# Patient Record
Sex: MALE | Race: WHITE | NOT HISPANIC OR LATINO | Employment: FULL TIME | URBAN - METROPOLITAN AREA
[De-identification: names, ages, dates, MRNs, and addresses within clinical notes are randomized per-mention and may not be internally consistent; named-entity substitution may affect disease eponyms.]

---

## 2017-03-11 ENCOUNTER — LAB VISIT (OUTPATIENT)
Dept: LAB | Facility: HOSPITAL | Age: 62
End: 2017-03-11
Attending: FAMILY MEDICINE
Payer: COMMERCIAL

## 2017-03-11 DIAGNOSIS — E66.9 OBESITY (BMI 30-39.9): ICD-10-CM

## 2017-03-11 DIAGNOSIS — E11.8 TYPE 2 DIABETES MELLITUS WITH COMPLICATION, WITHOUT LONG-TERM CURRENT USE OF INSULIN: ICD-10-CM

## 2017-03-11 LAB — 25(OH)D3+25(OH)D2 SERPL-MCNC: 29 NG/ML

## 2017-03-11 PROCEDURE — 83036 HEMOGLOBIN GLYCOSYLATED A1C: CPT

## 2017-03-11 PROCEDURE — 82306 VITAMIN D 25 HYDROXY: CPT

## 2017-03-11 PROCEDURE — 36415 COLL VENOUS BLD VENIPUNCTURE: CPT | Mod: PO

## 2017-03-13 LAB
ESTIMATED AVG GLUCOSE: 126 MG/DL
HBA1C MFR BLD HPLC: 6 %

## 2017-03-15 ENCOUNTER — DOCUMENTATION ONLY (OUTPATIENT)
Dept: FAMILY MEDICINE | Facility: CLINIC | Age: 62
End: 2017-03-15

## 2017-03-15 NOTE — PROGRESS NOTES
Pre-Visit Chart Review  For Appointment Scheduled on 3/15/17    Health Maintenance Due   Topic Date Due    Urine Microalbumin  06/06/1965    TETANUS VACCINE  06/06/1973    Pneumococcal PPSV23 (Medium Risk) (1) 06/06/1973    Zoster Vaccine  06/06/2015    Eye Exam  06/18/2016    Influenza Vaccine  08/01/2016

## 2017-03-16 ENCOUNTER — TELEPHONE (OUTPATIENT)
Dept: FAMILY MEDICINE | Facility: CLINIC | Age: 62
End: 2017-03-16

## 2017-03-16 NOTE — TELEPHONE ENCOUNTER
----- Message from Lenny Fletcher MD sent at 3/14/2017  9:24 AM CDT -----  Please call informed patient that recent lab work shows a decrease in patient's hemoglobin A1c from 6.6-6.0.  Patient also has vitamin D deficiency.  Please advise patient to take 1000 - 2000 units of vitamin D daily

## 2017-04-25 ENCOUNTER — DOCUMENTATION ONLY (OUTPATIENT)
Dept: FAMILY MEDICINE | Facility: CLINIC | Age: 62
End: 2017-04-25

## 2017-04-25 NOTE — PROGRESS NOTES
Pre-Visit Chart Review  For Appointment Scheduled on 4/25/17.    Health Maintenance Due   Topic Date Due    Urine Microalbumin  06/06/1965    TETANUS VACCINE  06/06/1973    Pneumococcal PPSV23 (Medium Risk) (1) 06/06/1973    Zoster Vaccine  06/06/2015    Eye Exam  06/18/2016    Influenza Vaccine  08/01/2016

## 2017-04-26 ENCOUNTER — TELEPHONE (OUTPATIENT)
Dept: DERMATOLOGY | Facility: CLINIC | Age: 62
End: 2017-04-26

## 2017-04-26 ENCOUNTER — OFFICE VISIT (OUTPATIENT)
Dept: FAMILY MEDICINE | Facility: CLINIC | Age: 62
End: 2017-04-26
Payer: COMMERCIAL

## 2017-04-26 VITALS
DIASTOLIC BLOOD PRESSURE: 66 MMHG | WEIGHT: 242.5 LBS | RESPIRATION RATE: 18 BRPM | HEART RATE: 61 BPM | BODY MASS INDEX: 36.75 KG/M2 | TEMPERATURE: 98 F | SYSTOLIC BLOOD PRESSURE: 108 MMHG | HEIGHT: 68 IN

## 2017-04-26 DIAGNOSIS — R21 RASH: Primary | ICD-10-CM

## 2017-04-26 DIAGNOSIS — E11.8 TYPE 2 DIABETES MELLITUS WITH COMPLICATION, WITHOUT LONG-TERM CURRENT USE OF INSULIN: ICD-10-CM

## 2017-04-26 DIAGNOSIS — E66.9 OBESITY (BMI 30-39.9): ICD-10-CM

## 2017-04-26 PROCEDURE — 99214 OFFICE O/P EST MOD 30 MIN: CPT | Mod: S$GLB,,, | Performed by: FAMILY MEDICINE

## 2017-04-26 PROCEDURE — 99999 PR PBB SHADOW E&M-EST. PATIENT-LVL IV: CPT | Mod: PBBFAC,,, | Performed by: FAMILY MEDICINE

## 2017-04-26 PROCEDURE — 3060F POS MICROALBUMINURIA REV: CPT | Mod: 8P,S$GLB,, | Performed by: FAMILY MEDICINE

## 2017-04-26 PROCEDURE — 1160F RVW MEDS BY RX/DR IN RCRD: CPT | Mod: S$GLB,,, | Performed by: FAMILY MEDICINE

## 2017-04-26 PROCEDURE — 3044F HG A1C LEVEL LT 7.0%: CPT | Mod: S$GLB,,, | Performed by: FAMILY MEDICINE

## 2017-04-26 RX ORDER — HYDROCORTISONE 25 MG/G
CREAM TOPICAL 2 TIMES DAILY
Qty: 20 G | Refills: 2 | Status: SHIPPED | OUTPATIENT
Start: 2017-04-26 | End: 2019-02-26

## 2017-04-26 RX ORDER — ZOLMITRIPTAN 5 MG/1
SPRAY, METERED NASAL
Refills: 2 | COMMUNITY
Start: 2017-02-20 | End: 2017-05-09 | Stop reason: SDUPTHER

## 2017-04-26 NOTE — TELEPHONE ENCOUNTER
----- Message from Estella Schroeder sent at 4/26/2017  2:27 PM CDT -----  Pt was seen today and has an appt for 6/15 but has a rash and would like to be seen sooner if possible  Please call him @ [phone]  Thanks !

## 2017-04-26 NOTE — MR AVS SNAPSHOT
[facility]  [address]  [place] [address]  Phone: [phone]  Fax: [phone]                  Clive Guzman   2017 2:20 PM   Office Visit    Description:  Male : 1955   Provider:  Lenny Fletcher MD   Department:  [facility]           Reason for Visit     Itching           Diagnoses this Visit        Comments    Rash    -  Primary     Type 2 diabetes mellitus with complication, without long-term current use of insulin                To Do List           Future Appointments        Provider Department Dept Phone    6/15/2017 2:15 PM Brittani Ruiz MD [place] [facility] [phone]    2017 11:20 AM Lenny Fletcher MD [facility] [phone]      Goals (5 Years of Data)     None      Follow-Up and Disposition     Return in about 3 months (around 2017).       These Medications        Disp Refills Start End    hydrocortisone 2.5 % cream 20 g 2 2017    Apply topically 2 (two) times daily. - Topical (Top)    Pharmacy: [id number]  [place] - [address] [facility] [address] [facility] Ph #: [phone]       blood sugar diagnostic Strp 200 strip 11 2017     1 strip by Misc.(Non-Drug; Combo Route) route once daily. - Misc.(Non-Drug; Combo Route)    Pharmacy: [id number]  [place] - [address] [facility] [address] [facility] Ph #: [phone]         [facility] On [facility]     [facility] On Call Nurse Care Line -  Assistance  Unless otherwise directed by your provider, please contact [facility], our nurse care line that is available for  assistance.     Registered nurses in the [facility] provide: appointment scheduling, clinical advisement, health education, and other advisory services.  Call: [phone] (toll free)               Medications           Message regarding Medications     Verify the changes and/or additions  to your medication regime listed below are the same as discussed with your clinician today.  If any of these changes or additions are incorrect, please notify your healthcare provider.        START taking these NEW medications        Refills    hydrocortisone 2.5 % cream 2    Sig: Apply topically 2 (two) times daily.    Class: Normal    Route: Topical (Top)    blood sugar diagnostic Strp 11    Si strip by Misc.(Non-Drug; Combo Route) route once daily.    Class: Normal    Route: Misc.(Non-Drug; Combo Route)           Verify that the below list of medications is an accurate representation of the medications you are currently taking.  If none reported, the list may be blank. If incorrect, please contact your healthcare provider. Carry this list with you in case of emergency.           Current Medications     aspirin (ASPIR-81) 81 MG EC tablet Take 1 tablet by mouth Daily. Every day    cholecalciferol, vitamin D3, 1,000 unit capsule Take 1 capsule by mouth Daily. Every day    DYMISTA 137-50 mcg/spray Spry     fenofibrate 160 MG Tab Take 1 tablet (160 mg total) by mouth once daily.    lancets 33 gauge Misc 1 lancet by Misc.(Non-Drug; Combo Route) route once daily.    magnesium oxide-Mg AA chelate 133 mg Tab Take 1 tablet by mouth.    metformin (GLUCOPHAGE) 500 MG tablet Take 1 tablet (500 mg total) by mouth 2 (two) times daily with meals.    nadolol (CORGARD) 20 MG tablet Take 1 tablet (20 mg total) by mouth once daily.    omeprazole (PRILOSEC) 40 MG capsule Take 1 capsule (40 mg total) by mouth once daily.    zolmitriptan (ZOMIG) 5 MG tablet Take 1 tablet (5 mg total) by mouth 2 (two) times daily as needed for Migraine.    ZOMIG 5 mg Spry USE 1 SPRAY IN NOS ONCE PRN    blood sugar diagnostic Strp One touch test strips    DAILY testing    blood sugar diagnostic Strp 1 strip by Misc.(Non-Drug; Combo Route) route once daily.    hydrocortisone 2.5 % cream Apply topically 2 (two) times daily.           Clinical Reference  "Information           Your Vitals Were     BP Pulse Temp Resp Height Weight    108/66 (BP Location: Right arm, Patient Position: Sitting, BP Method: Automatic) 61 98.3 °F (36.8 °C) (Oral) 18 5' 8" (1.727 m) 110 kg (242 lb 8.1 oz)    BMI                36.87 kg/m2          Blood Pressure          Most Recent Value    BP  108/66      Allergies as of 4/26/2017     Lipitor  [Atorvastatin]      Immunizations Administered on Date of Encounter - 4/26/2017     None      Orders Placed During Today's Visit      Normal Orders This Visit    Ambulatory referral to Dermatology     Ambulatory referral to Ophthalmology       Language Assistance Services     ATTENTION: Language assistance services are available, free of charge. Please call [phone].      ATENCIÓN: Si boonela nilda, tiene a bautista disposición servicios gratuitos de asistencia lingüística. Llame al [phone].     CHÚ Ý: N?u b?n nói Ti?ng Vi?t, có các d?ch v? h? tr? ngôn ng? mi?n phí dành cho b?n. G?i s? [phone].         [facility] complies with applicable Federal civil rights laws and does not discriminate on the basis of race, color, national origin, age, disability, or sex.        "

## 2017-04-27 NOTE — PROGRESS NOTES
"[facility]  Progress Note    Subjective:       Patient ID: Clive Guzman is a 61 y.o. male.    Chief Complaint: Itching (feet and arms)    HPI61 y.o.male is here today complaining of rash.  Patient has been getting a red itchy rash periodically throughout his body.  Patient has been taking Benadryl which has been moderately effective in controlling his symptoms.  Patient does not have a history of recurring rashes in the past.  Patient has not been evaluated by dermatology.  Patient denies any associated fever, chills, or new irritants.  No further complaints at today's visit.  Review of Systems   Constitutional: Negative for chills, fatigue and fever.   HENT: Negative for congestion, ear pain, postnasal drip, sinus pressure, sneezing and sore throat.    Eyes: Negative for pain.   Respiratory: Negative for cough, shortness of breath and wheezing.    Cardiovascular: Negative for chest pain, palpitations and leg swelling.   Gastrointestinal: Negative for abdominal distention, abdominal pain, constipation, diarrhea, nausea and vomiting.   Genitourinary: Negative for difficulty urinating.   Musculoskeletal: Negative for back pain and myalgias.   Skin: Positive for rash.   Neurological: Negative for dizziness, seizures, syncope, weakness and numbness.   Psychiatric/Behavioral: Negative for sleep disturbance. The patient is not nervous/anxious.        Objective:      Vitals:    04/26/17 1409   BP: 108/66   BP Location: Right arm   Patient Position: Sitting   BP Method: Automatic   Pulse: 61   Resp: 18   Temp: 98.3 °F (36.8 °C)   TempSrc: Oral   Weight: 110 kg (242 lb 8.1 oz)   Height: 5' 8" (1.727 m)     Body mass index is 36.87 kg/(m^2).  Physical Exam   Constitutional: He is oriented to person, place, and time. He appears well-developed and well-nourished.   HENT:   Head: Normocephalic and atraumatic.   Eyes: Conjunctivae and EOM are normal. Pupils are equal, round, and reactive to light.   Neck: Normal " range of motion. Neck supple. No JVD present.   Cardiovascular: Normal rate, regular rhythm, normal heart sounds and intact distal pulses.  Exam reveals no gallop and no friction rub.    No murmur heard.  Pulmonary/Chest: Effort normal and breath sounds normal. No respiratory distress. He has no wheezes.   Abdominal: Soft. Bowel sounds are normal. There is no tenderness.   Musculoskeletal: Normal range of motion.   Neurological: He is alert and oriented to person, place, and time. No cranial nerve deficit.   Skin: Skin is warm and dry.   Circular areas of erythema with center on left arm   Psychiatric: He has a normal mood and affect. His behavior is normal. Judgment and thought content normal.   Nursing note and vitals reviewed.      Assessment:       1. Rash    2. Type 2 diabetes mellitus with complication, without long-term current use of insulin    3. Obesity (BMI 30-39.9)        Plan:       Rash  -     Ambulatory referral to Dermatology  -     hydrocortisone 2.5 % cream; Apply topically 2 (two) times daily.  Dispense: 20 g; Refill: 2    Type 2 diabetes mellitus with complication, without long-term current use of insulin  -     blood sugar diagnostic Strp; 1 strip by Misc.(Non-Drug; Combo Route) route once daily.  Dispense: 200 strip; Refill: 11  -     Ambulatory referral to Ophthalmology    Obesity (BMI 30-39.9)        - Patient educated on diet and exercise     Patient readiness: acceptance and barriers:none    During the course of the visit the patient was educated and counseled about the following:     Diabetes:  Discussed general issues about diabetes pathophysiology and management.  Suggested low cholesterol diet.  Encouraged aerobic exercise.  Obesity:   General weight loss/lifestyle modification strategies discussed (elicit support from others; identify saboteurs; non-food rewards, etc).    Goals: Diabetes: Maintain Hemoglobin A1C below 7 and Obesity: Reduce calorie intake and BMI    Did patient meet  goals/outcomes: Yes    The following self management tools provided: blood glucose log  excercise log    Patient Instructions (the written plan) was given to the patient/family.     Time spent with patient: 45 minutes        Return in about 3 months (around 7/26/2017).  Lenny Fletcher MD  [facility]  4/27/2017 7:47 AM

## 2017-05-09 ENCOUNTER — INITIAL CONSULT (OUTPATIENT)
Dept: DERMATOLOGY | Facility: CLINIC | Age: 62
End: 2017-05-09
Payer: COMMERCIAL

## 2017-05-09 VITALS — WEIGHT: 242 LBS | BODY MASS INDEX: 36.68 KG/M2 | HEIGHT: 68 IN

## 2017-05-09 DIAGNOSIS — L98.9 DISEASE OF SKIN AND SUBCUTANEOUS TISSUE: Primary | ICD-10-CM

## 2017-05-09 PROCEDURE — 87220 TISSUE EXAM FOR FUNGI: CPT | Mod: S$GLB,,, | Performed by: DERMATOLOGY

## 2017-05-09 PROCEDURE — 1160F RVW MEDS BY RX/DR IN RCRD: CPT | Mod: S$GLB,,, | Performed by: DERMATOLOGY

## 2017-05-09 PROCEDURE — 99999 PR PBB SHADOW E&M-EST. PATIENT-LVL II: CPT | Mod: PBBFAC,,, | Performed by: DERMATOLOGY

## 2017-05-09 PROCEDURE — 99202 OFFICE O/P NEW SF 15 MIN: CPT | Mod: S$GLB,,, | Performed by: DERMATOLOGY

## 2017-05-09 RX ORDER — FLUOCINONIDE 0.5 MG/G
OINTMENT TOPICAL
Qty: 60 G | Refills: 1 | Status: SHIPPED | OUTPATIENT
Start: 2017-05-09 | End: 2019-01-25

## 2017-05-09 NOTE — PATIENT INSTRUCTIONS
XEROSIS (DRY SKIN)    Xerosis is the term for dry skin.  We all have a natural oil coating over our skin produced by the skin oil glands.  If this oil is removed, the skin becomes dry which can lead to cracking, which can lead to inflammation. Xerosis is usually a long-term problem that recurs often, especially in the winter.    1. Cause     Long hot baths or showers can remove our natural oil and lead to xerosis.  One should never take more than one bath or shower a day and for no longer than ten minutes.   Use of harsh soaps such as Zest, Dial, and Ivory can worsen and cause xerosis.   Cold winter weather worsens xerosis because the amount of moisture contained in cold air is much less than the amount of moisture in warm air.    2. Treatment     Treatment is intended to restore the natural oil to your skin.  Keep the skin lubricated.     Do not take more than one bath or shower a day.  Use lukewarm water, not hot.  Hot water dries out the skin.     Use a gentle moisturizing soap such as Cetaphil soap, cerave gentle cleanser, Oil of Olay, Dove sensitive bar, Basis, Ivory moisture care, Restoraderm cleanser.     When toweling dry, dont rub.  Blot the skin so there is still some water left on the skin.  Immediately after toweling, you should apply a moisturizing cream from neck to toes such as Cerave cream, Cetaphil cream, Restoraderm or Eucerin Original Formula cream.  Alpha hydroxyacid lotions, i.e., AmLactin or Cerave SA, also work very well for preventing dry skin, but may burn when used on inflamed or reddened skin.     If you like to swim during the winter months, you should not use soap when getting out of the pool.  When you have finished swimming, rinse off the chlorine with cool to warm water.  If this will be the only shower of the day, then you may use Cetaphil or another mild soap to cleanse your skin.  After the shower, apply a moisturizing cream to all of the skin as above.    3. Additional  recommendation:      Use unscented hypoallergenic detergent for your clothes, avoid softener or dryer sheets unless they are unscented and hypoallergenic (all free and clear; downy free and gentle).    [facility]; [address] [place] [address] Tel: [phone] Fax: [phone]

## 2017-05-09 NOTE — LETTER
May 9, 2017      Lenny Fletcher MD  [address]  [place] [address]           [place] [facility]  [address]  [place] [address]  Phone: [phone]          Patient: Clive Guzman   MR Number: [id number]   YOB: 1955   Date of Visit: 5/9/2017       Dear Dr. Lenny Fletcher:    Thank you for referring Clive Guzman to me for evaluation. Attached you will find relevant portions of my assessment and plan of care.    If you have questions, please do not hesitate to call me. I look forward to following Clive Guzman along with you.    Sincerely,    Brittani Ruiz MD    Enclosure  CC:  No Recipients    If you would like to receive this communication electronically, please contact [email or web address] or [phone] to request more information on [facility] [employer] access.    For providers and/or their staff who would like to refer a patient to [facility], please contact us through our one-stop-shop provider referral line, [facility], at [phone].    If you feel you have received this communication in error or would no longer like to receive these types of communications, please e-mail [email or web address]

## 2017-05-09 NOTE — PROGRESS NOTES
"  Subjective:       Patient ID:  Clive Guzman is a 61 y.o. male who presents for   Chief Complaint   Patient presents with    Rash     hands, feet, lower legs, lower arms     HPI Comments: Initial visit  Reports pimple like lesions,evolve into swollen itchy nodules, entire area becomes itchy, discharges clear fluid  New for past 3 months  No similar issue in the past  Come in crops of 2-3 , mostly hands and feet  No other family member with similar issue  Works in [employer], denies contact with chemicals  However at the time of onset "talstart" insecticide was applied to lawn          Current Outpatient Prescriptions:     aspirin (ASPIR-81) 81 MG EC tablet, Take 1 tablet by mouth Daily. Every day, Disp: , Rfl:     blood sugar diagnostic Strp, One touch test strips    DAILY testing, Disp: 30 strip, Rfl: 11    blood sugar diagnostic Strp, 1 strip by Misc.(Non-Drug; Combo Route) route once daily., Disp: 200 strip, Rfl: 11    cholecalciferol, vitamin D3, 1,000 unit capsule, Take 1 capsule by mouth Daily. Every day, Disp: , Rfl:     DYMISTA 137-50 mcg/spray Spry, , Disp: , Rfl: 5    fenofibrate 160 MG Tab, Take 1 tablet (160 mg total) by mouth once daily., Disp: 90 tablet, Rfl: 3    lancets 33 gauge Misc, 1 lancet by Misc.(Non-Drug; Combo Route) route once daily., Disp: 100 each, Rfl: 4    magnesium oxide-Mg AA chelate 133 mg Tab, Take 1 tablet by mouth., Disp: , Rfl:     metformin (GLUCOPHAGE) 500 MG tablet, Take 1 tablet (500 mg total) by mouth 2 (two) times daily with meals., Disp: 180 tablet, Rfl: 3    nadolol (CORGARD) 20 MG tablet, Take 1 tablet (20 mg total) by mouth once daily., Disp: 60 tablet, Rfl: 5    omeprazole (PRILOSEC) 40 MG capsule, Take 1 capsule (40 mg total) by mouth once daily., Disp: 90 capsule, Rfl: 3    zolmitriptan (ZOMIG) 5 MG tablet, Take 1 tablet (5 mg total) by mouth 2 (two) times daily as needed for Migraine., Disp: 9 tablet, Rfl: 5    " hydrocortisone 2.5 % cream, Apply topically 2 (two) times daily., Disp: 20 g, Rfl: 2      Rash  - Initial  Affected locations: left lower leg, right lower leg, right arm, left arm, left hand and right hand  Duration: 3 months  Signs / symptoms: itching (raised, come and goes, somw have fluid like discomfort )  Severity: mild  Timing: constant  Aggravated by: scratching  Treatments tried: cortisone cream OTC.  Improvement on treatment: no relief        Review of Systems   Constitutional: Negative for fever, chills, weight loss, weight gain, fatigue, night sweats and malaise.   Skin: Positive for itching, rash and activity-related sunscreen use.   Hematologic/Lymphatic: Bruises/bleeds easily.        Objective:    Physical Exam   Skin:   Areas Examined (abnormalities noted in diagram):   RUE Inspected  LUE Inspection Performed  RLE Inspected  LLE Inspection Performed  Nails and Digits Inspection Performed              Diagram Legend     Erythematous scaling macule/papule c/w actinic keratosis       Vascular papule c/w angioma      Pigmented verrucoid papule/plaque c/w seborrheic keratosis      Yellow umbilicated papule c/w sebaceous hyperplasia      Irregularly shaped tan macule c/w lentigo     1-2 mm smooth white papules consistent with Milia      Movable subcutaneous cyst with punctum c/w epidermal inclusion cyst      Subcutaneous movable cyst c/w pilar cyst      Firm pink to brown papule c/w dermatofibroma      Pedunculated fleshy papule(s) c/w skin tag(s)      Evenly pigmented macule c/w junctional nevus     Mildly variegated pigmented, slightly irregular-bordered macule c/w mildly atypical nevus      Flesh colored to evenly pigmented papule c/w intradermal nevus       Pink pearly papule/plaque c/w basal cell carcinoma      Erythematous hyperkeratotic cursted plaque c/w SCC      Surgical scar with no sign of skin cancer recurrence      Open and closed comedones      Inflammatory papules and pustules      Verrucoid  papule consistent consistent with wart     Erythematous eczematous patches and plaques     Dystrophic onycholytic nail with subungual debris c/w onychomycosis     Umbilicated papule    Erythematous-base heme-crusted tan verrucoid plaque consistent with inflamed seborrheic keratosis     Erythematous Silvery Scaling Plaque c/w Psoriasis     See annotation      Assessment / Plan:        Disease of skin and subcutaneous tissue  ABR vs other  Oil prep negative for scabies  Mostly secondary lesions today, excoriated, biopsy would unlikely be helpful  Noticed new crops after yard work  Stop yardwork  Return to clinic if new lesions appear, will biopsy primary lesion  -     fluocinonide (LIDEX) 0.05 % ointment; Spot treat itchy bumps ankles and hands BID PRN lfare  Dispense: 60 g; Refill: 1           Return if symptoms worsen or fail to improve.

## 2017-05-18 ENCOUNTER — DOCUMENTATION ONLY (OUTPATIENT)
Dept: FAMILY MEDICINE | Facility: CLINIC | Age: 62
End: 2017-05-18

## 2017-05-18 ENCOUNTER — OFFICE VISIT (OUTPATIENT)
Dept: FAMILY MEDICINE | Facility: CLINIC | Age: 62
End: 2017-05-18
Payer: COMMERCIAL

## 2017-05-18 VITALS
HEIGHT: 68 IN | TEMPERATURE: 99 F | HEART RATE: 59 BPM | WEIGHT: 244.25 LBS | SYSTOLIC BLOOD PRESSURE: 104 MMHG | DIASTOLIC BLOOD PRESSURE: 61 MMHG | BODY MASS INDEX: 37.02 KG/M2

## 2017-05-18 DIAGNOSIS — H66.002 ACUTE SUPPURATIVE OTITIS MEDIA OF LEFT EAR WITHOUT SPONTANEOUS RUPTURE OF TYMPANIC MEMBRANE, RECURRENCE NOT SPECIFIED: Primary | ICD-10-CM

## 2017-05-18 PROCEDURE — 1160F RVW MEDS BY RX/DR IN RCRD: CPT | Mod: S$GLB,,, | Performed by: FAMILY MEDICINE

## 2017-05-18 PROCEDURE — 99213 OFFICE O/P EST LOW 20 MIN: CPT | Mod: S$GLB,,, | Performed by: FAMILY MEDICINE

## 2017-05-18 PROCEDURE — 99999 PR PBB SHADOW E&M-EST. PATIENT-LVL III: CPT | Mod: PBBFAC,,, | Performed by: FAMILY MEDICINE

## 2017-05-18 RX ORDER — CIPROFLOXACIN AND DEXAMETHASONE 3; 1 MG/ML; MG/ML
4 SUSPENSION/ DROPS AURICULAR (OTIC) 2 TIMES DAILY
Qty: 7.5 ML | Refills: 0 | Status: SHIPPED | OUTPATIENT
Start: 2017-05-18 | End: 2017-10-16 | Stop reason: ALTCHOICE

## 2017-05-18 RX ORDER — AMOXICILLIN AND CLAVULANATE POTASSIUM 875; 125 MG/1; MG/1
1 TABLET, FILM COATED ORAL 2 TIMES DAILY
Qty: 20 TABLET | Refills: 0 | Status: SHIPPED | OUTPATIENT
Start: 2017-05-18 | End: 2017-05-28

## 2017-05-18 NOTE — PROGRESS NOTES
Pre-Visit Chart Review  For Appointment Scheduled on 05/18/2017    Health Maintenance Due   Topic Date Due    Urine Microalbumin  06/06/1965    TETANUS VACCINE  06/06/1973    Pneumococcal PPSV23 (Medium Risk) (1) 06/06/1973    Zoster Vaccine  06/06/2015    Eye Exam  06/18/2016

## 2017-05-18 NOTE — PATIENT INSTRUCTIONS
Diabetes (General Information)  Diabetes is a long-term health problem. It means your body does not make enough insulin. Or it may mean that your body cannot use the insulin it makes. Insulin is a hormone in your body. It lets blood sugar (glucose) reach the cells in your body. All of your cells need glucose for fuel.  When you have diabetes, the glucose in your blood builds up because it cannot get into the cells. This buildup is called high blood sugar (hyperglycemia).  Your blood sugar level depends on several things. It depends on what kind of food you eat and how much of it you eat. It also depends on how much exercise you get, and how much insulin you have in your body. Eating too much of the wrong kinds of food or not taking diabetes medicine on time can cause high blood sugar. Infections can cause high blood sugar even if you are taking medicines correctly.  These things can also cause low blood sugar:  · Missing meals  · Not eating enough food  · Taking too much diabetes medicine  Diabetes can cause serious problems over time if you do not get treated. These problems include heart disease, stroke, kidney failure, and blindness. They also include nerve pain or loss of feeling in your legs and feet, and gangrene of the feet. By keeping your blood sugar under control you can prevent or delay these problems.  Normal blood sugar levels are 80 to 100 before a meal and less than 180 in the 1 to 2 hours after a meal.  Home care  Follow these guidelines when caring for yourself at home:  · Follow the diet your healthcare provider gives you. Take insulin or other diabetes medicine exactly as told to.  · Watch your blood sugar as you are told to. Keep a log of your results. This will help your provider change your medicines to keep your blood sugar under control.  · Try to reach your ideal weight. You may be able to cut back on or not have to take diabetes medicine if you eat the right foods and get exercise.  · Do  not smoke. Smoking worsens the effects of diabetes on your circulation. You are much more likely to have a heart attack if you have diabetes and you smoke.  · Take good care of your feet. If you have lost feeling in your feet, you may not see an injury or infection. Check your feet and between your toes at least once a week.  · Wear a medical alert bracelet or necklace, or carry a card in your wallet that says you have diabetes. This will help healthcare providers give you the right care if you get very ill and cannot tell them that you have diabetes.  Sick day plan  If you get a cold, the flu, or a bacterial or viral infection, take these steps:  · Look at your diabetes sick plan and call your healthcare provider as you were told to. You may need to call your provider right away if:  ¨ Your blood sugar is above 240 while taking your diabetes medicine  ¨ Your urine ketone levels are above normal or high  ¨ You have been vomiting more than 6 hours  ¨ You have trouble breathing or your breath ha s a fruity smell  ¨ You have a high fever  ¨ You have a fever for several days and you are not getting better  ¨ You get light-headed and are sleepier than usual  · Keep taking your diabetes pills (oral medicine) even if you have been vomiting and are feeling sick. Call your provider right away because you may need insulin to lower your blood sugar until you recover from your illness.  · Keep taking your insulin even if you have been vomiting and are feeling sick. Call your provider right away to ask if you need to change your insulin dose. This will depend on your blood sugar results.  · Check your blood sugar every 2 to 4 hours, or at least 4 times a day.  · Check your ketones often. If you are vomiting and having diarrhea, watch them more often.  · Do not skip meals. Try to eat small meals on a regular schedule. Do this even if you do not feel like eating.  · Drink water or other liquids that do not have caffeine or  calories. This will keep you from getting dehydrated. If you are nauseated or vomiting, takes small sips every 5 minutes. To prevent dehydration try to drink a cup (8 ounces) of fluids every hour while you are awake.  General care  Always bring a source of fast-acting sugar with you in case you have symptoms of low blood sugar (below 70). At the first sign of low blood sugar, eat or drink 15 to 20 grams of fast-acting sugar to raise your blood sugar. Examples are:  · 3 to 4 glucose tablets. You can buy these at most [employer].  · 4 ounces (1/2 cup) of regular (not diet) soft drinks  · 4 ounces (1/2 cup) of any fruit juice  · 8 ounces (1 cup) of milk  · 5 to 6 pieces of hard candy  · 1 tablespoon of honey  Check your blood sugar 15 minutes after treating yourself. If it is still below 70, take 15 to 20 more grams of fast-acting sugar. Test again in 15 minutes. If it returns to normal (70 or above), eat a snack or meal to keep your blood sugar in a safe range. If it stays low, call your doctor or go to an emergency room.  Follow-up care  Follow-up with your healthcare provider, or as advised. For more information about diabetes, visit the American Diabetes Association website at www.diabetes.org or call 580-733-0712.  When to seek medical advice  Call your healthcare provider right away if you have any of these symptoms of high blood sugar:  · Frequent urination  · Dizziness  · Drowsiness  · Thirst  · Headache  · Nausea or vomiting  · Abdominal pain  · Eyesight changes  · Fast breathing  · Confusion or loss of consciousness  Also call your provider right away if you have any of these signs of low blood sugar:  · Fatigue  · Headache  · Shakes  · Excess sweating  · Hunger  · Feeling anxious or restless  · Eyesight changes  · Drowsiness  · Weakness  · Confusion or loss of consciousness  Call 911  Call for emergency help right away if any of these occur:  · Chest pain or shortness of breath  · Dizziness or  fainting  · Weakness of an arm or leg or one side of the face  · Trouble speaking or seeing   Date Last Reviewed: 6/1/2016 © 2000-2016 [employer]. [address] [place] [address], [place] [address]. All rights reserved. This information is not intended as a substitute for professional medical care. Always follow your healthcare professional's instructions.        Weight Management: Getting Started  Healthy bodies come in all shapes and sizes. Not all bodies are made to be thin. For some people, a healthy weight is higher than the average weight listed on weight charts. Your healthcare provider can help you decide on a healthy weight for you.    Reasons to lose weight  Losing weight can help with some health problems, such as high blood pressure, heart disease, diabetes, sleep apnea, and arthritis. You may also feel more energy.  Set your long-term goal  Your goal doesn't even have to be a specific weight. You may decide on a fitness goal (such as being able to walk 10 miles a week), or a health goal (such as lowering your blood pressure). Choose a goal that is measurable and reasonable, so you know when you've reached it. A goal of reaching a BMI of less than 25 is not always reasonable (or possible).   Make an action plan  Habits dont change overnight. Setting your goals too high can leave you feeling discouraged if you cant reach them. Be realistic. Choose one or two small changes you can make now. Set an action plan for how you are going to make these changes. When you can stick to this plan, keep making a few more small changes. Taking small steps will help you stay on the path to success.  Track your progress  Write down your goals. Then, keep a daily record of your progress. Write down what you eat and how active you are. This record lets you look back on how much youve done. It may also help when youre feeling frustrated. Reward yourself for success. Even if you dont reach every goal, give  yourself credit for what you do get done.  Get support  Encouragement from others can help make losing weight easier. Ask your family members and friends for support. They may even want to join you. Also look to your healthcare provider, registered dietitian, and  for help. Your local hospital can give you more information about nutrition, exercise, and weight loss.  Date Last Reviewed: 1/31/2016  © 4694-5449 [employer]. [address] [place] [address], [place] [address]. All rights reserved. This information is not intended as a substitute for professional medical care. Always follow your healthcare professional's instructions.        Walking for Fitness  Fitness walking has something for everyone, even people who are already fit. Walking is one of the safest ways to condition your body aerobically. It can boost energy, help you lose weight, and reduce stress.    Physical benefits  · Walking strengthens your heart and lungs, and tones your muscles.  · When walking, your feet land with less impact than in other sports. This reduces chances of muscle, bone, and joint injury.  · Regular walking improves your cholesterol levels and lowers your risk of heart disease. And it helps you control your blood sugar if you have diabetes.  · Walking is a weight-bearing activity, which helps maintain bone density. This can help prevent osteoporosis.  Personal rewards  · Taking walks can help you relax and manage stress. And fitness walking may make you feel better about yourself.  · Walking can help you sleep better at night and make you less likely to be depressed.  · Regular walking may help maintain your memory as you get older.  · Walking is a great way to spend extra time with friends and family members. Be sure to invite your dog along!  Q&A about fitness walking  Q: Will walking keep me fit?  A: Yes. Regular walking at the right pace gives you all the benefits of other aerobic activities, such as  jogging and swimming.  Q: Will walking help me lose weight and keep it off?  A: Yes. Per mile, walking can burn as many calories as jogging. Your health care provider can help work walking into your weight-loss plan.  Q: Is walking safe for my health?  A: Yes. Walking is safe if you have high blood pressure, diabetes, heart disease, or other conditions. Talk to your health care provider before you start.  Date Last Reviewed: 5/9/2015 © 2000-2016 [employer]. [address] [place] [address], [place] [address]. All rights reserved. This information is not intended as a substitute for professional medical care. Always follow your healthcare professional's instructions.

## 2017-05-18 NOTE — PROGRESS NOTES
"[facility]  Progress Note    Subjective:       Patient ID: Clive Guzman is a 61 y.o. male.    Chief Complaint: Otalgia (left x 4 days)    HPI61 y.o.male is here today complaining of left ear pain.  Patient has had ear pain for the past 1 week.  Patient has a history of recurrent ear infections as a child.  Patient has had tubes placed both ears.  Patient has been taken over-the-counter medications which have not been effective.  Patient denies fever, chills, or sick contacts.  Review of Systems   Constitutional: Negative for chills, fatigue and fever.   HENT: Positive for ear discharge and ear pain. Negative for congestion, postnasal drip, sinus pressure, sneezing and sore throat.    Eyes: Negative for pain.   Respiratory: Negative for cough, shortness of breath and wheezing.    Cardiovascular: Negative for chest pain, palpitations and leg swelling.   Gastrointestinal: Negative for abdominal distention, abdominal pain, constipation, diarrhea, nausea and vomiting.   Genitourinary: Negative for difficulty urinating.   Musculoskeletal: Negative for back pain and myalgias.   Skin: Negative for rash.   Neurological: Negative for dizziness, seizures, syncope, weakness and numbness.   Psychiatric/Behavioral: Negative for sleep disturbance. The patient is not nervous/anxious.        Objective:      Vitals:    05/18/17 0738   BP: 104/61   BP Location: Right arm   Patient Position: Sitting   BP Method: Automatic   Pulse: (!) 59   Temp: 98.5 °F (36.9 °C)   TempSrc: Oral   Weight: 110.8 kg (244 lb 4.3 oz)   Height: 5' 8" (1.727 m)     Body mass index is 37.14 kg/(m^2).  Physical Exam   Constitutional: He is oriented to person, place, and time. He appears well-developed and well-nourished. No distress.   HENT:   Head: Normocephalic and atraumatic.   Left Ear: There is drainage and tenderness. Tympanic membrane is scarred and erythematous. Tympanic membrane mobility is abnormal. A middle ear effusion is present. "   Cardiovascular: Normal rate, regular rhythm, normal heart sounds and intact distal pulses.  Exam reveals no gallop and no friction rub.    No murmur heard.  Pulmonary/Chest: Effort normal and breath sounds normal. No respiratory distress. He has no rales.   Abdominal: Soft. He exhibits no distension and no mass. There is no rebound and no guarding. No hernia.   Musculoskeletal: Normal range of motion.   Neurological: He is alert and oriented to person, place, and time.   Skin: Skin is warm.   Psychiatric: He has a normal mood and affect. His behavior is normal. Judgment and thought content normal.   Vitals reviewed.      Assessment:       1. Acute suppurative otitis media of left ear without spontaneous rupture of tympanic membrane, recurrence not specified        Plan:       Acute suppurative otitis media of left ear without spontaneous rupture of tympanic membrane, recurrence not specified  -     amoxicillin-clavulanate 875-125mg (AUGMENTIN) 875-125 mg per tablet; Take 1 tablet by mouth 2 (two) times daily.  Dispense: 20 tablet; Refill: 0  -     ciprofloxacin-dexamethasone 0.3-0.1% (CIPRODEX) 0.3-0.1 % DrpS; Place 4 drops into the left ear 2 (two) times daily.  Dispense: 7.5 mL; Refill: 0      Return if symptoms worsen or fail to improve.  Lenny Fletcher MD  [facility]  5/18/2017 7:58 AM

## 2017-05-18 NOTE — MR AVS SNAPSHOT
[facility]  [address]  [place] [address]  Phone: [phone]  Fax: [phone]                  Clive Guzman   2017 7:20 AM   Office Visit    Description:  Male : 1955   Provider:  Lenny Fletcher MD   Department:  [facility]           Reason for Visit     Otalgia           Diagnoses this Visit        Comments    Acute suppurative otitis media of left ear without spontaneous rupture of tympanic membrane, recurrence not specified    -  Primary            To Do List           Future Appointments        Provider Department Dept Phone    2017 11:20 AM Lenny Fletcher MD [facility] [phone]      Goals (5 Years of Data)     None       These Medications        Disp Refills Start End    amoxicillin-clavulanate 875-125mg (AUGMENTIN) 875-125 mg per tablet 20 tablet 0 2017    Take 1 tablet by mouth 2 (two) times daily. - Oral    Pharmacy: [facility] [id number]  [place]  [address] [facility] Ph #: [phone]       ciprofloxacin-dexamethasone 0.3-0.1% (CIPRODEX) 0.3-0.1 % DrpS 7.5 mL 0 2017     Place 4 drops into the left ear 2 (two) times daily. - Left Ear    Pharmacy: [facility] [id number] - [place] - [address] AT [facility] Ph #: [phone]         [facility]     [facility] -  Assistance  Unless otherwise directed by your provider, please contact [facility], our nurse care line that is available for  assistance.     Registered nurses in the [facility] provide: appointment scheduling, clinical advisement, health education, and other advisory services.  Call: [phone] (toll free)               Medications           Message regarding Medications     Verify the changes and/or additions to your medication regime listed below are the same as discussed with your clinician  today.  If any of these changes or additions are incorrect, please notify your healthcare provider.        START taking these NEW medications        Refills    amoxicillin-clavulanate 875-125mg (AUGMENTIN) 875-125 mg per tablet 0    Sig: Take 1 tablet by mouth 2 (two) times daily.    Class: Normal    Route: Oral    ciprofloxacin-dexamethasone 0.3-0.1% (CIPRODEX) 0.3-0.1 % DrpS 0    Sig: Place 4 drops into the left ear 2 (two) times daily.    Class: Print    Route: Left Ear           Verify that the below list of medications is an accurate representation of the medications you are currently taking.  If none reported, the list may be blank. If incorrect, please contact your healthcare provider. Carry this list with you in case of emergency.           Current Medications     aspirin (ASPIR-81) 81 MG EC tablet Take 1 tablet by mouth Daily. Every day    blood sugar diagnostic Strp One touch test strips    DAILY testing    blood sugar diagnostic Strp 1 strip by Misc.(Non-Drug; Combo Route) route once daily.    cholecalciferol, vitamin D3, 1,000 unit capsule Take 1 capsule by mouth Daily. Every day    DYMISTA 137-50 mcg/spray Spry     fenofibrate 160 MG Tab Take 1 tablet (160 mg total) by mouth once daily.    fluocinonide (LIDEX) 0.05 % ointment Spot treat itchy bumps ankles and hands BID PRN lfare    lancets 33 gauge Misc 1 lancet by Misc.(Non-Drug; Combo Route) route once daily.    magnesium oxide-Mg AA chelate 133 mg Tab Take 1 tablet by mouth.    metformin (GLUCOPHAGE) 500 MG tablet Take 1 tablet (500 mg total) by mouth 2 (two) times daily with meals.    nadolol (CORGARD) 20 MG tablet Take 1 tablet (20 mg total) by mouth once daily.    omeprazole (PRILOSEC) 40 MG capsule Take 1 capsule (40 mg total) by mouth once daily.    zolmitriptan (ZOMIG) 5 MG tablet Take 1 tablet (5 mg total) by mouth 2 (two) times daily as needed for Migraine.    amoxicillin-clavulanate 875-125mg (AUGMENTIN) 875-125 mg per tablet Take 1 tablet  "by mouth 2 (two) times daily.    ciprofloxacin-dexamethasone 0.3-0.1% (CIPRODEX) 0.3-0.1 % DrpS Place 4 drops into the left ear 2 (two) times daily.    hydrocortisone 2.5 % cream Apply topically 2 (two) times daily.           Clinical Reference Information           Your Vitals Were     BP Pulse Temp Height Weight BMI    104/61 (BP Location: Right arm, Patient Position: Sitting, BP Method: Automatic) 59 98.5 °F (36.9 °C) (Oral) 5' 8" (1.727 m) 110.8 kg (244 lb 4.3 oz) 37.14 kg/m2      Blood Pressure          Most Recent Value    BP  104/61      Allergies as of 5/18/2017     Lipitor  [Atorvastatin]      Immunizations Administered on Date of Encounter - 5/18/2017     None      Instructions      Diabetes (General Information)  Diabetes is a long-term health problem. It means your body does not make enough insulin. Or it may mean that your body cannot use the insulin it makes. Insulin is a hormone in your body. It lets blood sugar (glucose) reach the cells in your body. All of your cells need glucose for fuel.  When you have diabetes, the glucose in your blood builds up because it cannot get into the cells. This buildup is called high blood sugar (hyperglycemia).  Your blood sugar level depends on several things. It depends on what kind of food you eat and how much of it you eat. It also depends on how much exercise you get, and how much insulin you have in your body. Eating too much of the wrong kinds of food or not taking diabetes medicine on time can cause high blood sugar. Infections can cause high blood sugar even if you are taking medicines correctly.  These things can also cause low blood sugar:  · Missing meals  · Not eating enough food  · Taking too much diabetes medicine  Diabetes can cause serious problems over time if you do not get treated. These problems include heart disease, stroke, kidney failure, and blindness. They also include nerve pain or loss of feeling in your legs and feet, and gangrene of the " feet. By keeping your blood sugar under control you can prevent or delay these problems.  Normal blood sugar levels are 80 to 100 before a meal and less than 180 in the 1 to 2 hours after a meal.  Home care  Follow these guidelines when caring for yourself at home:  · Follow the diet your healthcare provider gives you. Take insulin or other diabetes medicine exactly as told to.  · Watch your blood sugar as you are told to. Keep a log of your results. This will help your provider change your medicines to keep your blood sugar under control.  · Try to reach your ideal weight. You may be able to cut back on or not have to take diabetes medicine if you eat the right foods and get exercise.  · Do not smoke. Smoking worsens the effects of diabetes on your circulation. You are much more likely to have a heart attack if you have diabetes and you smoke.  · Take good care of your feet. If you have lost feeling in your feet, you may not see an injury or infection. Check your feet and between your toes at least once a week.  · Wear a medical alert bracelet or necklace, or carry a card in your wallet that says you have diabetes. This will help healthcare providers give you the right care if you get very ill and cannot tell them that you have diabetes.  Sick day plan  If you get a cold, the flu, or a bacterial or viral infection, take these steps:  · Look at your diabetes sick plan and call your healthcare provider as you were told to. You may need to call your provider right away if:  ¨ Your blood sugar is above 240 while taking your diabetes medicine  ¨ Your urine ketone levels are above normal or high  ¨ You have been vomiting more than 6 hours  ¨ You have trouble breathing or your breath ha s a fruity smell  ¨ You have a high fever  ¨ You have a fever for several days and you are not getting better  ¨ You get light-headed and are sleepier than usual  · Keep taking your diabetes pills (oral medicine) even if you have been  vomiting and are feeling sick. Call your provider right away because you may need insulin to lower your blood sugar until you recover from your illness.  · Keep taking your insulin even if you have been vomiting and are feeling sick. Call your provider right away to ask if you need to change your insulin dose. This will depend on your blood sugar results.  · Check your blood sugar every 2 to 4 hours, or at least 4 times a day.  · Check your ketones often. If you are vomiting and having diarrhea, watch them more often.  · Do not skip meals. Try to eat small meals on a regular schedule. Do this even if you do not feel like eating.  · Drink water or other liquids that do not have caffeine or calories. This will keep you from getting dehydrated. If you are nauseated or vomiting, takes small sips every 5 minutes. To prevent dehydration try to drink a cup (8 ounces) of fluids every hour while you are awake.  General care  Always bring a source of fast-acting sugar with you in case you have symptoms of low blood sugar (below 70). At the first sign of low blood sugar, eat or drink 15 to 20 grams of fast-acting sugar to raise your blood sugar. Examples are:  · 3 to 4 glucose tablets. You can buy these at most drugstores.  · 4 ounces (1/2 cup) of regular (not diet) soft drinks  · 4 ounces (1/2 cup) of any fruit juice  · 8 ounces (1 cup) of milk  · 5 to 6 pieces of hard candy  · 1 tablespoon of honey  Check your blood sugar 15 minutes after treating yourself. If it is still below 70, take 15 to 20 more grams of fast-acting sugar. Test again in 15 minutes. If it returns to normal (70 or above), eat a snack or meal to keep your blood sugar in a safe range. If it stays low, call your doctor or go to an emergency room.  Follow-up care  Follow-up with your healthcare provider, or as advised. For more information about diabetes, visit the American Diabetes Association website at www.diabetes.org or call 256-323-8400.  When to seek  medical advice  Call your healthcare provider right away if you have any of these symptoms of high blood sugar:  · Frequent urination  · Dizziness  · Drowsiness  · Thirst  · Headache  · Nausea or vomiting  · Abdominal pain  · Eyesight changes  · Fast breathing  · Confusion or loss of consciousness  Also call your provider right away if you have any of these signs of low blood sugar:  · Fatigue  · Headache  · Shakes  · Excess sweating  · Hunger  · Feeling anxious or restless  · Eyesight changes  · Drowsiness  · Weakness  · Confusion or loss of consciousness  Call 911  Call for emergency help right away if any of these occur:  · Chest pain or shortness of breath  · Dizziness or fainting  · Weakness of an arm or leg or one side of the face  · Trouble speaking or seeing   Date Last Reviewed: 6/1/2016 © 2000-2016 [employer]. [address] [place] [address], [place] [address]. All rights reserved. This information is not intended as a substitute for professional medical care. Always follow your healthcare professional's instructions.        Weight Management: Getting Started  Healthy bodies come in all shapes and sizes. Not all bodies are made to be thin. For some people, a healthy weight is higher than the average weight listed on weight charts. Your healthcare provider can help you decide on a healthy weight for you.    Reasons to lose weight  Losing weight can help with some health problems, such as high blood pressure, heart disease, diabetes, sleep apnea, and arthritis. You may also feel more energy.  Set your long-term goal  Your goal doesn't even have to be a specific weight. You may decide on a fitness goal (such as being able to walk 10 miles a week), or a health goal (such as lowering your blood pressure). Choose a goal that is measurable and reasonable, so you know when you've reached it. A goal of reaching a BMI of less than 25 is not always reasonable (or possible).   Make an action plan  Habits dont  change overnight. Setting your goals too high can leave you feeling discouraged if you cant reach them. Be realistic. Choose one or two small changes you can make now. Set an action plan for how you are going to make these changes. When you can stick to this plan, keep making a few more small changes. Taking small steps will help you stay on the path to success.  Track your progress  Write down your goals. Then, keep a daily record of your progress. Write down what you eat and how active you are. This record lets you look back on how much youve done. It may also help when youre feeling frustrated. Reward yourself for success. Even if you dont reach every goal, give yourself credit for what you do get done.  Get support  Encouragement from others can help make losing weight easier. Ask your family members and friends for support. They may even want to join you. Also look to your healthcare provider, registered dietitian, and  for help. Your local hospital can give you more information about nutrition, exercise, and weight loss.  Date Last Reviewed: 1/31/2016  © 0064-4400 [employer]. [address] [place] [address]. All rights reserved. This information is not intended as a substitute for professional medical care. Always follow your healthcare professional's instructions.        Walking for Fitness  Fitness walking has something for everyone, even people who are already fit. Walking is one of the safest ways to condition your body aerobically. It can boost energy, help you lose weight, and reduce stress.    Physical benefits  · Walking strengthens your heart and lungs, and tones your muscles.  · When walking, your feet land with less impact than in other sports. This reduces chances of muscle, bone, and joint injury.  · Regular walking improves your cholesterol levels and lowers your risk of heart disease. And it helps you control your blood sugar if you have  diabetes.  · Walking is a weight-bearing activity, which helps maintain bone density. This can help prevent osteoporosis.  Personal rewards  · Taking walks can help you relax and manage stress. And fitness walking may make you feel better about yourself.  · Walking can help you sleep better at night and make you less likely to be depressed.  · Regular walking may help maintain your memory as you get older.  · Walking is a great way to spend extra time with friends and family members. Be sure to invite your dog along!  Q&A about fitness walking  Q: Will walking keep me fit?  A: Yes. Regular walking at the right pace gives you all the benefits of other aerobic activities, such as jogging and swimming.  Q: Will walking help me lose weight and keep it off?  A: Yes. Per mile, walking can burn as many calories as jogging. Your health care provider can help work walking into your weight-loss plan.  Q: Is walking safe for my health?  A: Yes. Walking is safe if you have high blood pressure, diabetes, heart disease, or other conditions. Talk to your health care provider before you start.  Date Last Reviewed: 5/9/2015  © 4032-6299 [employer]. [address] [place] [address]. All rights reserved. This information is not intended as a substitute for professional medical care. Always follow your healthcare professional's instructions.             Language Assistance Services     ATTENTION: Language assistance services are available, free of charge. Please call [phone].      ATENCIÓN: Si habla español, tiene a bautista disposición servicios gratuitos de asistencia lingüística. Llame al [phone].     [facility] Ý: N?u b?n nói Ti?ng Vi?t, có các d?ch v? h? tr? ngôn ng? mi?n phí dành cho b?n. G?i s? [phone].         [facility] complies with applicable Federal civil rights laws and does not discriminate on the basis of race, color, national origin, age, disability, or sex.

## 2017-05-24 DIAGNOSIS — H71.92 CHOLESTEATOMA OF LEFT EAR: Primary | ICD-10-CM

## 2017-06-05 ENCOUNTER — HOSPITAL ENCOUNTER (OUTPATIENT)
Dept: RADIOLOGY | Facility: HOSPITAL | Age: 62
Discharge: HOME | End: 2017-06-05
Attending: OTOLARYNGOLOGY
Payer: COMMERCIAL

## 2017-06-05 DIAGNOSIS — H71.92 CHOLESTEATOMA OF LEFT EAR: ICD-10-CM

## 2017-06-05 PROCEDURE — 70480 CT ORBIT/EAR/FOSSA W/O DYE: CPT | Mod: TC

## 2017-06-05 PROCEDURE — 70480 CT ORBIT/EAR/FOSSA W/O DYE: CPT | Mod: 26,,, | Performed by: RADIOLOGY

## 2017-07-13 ENCOUNTER — DOCUMENTATION ONLY (OUTPATIENT)
Dept: FAMILY MEDICINE | Facility: CLINIC | Age: 62
End: 2017-07-13

## 2017-07-13 NOTE — PROGRESS NOTES
Pre-Visit Chart Review  For Appointment Scheduled on 7/18/17.    Health Maintenance Due   Topic Date Due    Urine Microalbumin  06/06/1965    TETANUS VACCINE  06/06/1973    Pneumococcal PPSV23 (Medium Risk) (1) 06/06/1973    Zoster Vaccine  06/06/2015    Eye Exam  06/18/2016

## 2017-08-06 DIAGNOSIS — G43.909 MIGRAINE WITHOUT STATUS MIGRAINOSUS, NOT INTRACTABLE, UNSPECIFIED MIGRAINE TYPE: ICD-10-CM

## 2017-08-07 RX ORDER — ZOLMITRIPTAN 5 MG/1
SPRAY, METERED NASAL
Qty: 30 EACH | Refills: 0 | Status: SHIPPED | OUTPATIENT
Start: 2017-08-07 | End: 2017-09-06

## 2017-10-05 DIAGNOSIS — E11.9 TYPE 2 DIABETES MELLITUS WITHOUT COMPLICATION: ICD-10-CM

## 2017-10-13 ENCOUNTER — DOCUMENTATION ONLY (OUTPATIENT)
Dept: FAMILY MEDICINE | Facility: CLINIC | Age: 62
End: 2017-10-13

## 2017-10-13 NOTE — PROGRESS NOTES
Pre-Visit Chart Review  For Appointment Scheduled on 10/16/2017    Health Maintenance Due   Topic Date Due    Urine Microalbumin  06/06/1965    TETANUS VACCINE  06/06/1973    Pneumococcal PPSV23 (Medium Risk) (1) 06/06/1973    Zoster Vaccine  06/06/2015    Eye Exam  06/18/2016    Influenza Vaccine  08/01/2017    Lipid Panel  09/10/2017    Hemoglobin A1c  09/11/2017    Foot Exam  12/01/2017

## 2017-10-16 ENCOUNTER — OFFICE VISIT (OUTPATIENT)
Dept: FAMILY MEDICINE | Facility: CLINIC | Age: 62
End: 2017-10-16
Payer: COMMERCIAL

## 2017-10-16 VITALS
TEMPERATURE: 98 F | WEIGHT: 244.25 LBS | SYSTOLIC BLOOD PRESSURE: 102 MMHG | BODY MASS INDEX: 37.02 KG/M2 | HEART RATE: 57 BPM | HEIGHT: 68 IN | DIASTOLIC BLOOD PRESSURE: 62 MMHG

## 2017-10-16 DIAGNOSIS — E11.8 TYPE 2 DIABETES MELLITUS WITH COMPLICATION, WITHOUT LONG-TERM CURRENT USE OF INSULIN: ICD-10-CM

## 2017-10-16 DIAGNOSIS — G47.33 OBSTRUCTIVE SLEEP APNEA ON CPAP: ICD-10-CM

## 2017-10-16 DIAGNOSIS — Z23 NEED FOR VACCINATION FOR PNEUMOCOCCUS: ICD-10-CM

## 2017-10-16 DIAGNOSIS — G47.33 OSA (OBSTRUCTIVE SLEEP APNEA): Primary | ICD-10-CM

## 2017-10-16 DIAGNOSIS — K76.0 FATTY INFILTRATION OF LIVER: ICD-10-CM

## 2017-10-16 DIAGNOSIS — R10.13 EPIGASTRIC PAIN: ICD-10-CM

## 2017-10-16 PROCEDURE — 90471 IMMUNIZATION ADMIN: CPT | Mod: S$GLB,,, | Performed by: NURSE PRACTITIONER

## 2017-10-16 PROCEDURE — 99214 OFFICE O/P EST MOD 30 MIN: CPT | Mod: 25,S$GLB,, | Performed by: NURSE PRACTITIONER

## 2017-10-16 PROCEDURE — 99999 PR PBB SHADOW E&M-EST. PATIENT-LVL V: CPT | Mod: PBBFAC,,, | Performed by: NURSE PRACTITIONER

## 2017-10-16 PROCEDURE — 90732 PPSV23 VACC 2 YRS+ SUBQ/IM: CPT | Mod: S$GLB,,, | Performed by: NURSE PRACTITIONER

## 2017-10-16 RX ORDER — ROSUVASTATIN CALCIUM 10 MG/1
TABLET, COATED ORAL
COMMUNITY
Start: 2017-10-13 | End: 2019-01-25

## 2017-10-16 NOTE — PATIENT INSTRUCTIONS
Medicines for Acid Reflux  Your healthcare provider has told you that you have acid reflux. This condition causes stomach acid to wash up into your throat. For most people, acid reflux is troubling but not dangerous. But left untreated, acid reflux sometimes damages the esophagus. Medicines can help control acid reflux and limit your risk of future problems.  Medicines for acid reflux  Your healthcare provider may prescribe medicine to help treat your acid reflux. Medicine will be based on your symptoms and any test results. Your provider will explain how to take your medicine. You will also be told about possible side effects.  Reducing stomach acid  Your provider may suggest antacids that you can buy over the counter. Antacids can give fast relief. Or you may be told to take a type of medicine called H2 blockers. These are available over the counter and by prescription (for higher doses).  Blocking stomach acid  In more severe cases, your healthcare provider may suggest stronger medicines such as proton pump inhibitors (PPIs). These keep the stomach from making acid. They are often prescribed for long-term use.  Other medicines  In some cases medicines to reduce or block stomach acid may not work. Then you may be switched to another type of medicine that helps your stomach empty better.     Date Last Reviewed: 10/1/2016  © 7105-8217 [employer]. [address] [place] [address], [place] [address]. All rights reserved. This information is not intended as a substitute for professional medical care. Always follow your healthcare professional's instructions.

## 2017-10-16 NOTE — PROGRESS NOTES
"Subjective:       Patient ID: Clive Guzman is a 62 y.o. male.    Chief Complaint: sounds are distorted in left ear; Gastroesophageal Reflux; and Sleep Apnea    Mr. Guzman presents to the clinic today for several problems.  He states he recently had an ear infection and has had hearing loss since then.  He saw Dr. Abreu who told him there was nothing wrong with his ears but his could be a neurological problem.  He does have a neurologist is [place] and will schedule an appointment.  He also complains of fatigue and thinks he needs another sleep study.  He wears a CPAP and has not had sleep study for several years.  He has had CPAP for 20 years.  He also complains of epigastric pain only when he eats "too fast".  He has not noticed this worsening when he eats certain foods.  He takes Prilosec and this helps mildly.  He denies nausea, vomiting, diarrhea, or constipation.  No early satiety.        Review of Systems   Constitutional: Positive for fatigue. Negative for chills and fever.   HENT: Positive for hearing loss. Negative for congestion, ear pain, sinus pressure and tinnitus.    Respiratory: Negative for cough, shortness of breath and wheezing.    Cardiovascular: Negative for chest pain, palpitations and leg swelling.   Gastrointestinal: Positive for abdominal pain. Negative for abdominal distention, blood in stool, constipation, diarrhea, nausea and vomiting.   Neurological: Negative for dizziness and headaches.       Objective:      Physical Exam   Constitutional: He is oriented to person, place, and time. He appears well-developed and well-nourished. No distress.   HENT:   Head: Normocephalic and atraumatic.   Right Ear: External ear normal.   Left Ear: External ear normal.   Mouth/Throat: Oropharynx is clear and moist. No oropharyngeal exudate.   Eyes: Pupils are equal, round, and reactive to light. Right eye exhibits no discharge. Left eye exhibits no discharge.   Neck: Neck supple. No thyromegaly " present.   Cardiovascular: Normal rate and regular rhythm.  Exam reveals no gallop and no friction rub.    No murmur heard.  Pulses:       Dorsalis pedis pulses are 2+ on the right side, and 2+ on the left side.        Posterior tibial pulses are 2+ on the right side, and 2+ on the left side.   Pulmonary/Chest: Effort normal and breath sounds normal. No respiratory distress. He has no wheezes. He has no rales.   Abdominal: Soft. Bowel sounds are normal. He exhibits no distension. There is tenderness (mild generalized). A hernia (umbilical) is present.   Diastasis recti   Musculoskeletal:        Right foot: There is normal range of motion and no deformity.        Left foot: There is normal range of motion and no deformity.   Feet:   Right Foot:   Skin Integrity: Negative for ulcer, blister, skin breakdown, erythema, warmth, callus or dry skin.   Left Foot:   Skin Integrity: Negative for ulcer, blister, skin breakdown, erythema, warmth, callus or dry skin.   Lymphadenopathy:     He has no cervical adenopathy.   Neurological: He is alert and oriented to person, place, and time. Coordination normal.   Skin: Skin is warm and dry.   Psychiatric: He has a normal mood and affect. His behavior is normal. Thought content normal.   Vitals reviewed.          Current Outpatient Prescriptions:     aspirin (ASPIR-81) 81 MG EC tablet, Take 1 tablet by mouth Daily. Every day, Disp: , Rfl:     blood sugar diagnostic Strp, One touch test strips    DAILY testing, Disp: 30 strip, Rfl: 11    blood sugar diagnostic Strp, 1 strip by Misc.(Non-Drug; Combo Route) route once daily., Disp: 200 strip, Rfl: 11    cholecalciferol, vitamin D3, 1,000 unit capsule, Take 1 capsule by mouth Daily. Every day, Disp: , Rfl:     DYMISTA 137-50 mcg/spray Spry, , Disp: , Rfl: 5    fenofibrate 160 MG Tab, Take 1 tablet (160 mg total) by mouth once daily., Disp: 90 tablet, Rfl: 3    fluocinonide (LIDEX) 0.05 % ointment, Spot treat itchy bumps ankles  and hands BID PRN lfare, Disp: 60 g, Rfl: 1    hydrocortisone 2.5 % cream, Apply topically 2 (two) times daily., Disp: 20 g, Rfl: 2    lancets 33 gauge Misc, 1 lancet by Misc.(Non-Drug; Combo Route) route once daily., Disp: 100 each, Rfl: 4    magnesium oxide-Mg AA chelate 133 mg Tab, Take 1 tablet by mouth., Disp: , Rfl:     metformin (GLUCOPHAGE) 500 MG tablet, Take 1 tablet (500 mg total) by mouth 2 (two) times daily with meals., Disp: 180 tablet, Rfl: 3    nadolol (CORGARD) 20 MG tablet, Take 1 tablet (20 mg total) by mouth once daily., Disp: 60 tablet, Rfl: 5    omeprazole (PRILOSEC) 40 MG capsule, Take 1 capsule (40 mg total) by mouth once daily., Disp: 90 capsule, Rfl: 3    ranitidine (ZANTAC) 150 MG tablet, Take 1 tablet (150 mg total) by mouth 2 (two) times daily., Disp: 60 tablet, Rfl: 11    rosuvastatin (CRESTOR) 10 MG tablet, , Disp: , Rfl:     zolmitriptan (ZOMIG) 5 MG tablet, Take 1 tablet (5 mg total) by mouth 2 (two) times daily as needed for Migraine., Disp: 9 tablet, Rfl: 5  Assessment:       1. FRANSISCO (obstructive sleep apnea)    2. Epigastric pain    3. Need for vaccination for pneumococcus    4. Type 2 diabetes mellitus with complication, without long-term current use of insulin    5. Obstructive sleep apnea on CPAP    6. Fatty infiltration of liver        Plan:       FRANSISCO (obstructive sleep apnea)  -     Ambulatory consult to Sleep Disorders    Epigastric pain  Consider referral to GI if no improvement.  Eat slowly to prevent overeating.  -     US Abdomen Complete; Future; Expected date: 10/16/2017  -     ranitidine (ZANTAC) 150 MG tablet; Take 1 tablet (150 mg total) by mouth 2 (two) times daily.  Dispense: 60 tablet; Refill: 11    Need for vaccination for pneumococcus  -     (In Office Administered) Pneumococcal Polysaccharide Vaccine (23 Valent) (SQ/IM)    Type 2 diabetes mellitus with complication, without long-term current use of insulin  Due for routine follow up.    Patient will  schedule with PCP team.    Obstructive sleep apnea on CPAP    Fatty infiltration of liver  -     Ambulatory Referral to Hepatology    Patient readiness: acceptance and barriers:none    During the course of the visit the patient was educated and counseled about the following:     Diabetes:  Discussed general issues about diabetes pathophysiology and management.    Goals: Diabetes: Maintain Hemoglobin A1C below 7    Did patient meet goals/outcomes: No    The following self management tools provided: declined    Patient Instructions (the written plan) was given to the patient/family.     Time spent with patient: 30 minutes

## 2017-10-19 ENCOUNTER — HOSPITAL ENCOUNTER (OUTPATIENT)
Dept: RADIOLOGY | Facility: CLINIC | Age: 62
Discharge: HOME | End: 2017-10-19
Attending: NURSE PRACTITIONER
Payer: COMMERCIAL

## 2017-10-19 DIAGNOSIS — R10.13 EPIGASTRIC PAIN: ICD-10-CM

## 2017-10-19 PROCEDURE — 76700 US EXAM ABDOM COMPLETE: CPT | Mod: 26,,, | Performed by: RADIOLOGY

## 2017-10-19 PROCEDURE — 76700 US EXAM ABDOM COMPLETE: CPT | Mod: TC,PO

## 2017-10-22 ENCOUNTER — DOCUMENTATION ONLY (OUTPATIENT)
Dept: TRANSPLANT | Facility: CLINIC | Age: 62
End: 2017-10-22

## 2017-10-22 NOTE — LETTER
October 22, 2017    Clive Guzman  [address] [place] [id number]      Dear Clive Guzman:    Your doctor has referred you to the [facility]. You will be contacted by our office and an initial appointment will then be scheduled for you.    We look forward to seeing you soon. If you have any further questions, please contact us at [phone].       Sincerely,        [facility]   [address] [place] [phone]

## 2017-10-23 NOTE — NURSING
Pt records reviewed.   Pt will be referred to Hepatology.    Initial referral received  from the [facility]que.   Referring Provider/diagnosis  DAVON FRANCO Provider:   Diagnosis: Fatty infiltration of liver     Referral letter sent to provider and patient.

## 2017-10-25 ENCOUNTER — TELEPHONE (OUTPATIENT)
Dept: FAMILY MEDICINE | Facility: CLINIC | Age: 62
End: 2017-10-25

## 2017-10-25 NOTE — TELEPHONE ENCOUNTER
----- Message from Rhonda Flynn sent at 10/20/2017  3:43 PM CDT -----  Contact: self  Patient called regarding missed call. Please contact [phone]

## 2017-10-26 ENCOUNTER — TELEPHONE (OUTPATIENT)
Dept: FAMILY MEDICINE | Facility: CLINIC | Age: 62
End: 2017-10-26

## 2017-10-26 NOTE — TELEPHONE ENCOUNTER
External lab received; scheduled with patient 6 month followup PCP 11/30/17. Lab in upcoming appointment folder

## 2017-10-27 ENCOUNTER — TELEPHONE (OUTPATIENT)
Dept: HEPATOLOGY | Facility: CLINIC | Age: 62
End: 2017-10-27

## 2017-10-27 NOTE — TELEPHONE ENCOUNTER
Ma attempted to call patient back, he is unable to reached left him VM to please give us a callback. ROX

## 2017-10-27 NOTE — TELEPHONE ENCOUNTER
----- Message from Tamiko Centeno sent at 10/27/2017  8:15 AM CDT -----  Contact: patient   Patient would like a call to move his appt up if anyone cancels that morning.         Please call [phone]       Thanks!

## 2017-11-01 ENCOUNTER — LAB VISIT (OUTPATIENT)
Dept: LAB | Facility: HOSPITAL | Age: 62
End: 2017-11-01
Payer: COMMERCIAL

## 2017-11-01 ENCOUNTER — PROCEDURE VISIT (OUTPATIENT)
Dept: HEPATOLOGY | Facility: CLINIC | Age: 62
End: 2017-11-01
Attending: NURSE PRACTITIONER
Payer: COMMERCIAL

## 2017-11-01 ENCOUNTER — OFFICE VISIT (OUTPATIENT)
Dept: HEPATOLOGY | Facility: CLINIC | Age: 62
End: 2017-11-01
Payer: COMMERCIAL

## 2017-11-01 ENCOUNTER — PATIENT MESSAGE (OUTPATIENT)
Dept: HEPATOLOGY | Facility: CLINIC | Age: 62
End: 2017-11-01

## 2017-11-01 VITALS
OXYGEN SATURATION: 98 % | HEART RATE: 51 BPM | DIASTOLIC BLOOD PRESSURE: 63 MMHG | HEIGHT: 68 IN | BODY MASS INDEX: 35.52 KG/M2 | TEMPERATURE: 98 F | WEIGHT: 234.38 LBS | RESPIRATION RATE: 18 BRPM | SYSTOLIC BLOOD PRESSURE: 133 MMHG

## 2017-11-01 DIAGNOSIS — E11.8 TYPE 2 DIABETES MELLITUS WITH COMPLICATION, WITHOUT LONG-TERM CURRENT USE OF INSULIN: ICD-10-CM

## 2017-11-01 DIAGNOSIS — E66.9 OBESITY (BMI 30-39.9): ICD-10-CM

## 2017-11-01 DIAGNOSIS — E78.5 HYPERLIPIDEMIA, UNSPECIFIED HYPERLIPIDEMIA TYPE: ICD-10-CM

## 2017-11-01 DIAGNOSIS — K76.0 NAFLD (NONALCOHOLIC FATTY LIVER DISEASE): ICD-10-CM

## 2017-11-01 DIAGNOSIS — K76.0 FATTY LIVER: ICD-10-CM

## 2017-11-01 DIAGNOSIS — K76.0 FATTY LIVER: Primary | ICD-10-CM

## 2017-11-01 LAB
ALBUMIN SERPL BCP-MCNC: 3.6 G/DL
ALP SERPL-CCNC: 33 U/L
ALT SERPL W/O P-5'-P-CCNC: 23 U/L
ANION GAP SERPL CALC-SCNC: 7 MMOL/L
AST SERPL-CCNC: 19 U/L
BASOPHILS # BLD AUTO: 0.07 K/UL
BASOPHILS NFR BLD: 1.4 %
BILIRUB SERPL-MCNC: 0.6 MG/DL
BUN SERPL-MCNC: 18 MG/DL
CALCIUM SERPL-MCNC: 9.5 MG/DL
CHLORIDE SERPL-SCNC: 104 MMOL/L
CO2 SERPL-SCNC: 29 MMOL/L
CREAT SERPL-MCNC: 0.9 MG/DL
DIFFERENTIAL METHOD: ABNORMAL
EOSINOPHIL # BLD AUTO: 0.1 K/UL
EOSINOPHIL NFR BLD: 1.2 %
ERYTHROCYTE [DISTWIDTH] IN BLOOD BY AUTOMATED COUNT: 12.9 %
EST. GFR  (AFRICAN AMERICAN): >60 ML/MIN/1.73 M^2
EST. GFR  (NON AFRICAN AMERICAN): >60 ML/MIN/1.73 M^2
GLUCOSE SERPL-MCNC: 105 MG/DL
HBV SURFACE AG SERPL QL IA: NEGATIVE
HCT VFR BLD AUTO: 40.6 %
HGB BLD-MCNC: 13.4 G/DL
IMM GRANULOCYTES # BLD AUTO: 0.01 K/UL
IMM GRANULOCYTES NFR BLD AUTO: 0.2 %
INR PPP: 1.1
LYMPHOCYTES # BLD AUTO: 1.8 K/UL
LYMPHOCYTES NFR BLD: 35.9 %
MCH RBC QN AUTO: 29.6 PG
MCHC RBC AUTO-ENTMCNC: 33 G/DL
MCV RBC AUTO: 90 FL
MONOCYTES # BLD AUTO: 0.5 K/UL
MONOCYTES NFR BLD: 9.6 %
NEUTROPHILS # BLD AUTO: 2.6 K/UL
NEUTROPHILS NFR BLD: 51.7 %
NRBC BLD-RTO: 0 /100 WBC
PLATELET # BLD AUTO: 251 K/UL
PMV BLD AUTO: 10.7 FL
POTASSIUM SERPL-SCNC: 4.8 MMOL/L
PROT SERPL-MCNC: 7.4 G/DL
PROTHROMBIN TIME: 11.2 SEC
RBC # BLD AUTO: 4.52 M/UL
SODIUM SERPL-SCNC: 140 MMOL/L
WBC # BLD AUTO: 5.02 K/UL

## 2017-11-01 PROCEDURE — 99999 PR PBB SHADOW E&M-EST. PATIENT-LVL V: CPT | Mod: PBBFAC,,, | Performed by: NURSE PRACTITIONER

## 2017-11-01 PROCEDURE — 91200 LIVER ELASTOGRAPHY: CPT | Mod: S$GLB,,, | Performed by: NURSE PRACTITIONER

## 2017-11-01 PROCEDURE — 85610 PROTHROMBIN TIME: CPT

## 2017-11-01 PROCEDURE — 36415 COLL VENOUS BLD VENIPUNCTURE: CPT

## 2017-11-01 PROCEDURE — 99203 OFFICE O/P NEW LOW 30 MIN: CPT | Mod: S$GLB,,, | Performed by: NURSE PRACTITIONER

## 2017-11-01 PROCEDURE — 87340 HEPATITIS B SURFACE AG IA: CPT

## 2017-11-01 PROCEDURE — 85025 COMPLETE CBC W/AUTO DIFF WBC: CPT

## 2017-11-01 PROCEDURE — 80053 COMPREHEN METABOLIC PANEL: CPT

## 2017-11-01 NOTE — PROGRESS NOTES
I have personally performed a face to face diagnostic evaluation on this patient. I have reviewed and agree with today's findings and the care plan outlined by Donya Burns NP with following comments:    Mr. Guzman was referred to Hepatology for NAFLD. His risk factors are obesity, dyslipidemia and diabetes. I agree with Donya's recommendation of obtaining VCTE(fibroscan) to assess steatosis and estimated fibrosis. We have counseled the patient regarding the management of NAFLD - tight control of diabetes and dyslipidemia and life-style modifications: weight loss, exercise. He should continue statin drug.     The patient will return to Donya Burns NP  for follow-up.     Jaiden Oquendo MD   Hepatology  [facility]

## 2017-11-01 NOTE — PATIENT INSTRUCTIONS
1. Labs today  2. Fibroscan to assess fibrosis  3. Weight loss goal of 15-20 lbs  4. Low fat, low cholesterol, low carb, high fiber diet  5. Exercise, 5 days a week, 30 min a day  6. Recommend good control of cholesterol, blood pressure, blood sugar levels  7. Follow up pending results      Nonalcoholic Fatty Liver Disease (NAFLD)  Nonalcoholic fatty liver disease (NAFLD) is a common disease of the liver. It occurs when you have too much fat in the liver. If NAFLD is severe, it can cause liver damage that seems like the damage caused by drinking too much alcohol. But NAFLD is not caused by drinking alcohol. This sheet tells you more about NAFLD and how it can be managed.    How the liver works   The liver is an organ in the upper right side of the belly (abdomen). It has many important jobs. These include:  · Breaking down (metabolizing) proteins, carbohydrates, and fats  · Making a substance called bile that helps break down fats  · Storing and releasing sugar (glucose) into the blood to give the body energy  · Removing toxins from the blood  · Helping with blood clotting  Understanding NAFLD  A healthy liver may contain some fat. But if too much fat builds up in the liver, this causes NAFLD. NAFLD can be mild, causing fatty liver. Or it can be more severe and show inflammation, as well as the fat. This can cause non-alcoholic steatohepatitis (HARRELL).  · Fatty liver. With fatty liver, the liver simply has more fat than normal. This extra fat usually does not harm the liver.  · HARRELL. With HARRELL, the fatty liver becomes inflamed over time. HARRELL is serious because it can lead to scarring of the liver (fibrosis). Over time, the scarring may lead to cirrhosis of the liver. This can eventually cause liver failure or liver cancer.  Causes and risk factors of NAFLD  Doctors don't know what causes NAFLD. But certain things make the problem more likely to happen. These include:  · Obesity  · Prediabetes or diabetes  · High  levels of fat found in the blood (cholesterol and triglycerides)  · Being exposed to certain medicines   Symptoms of NAFLD  Most people with NAFLD have no symptoms. If symptoms do occur, they can include:  · Tiredness  · Weakness  · Weight loss  · Loss of appetite  · Nausea and vomiting  · Belly pain and cramping  · Yellowing of the skin and eyes (jaundice), as well as dark urine, or light-colored stools  · Swelling in the belly or legs  Diagnosing NAFLD  Your healthcare provider may think you have NAFLD if routine blood tests show high levels of liver enzymes. This may mean you have a liver problem. You may need one or more imaging tests, such as an ultrasound, CT, or MRI. You may need more blood tests to look for other causes of liver disease. You may also need a liver biopsy. During this test, a hollow needle is used to remove a tiny tissue sample from your liver. This tissue is then checked in a lab. This test can find signs of damage to liver tissue. It can also help figure out the cause of the damage and tell the difference between fatty liver and HARRELL.  Treating NAFLD  Treatment for NAFLD varies for each person. The best early treatment is to treat any underlying conditions causing metabolic syndrome. This is the name for a group of conditions that includes:  · High blood pressure  · High levels of cholesterol and triglycerides  · Being overweight or obese  · Diabetes  Your healthcare provider will monitor your health and treat any symptoms or underlying health problems you have. Your provider will also work with you to control your risk factors. This will make liver damage less likely. In fact, treating those underlying conditions can often improve liver disease. You may need to take certain medicines, but no medicine will cure NAFLD. This is why treating the underlying conditions is most important. Your plan may include:  · Losing extra weight  · Getting regular exercise  · Controlling diabetes and high  cholesterol or triglyceride levels  · Taking medicines and vitamins as prescribed by your provider  · Quitting smoking  · Not drinking alcohol  · Eating a healthy and balanced diet  Living with NAFLD  If NAFLD is caught early, it can be managed with treatment. Your healthcare provider will discuss further treatment choices with you as needed.  Be sure to ask your provider about recommended vaccines. These include vaccines for viruses that can cause liver disease.  Date Last Reviewed: 12/1/2016  © 6280-9811 [employer] [address] [place] [address], [place] [address]. All rights reserved. This information is not intended as a substitute for professional medical care. Always follow your healthcare professional's instructions.

## 2017-11-01 NOTE — PROGRESS NOTES
[facility] VISIT NEW PT NOTE    REFERRING PROVIDER: Lyn Sanchez NP    CHIEF COMPLAINT: fatty liver    HPI: This is a 62 y.o. White male with PMH of DM, HLD, migraines, FRANSISCO referred for evaluation of fatty liver. He has had fatty liver noted on imaging since 2016. Liver mildly enlarged at 17.5 cm with steatosis. Spleen nl. He has normal liver enzymes and liver functioning on labs. He does not drink alcohol. No family h/o liver disease. His DM and cholesterol are well controlled. He does not currently exercise. Follows diabetic diet. He reports weight got down to 200 lbs few yrs ago when he was laid off for 6 months and was able to exercise more. He works at [employer] as an . He reports he feels well, denies any symptoms of advanced chronic liver disease including jaundice, dark urine, abdominal distention, hematemesis, melena, slowed mentation.         Review of patient's allergies indicates:   Allergen Reactions    Lipitor  [atorvastatin]      Other reaction(s): achiness       Current Outpatient Prescriptions on File Prior to Visit   Medication Sig Dispense Refill    aspirin (ASPIR-81) 81 MG EC tablet Take 1 tablet by mouth Daily. Every day      blood sugar diagnostic Strp One touch test strips    DAILY testing 30 strip 11    blood sugar diagnostic Strp 1 strip by Misc.(Non-Drug; Combo Route) route once daily. 200 strip 11    cholecalciferol, vitamin D3, 1,000 unit capsule Take 1 capsule by mouth Daily. Every day      DYMISTA 137-50 mcg/spray Spry   5    fenofibrate 160 MG Tab Take 1 tablet (160 mg total) by mouth once daily. 90 tablet 3    fluocinonide (LIDEX) 0.05 % ointment Spot treat itchy bumps ankles and hands BID PRN lfare 60 g 1    lancets 33 gauge Misc 1 lancet by Misc.(Non-Drug; Combo Route) route once daily. 100 each 4    magnesium oxide-Mg AA chelate 133 mg Tab Take 1 tablet by mouth.      metformin (GLUCOPHAGE) 500 MG tablet Take 1 tablet (500 mg total) by mouth 2 (two)  times daily with meals. 180 tablet 3    nadolol (CORGARD) 20 MG tablet Take 1 tablet (20 mg total) by mouth once daily. 60 tablet 5    omeprazole (PRILOSEC) 40 MG capsule Take 1 capsule (40 mg total) by mouth once daily. 90 capsule 3    ranitidine (ZANTAC) 150 MG tablet Take 1 tablet (150 mg total) by mouth 2 (two) times daily. 60 tablet 11    rosuvastatin (CRESTOR) 10 MG tablet       zolmitriptan (ZOMIG) 5 MG tablet Take 1 tablet (5 mg total) by mouth 2 (two) times daily as needed for Migraine. 9 tablet 5    hydrocortisone 2.5 % cream Apply topically 2 (two) times daily. 20 g 2     No current facility-administered medications on file prior to visit.        PMHX:  has a past medical history of Diabetes mellitus, type 2; GERD (gastroesophageal reflux disease); Hyperlipidemia; Migraines; Obstructive sleep apnea on CPAP; and Rash and other nonspecific skin eruption (8/20/2012).    PSHX:  has a past surgical history that includes Hernia repair and Colonoscopy.    FAMILY HISTORY: Negative for liver disease, reviewed in [facility]    SOCIAL HISTORY:   History   Smoking Status    Never Smoker   Smokeless Tobacco    Never Used       History   Alcohol Use No       History   Drug Use No       ROS:   GENERAL: Denies fever, chills, weight loss/gain, fatigue  HEENT: Denies headaches, dizziness, vision/hearing changes  CARDIOVASCULAR: Denies chest pain, palpitations, or edema  RESPIRATORY: Denies dyspnea, cough  GI: Denies abdominal pain, rectal bleeding, nausea, vomiting. No change in bowel pattern or color  : Denies dysuria, hematuria   SKIN: Denies rash, itching   NEURO: Denies confusion, memory loss, or mood changes  PSYCH: Denies depression or anxiety  HEME/LYMPH: Denies easy bruising or bleeding        PHYSICAL EXAM:   Friendly White male, in no acute distress; alert and oriented to person, place and time  VITALS: /63 (BP Location: Left arm, Patient Position: Sitting)   Pulse (!) 51   Temp 97.5 °F (36.4 °C)  "(Oral)   Resp 18   Ht 5' 8" (1.727 m)   Wt 106.3 kg (234 lb 5.6 oz)   SpO2 98%   BMI 35.63 kg/m²   HENT: Normocephalic, without obvious abnormality. Oral mucosa pink and moist. Dentition good.  EYES: Sclerae anicteric.   NECK: Supple.  CARDIOVASCULAR: Regular rate and rhythm. No murmurs.  RESPIRATORY: Normal respiratory effort. BBS CTA. No wheezes or crackles.  GI: Soft, non-tender, non-distended. No hepatosplenomegaly. No masses palpable. No ascites.  EXTREMITIES:  No clubbing, cyanosis or edema.  SKIN: Warm and dry. No jaundice. No rashes noted to exposed skin. No telangectasias noted. No palmar erythema.  NEURO:  Normal gate. No asterixis.  PSYCH:  Memory intact. Thought and speech pattern appropriate. Behavior normal. No depression or anxiety noted.      RECENT LABS:    Labs:  Lab Results   Component Value Date    WBC 6.00 08/22/2016    HGB 13.8 (L) 08/22/2016    HCT 41.4 08/22/2016     08/22/2016    CHOL 184 09/10/2016    TRIG 117 09/10/2016    HDL 45 09/10/2016     09/10/2016    K 4.2 09/10/2016    CREATININE 1.0 09/10/2016    ALT 21 09/10/2016    AST 18 09/10/2016    ALKPHOS 40 (L) 09/10/2016    BILITOT 0.8 09/10/2016    ALBUMIN 3.4 (L) 09/10/2016       DIAGNOSTIC STUDIES:  ABD. U/S- 10/19/17  Findings:    The pancreas is obscured by bowel gas.  The abdominal aorta is non-aneurysmal.  The IVC is patent where visualized.    The liver measures 17.5 cm in sagittal dimension and shows diffuse increased attenuation of the ultrasound beam compatible with diffuse fatty infiltration of the liver.  No hepatic mass.  The portal vein shows normal directional flow.  No intra-or extrahepatic biliary dilatation, and the common bile duct measures 3 mm.  The gallbladder shows no evidence of shadowing stones, distention, or sonographic Woodward's sign.  The kidneys and spleen are unremarkable.   Impression      Hepatomegaly and diffuse fatty infiltration of the liver.       ASSESSMENT:  62 y.o. White male " with:  1.  NAFLD  -- transaminases normal  -- US shows hepatomegaly and fatty liver  -- synthetic liver function nl  -- risk factors for fatty liver include obesity, HLD, DM      EDUCATION:   We discussed the manifestations of NAFLD. At this time there is no FDA approved therapy for NAFLD.   The patient and I discussed the importance of diet, exercise, and weight loss for management of NAFLD. We discussed a low fat, low carb/low sugar, high fiber diet, and a goal of 30 minutes of exercise 5 times per week.   Pt is aware that fatty liver can progress to steatohepatitis and possibly to cirrhosis, putting one at increased risk for liver cancer, liver failure, and death.        PLAN:  1. Labs today  2. Fibroscan to assess fibrosis  3. Weight loss goal of 15-20 lbs  4. Low fat, low cholesterol, low carb, high fiber diet  5. Exercise, 5 days a week, 30 min a day  6. Recommend good control of cholesterol, blood pressure, blood sugar levels  7. Follow up pending results       Thank you for allowing me to participate in the care of RENETTA Mora    Addendum: Fibroscan = F0-F1, no fibrosis. Pt notified via [facility]. Does not need further f/u with us at this time since no fibrosis noted.

## 2017-11-01 NOTE — LETTER
November 1, 2017      Lyn Sanchez, FNP-C  [address]  [facility] [address]           [facility] - Hepatology  [address]  [place] [address]  Phone: [phone]  Fax: [phone]          Patient: Clive Guzman   MR Number: [id number]   YOB: 1955   Date of Visit: 11/1/2017       Dear Lyn Sanchez:    Thank you for referring Clive Guzman to me for evaluation. Attached you will find relevant portions of my assessment and plan of care.    If you have questions, please do not hesitate to call me. I look forward to following Clive Guzman along with you.    Sincerely,    Donya Burns, NP    Enclosure  CC:  No Recipients    If you would like to receive this communication electronically, please contact [email or web address] or [phone] to request more information on [employer] access.    For providers and/or their staff who would like to refer a patient to [facility], please contact us through our one-stop-shop provider referral line, [facility] [place], at [phone].    If you feel you have received this communication in error or would no longer like to receive these types of communications, please e-mail [email or web address]

## 2017-11-20 DIAGNOSIS — K21.9 GASTROESOPHAGEAL REFLUX DISEASE, ESOPHAGITIS PRESENCE NOT SPECIFIED: ICD-10-CM

## 2017-11-20 DIAGNOSIS — G43.909 MIGRAINE WITHOUT STATUS MIGRAINOSUS, NOT INTRACTABLE, UNSPECIFIED MIGRAINE TYPE: ICD-10-CM

## 2017-11-20 RX ORDER — NADOLOL 20 MG/1
TABLET ORAL
Qty: 60 TABLET | Refills: 3 | Status: SHIPPED | OUTPATIENT
Start: 2017-11-20 | End: 2018-02-26 | Stop reason: SDUPTHER

## 2017-11-21 RX ORDER — OMEPRAZOLE 40 MG/1
CAPSULE, DELAYED RELEASE ORAL
Qty: 90 CAPSULE | Refills: 1 | Status: SHIPPED | OUTPATIENT
Start: 2017-11-21 | End: 2018-02-26 | Stop reason: SDUPTHER

## 2017-11-22 DIAGNOSIS — Z13.5 DIABETIC RETINOPATHY SCREENING: ICD-10-CM

## 2017-11-23 DIAGNOSIS — E11.8 TYPE 2 DIABETES MELLITUS WITH COMPLICATION, WITHOUT LONG-TERM CURRENT USE OF INSULIN: ICD-10-CM

## 2017-11-23 RX ORDER — METFORMIN HYDROCHLORIDE 500 MG/1
TABLET ORAL
Qty: 180 TABLET | Refills: 0 | Status: SHIPPED | OUTPATIENT
Start: 2017-11-23 | End: 2018-02-26 | Stop reason: SDUPTHER

## 2017-11-29 ENCOUNTER — DOCUMENTATION ONLY (OUTPATIENT)
Dept: FAMILY MEDICINE | Facility: CLINIC | Age: 62
End: 2017-11-29

## 2017-11-29 NOTE — PROGRESS NOTES
Pre-Visit Chart Review  For Appointment Scheduled on 11/30/17.    Health Maintenance Due   Topic Date Due    Urine Microalbumin  06/06/1965    TETANUS VACCINE  06/06/1973    Zoster Vaccine  06/06/2015    Eye Exam  06/18/2016    Influenza Vaccine  08/01/2017    Lipid Panel  09/10/2017    Hemoglobin A1c  09/11/2017

## 2017-12-01 DIAGNOSIS — G43.909 MIGRAINE WITHOUT STATUS MIGRAINOSUS, NOT INTRACTABLE, UNSPECIFIED MIGRAINE TYPE: ICD-10-CM

## 2017-12-01 RX ORDER — ZOLMITRIPTAN 5 MG/1
TABLET, FILM COATED ORAL
Qty: 9 TABLET | Refills: 0 | Status: SHIPPED | OUTPATIENT
Start: 2017-12-01 | End: 2018-02-26 | Stop reason: SDUPTHER

## 2017-12-18 DIAGNOSIS — E78.5 HYPERLIPIDEMIA, UNSPECIFIED HYPERLIPIDEMIA TYPE: ICD-10-CM

## 2017-12-18 RX ORDER — FENOFIBRATE 160 MG/1
TABLET ORAL
Qty: 30 TABLET | Refills: 0 | Status: SHIPPED | OUTPATIENT
Start: 2017-12-18 | End: 2018-01-15 | Stop reason: SDUPTHER

## 2018-01-15 DIAGNOSIS — E78.5 HYPERLIPIDEMIA, UNSPECIFIED HYPERLIPIDEMIA TYPE: ICD-10-CM

## 2018-01-16 RX ORDER — FENOFIBRATE 160 MG/1
TABLET ORAL
Qty: 30 TABLET | Refills: 0 | Status: SHIPPED | OUTPATIENT
Start: 2018-01-16 | End: 2018-02-26 | Stop reason: SDUPTHER

## 2018-01-16 NOTE — TELEPHONE ENCOUNTER
Patient is scheduled for a labs on this Saturday. Informed patient an office visit is highly recommended with PCP/team. Understanding verbalized.

## 2018-01-16 NOTE — TELEPHONE ENCOUNTER
Received refill request for fenofibrate  He has not been seen or had lab for lipid and glucose in quite a while  Scheduled follow up with me or zeynep in the coming month

## 2018-01-27 ENCOUNTER — LAB VISIT (OUTPATIENT)
Dept: LAB | Facility: HOSPITAL | Age: 63
End: 2018-01-27
Attending: FAMILY MEDICINE
Payer: COMMERCIAL

## 2018-01-27 DIAGNOSIS — E11.9 TYPE 2 DIABETES MELLITUS WITHOUT COMPLICATION: ICD-10-CM

## 2018-01-27 LAB
CREAT UR-MCNC: 113 MG/DL
MICROALBUMIN UR DL<=1MG/L-MCNC: 4 UG/ML
MICROALBUMIN/CREATININE RATIO: 3.5 UG/MG

## 2018-01-27 PROCEDURE — 82043 UR ALBUMIN QUANTITATIVE: CPT

## 2018-02-19 ENCOUNTER — DOCUMENTATION ONLY (OUTPATIENT)
Dept: FAMILY MEDICINE | Facility: CLINIC | Age: 63
End: 2018-02-19

## 2018-02-19 NOTE — PROGRESS NOTES
Pre-Visit Chart Review  For Appointment Scheduled on 02/20/2018    Health Maintenance Due   Topic Date Due    TETANUS VACCINE  06/06/1973    Zoster Vaccine  06/06/2015    Eye Exam  06/18/2016    Influenza Vaccine  08/01/2017

## 2018-02-23 ENCOUNTER — DOCUMENTATION ONLY (OUTPATIENT)
Dept: FAMILY MEDICINE | Facility: CLINIC | Age: 63
End: 2018-02-23

## 2018-02-23 NOTE — PROGRESS NOTES
Pre-Visit Chart Review  For Appointment Scheduled on 02/26/2018    Health Maintenance Due   Topic Date Due    TETANUS VACCINE  06/06/1973    Zoster Vaccine  06/06/2015    Eye Exam  06/18/2016    Influenza Vaccine  08/01/2017

## 2018-02-26 ENCOUNTER — OFFICE VISIT (OUTPATIENT)
Dept: FAMILY MEDICINE | Facility: CLINIC | Age: 63
End: 2018-02-26
Payer: COMMERCIAL

## 2018-02-26 VITALS
TEMPERATURE: 98 F | HEART RATE: 56 BPM | BODY MASS INDEX: 36.5 KG/M2 | SYSTOLIC BLOOD PRESSURE: 112 MMHG | WEIGHT: 240.06 LBS | DIASTOLIC BLOOD PRESSURE: 60 MMHG

## 2018-02-26 DIAGNOSIS — G43.909 MIGRAINE WITHOUT STATUS MIGRAINOSUS, NOT INTRACTABLE, UNSPECIFIED MIGRAINE TYPE: ICD-10-CM

## 2018-02-26 DIAGNOSIS — K76.0 FATTY INFILTRATION OF LIVER: ICD-10-CM

## 2018-02-26 DIAGNOSIS — K21.9 GASTROESOPHAGEAL REFLUX DISEASE, ESOPHAGITIS PRESENCE NOT SPECIFIED: ICD-10-CM

## 2018-02-26 DIAGNOSIS — E78.5 HYPERLIPIDEMIA, UNSPECIFIED HYPERLIPIDEMIA TYPE: ICD-10-CM

## 2018-02-26 DIAGNOSIS — E11.8 TYPE 2 DIABETES MELLITUS WITH COMPLICATION, WITHOUT LONG-TERM CURRENT USE OF INSULIN: ICD-10-CM

## 2018-02-26 DIAGNOSIS — R10.13 EPIGASTRIC PAIN: Primary | ICD-10-CM

## 2018-02-26 PROCEDURE — 99999 PR PBB SHADOW E&M-EST. PATIENT-LVL IV: CPT | Mod: PBBFAC,,, | Performed by: PHYSICIAN ASSISTANT

## 2018-02-26 PROCEDURE — 3008F BODY MASS INDEX DOCD: CPT | Mod: S$GLB,,, | Performed by: PHYSICIAN ASSISTANT

## 2018-02-26 PROCEDURE — 99213 OFFICE O/P EST LOW 20 MIN: CPT | Mod: S$GLB,,, | Performed by: PHYSICIAN ASSISTANT

## 2018-02-26 RX ORDER — NADOLOL 20 MG/1
TABLET ORAL
Qty: 90 TABLET | Refills: 3 | Status: SHIPPED | OUTPATIENT
Start: 2018-02-26 | End: 2019-03-25 | Stop reason: SDUPTHER

## 2018-02-26 RX ORDER — ZOLMITRIPTAN 5 MG/1
1 SPRAY NASAL ONCE AS NEEDED
Qty: 6 EACH | Refills: 3 | Status: SHIPPED | OUTPATIENT
Start: 2018-02-26 | End: 2018-02-26

## 2018-02-26 RX ORDER — ZOLMITRIPTAN 5 MG/1
TABLET, FILM COATED ORAL
Qty: 9 TABLET | Refills: 3 | Status: SHIPPED | OUTPATIENT
Start: 2018-02-26 | End: 2019-07-28 | Stop reason: SDUPTHER

## 2018-02-26 RX ORDER — METFORMIN HYDROCHLORIDE 500 MG/1
500 TABLET ORAL 2 TIMES DAILY WITH MEALS
Qty: 180 TABLET | Refills: 3 | Status: SHIPPED | OUTPATIENT
Start: 2018-02-26 | End: 2019-03-25 | Stop reason: SDUPTHER

## 2018-02-26 RX ORDER — OMEPRAZOLE 40 MG/1
CAPSULE, DELAYED RELEASE ORAL
Qty: 90 CAPSULE | Refills: 3 | Status: SHIPPED | OUTPATIENT
Start: 2018-02-26 | End: 2019-03-25 | Stop reason: SDUPTHER

## 2018-02-26 RX ORDER — FENOFIBRATE 160 MG/1
TABLET ORAL
Qty: 90 TABLET | Refills: 3 | Status: SHIPPED | OUTPATIENT
Start: 2018-02-26 | End: 2019-01-30 | Stop reason: SDUPTHER

## 2018-02-26 NOTE — PROGRESS NOTES
Subjective:       Patient ID: Cliev Guzman is a 62 y.o. male.    Chief Complaint: Annual Exam    Patient with diabetes mellitus, hyperlipidemia, GERD,and migraines headaches presents for routine exam.  He is complaining of epigastric burning sensation.  The burning occurs if he eats too quickly.  He takes PPI for GERD.  He denies any melena.  He has occasional BRBPR when wiping after BM.  He denies rectal pain.  He denies nausea, vomiting, hematemesis, and coffee ground emesis.  He did not bring a glucose log today.  A1c is at goal.  Regarding his migraines he states the zomig works well.  He finds that the zomig spray works quickest but if he is having any nasal congestion he rather use the tablets.  He is requesting rx for both.        Review of Systems   Constitutional: Negative for activity change, appetite change, fatigue and unexpected weight change.   Respiratory: Negative for cough, chest tightness and shortness of breath.    Cardiovascular: Negative for chest pain, palpitations and leg swelling.   Gastrointestinal: Positive for abdominal pain (epigastric burning). Negative for abdominal distention, anal bleeding, blood in stool, constipation, diarrhea and nausea.   Endocrine: Negative for polydipsia and polyuria.   Genitourinary: Negative for difficulty urinating, frequency, hematuria and urgency.   Musculoskeletal: Negative for arthralgias.   Skin: Negative for rash.   Neurological: Negative for dizziness and headaches.   Psychiatric/Behavioral: Negative for dysphoric mood. The patient is not nervous/anxious.        Objective:      Physical Exam   Constitutional: He appears well-developed and well-nourished. He is cooperative. No distress.   Eyes: Conjunctivae and EOM are normal. No scleral icterus.   Neck: Carotid bruit is not present.   Cardiovascular: Normal rate, regular rhythm and normal heart sounds.    Pulmonary/Chest: Effort normal and breath sounds normal.   Abdominal: Soft. Bowel sounds are  normal. There is no tenderness.   Diastasis recti    Musculoskeletal:        Right lower leg: He exhibits no edema.        Left lower leg: He exhibits no edema.   Neurological: He is alert.   Vitals reviewed.      Assessment:       1. Epigastric pain    2. Fatty infiltration of liver    3. Hyperlipidemia, unspecified hyperlipidemia type    4. Type 2 diabetes mellitus with complication, without long-term current use of insulin    5. Gastroesophageal reflux disease, esophagitis presence not specified    6. Migraine without status migrainosus, not intractable, unspecified migraine type        Plan:   Clive was seen today for annual exam.    Diagnoses and all orders for this visit:    Epigastric pain  -     Ambulatory referral to Gastroenterology    Fatty infiltration of liver    Hyperlipidemia, unspecified hyperlipidemia type  -     fenofibrate 160 MG Tab; TAKE 1 TABLET(160 MG) BY MOUTH EVERY DAY    Type 2 diabetes mellitus with complication, without long-term current use of insulin  -     metFORMIN (GLUCOPHAGE) 500 MG tablet; Take 1 tablet (500 mg total) by mouth 2 (two) times daily with meals.  Did not order diabetic eye exam as patient states last exam done less than one year ago in [place]   Gastroesophageal reflux disease, esophagitis presence not specified  -     omeprazole (PRILOSEC) 40 MG capsule; TAKE 1 CAPSULE(40 MG) BY MOUTH EVERY DAY    Migraine without status migrainosus, not intractable, unspecified migraine type  -     ZOLMitriptan (ZOMIG) 5 MG tablet; TAKE 1 TABLET(5 MG) BY MOUTH TWICE DAILY AS NEEDED FOR MIGRAINE  -     nadolol (CORGARD) 20 MG tablet; TAKE 1 TABLET(20 MG) BY MOUTH EVERY DAY    Other orders  -     ZOLMitriptan (ZOMIG) 5 mg Spry; 1 spray by Nasal route once as needed.

## 2018-04-03 ENCOUNTER — INITIAL CONSULT (OUTPATIENT)
Dept: GASTROENTEROLOGY | Facility: CLINIC | Age: 63
End: 2018-04-03
Payer: COMMERCIAL

## 2018-04-03 VITALS
WEIGHT: 241.88 LBS | HEIGHT: 68 IN | DIASTOLIC BLOOD PRESSURE: 63 MMHG | HEART RATE: 58 BPM | BODY MASS INDEX: 36.66 KG/M2 | SYSTOLIC BLOOD PRESSURE: 111 MMHG

## 2018-04-03 DIAGNOSIS — Z12.12 SCREENING FOR COLORECTAL CANCER: ICD-10-CM

## 2018-04-03 DIAGNOSIS — K21.9 GASTROESOPHAGEAL REFLUX DISEASE, ESOPHAGITIS PRESENCE NOT SPECIFIED: ICD-10-CM

## 2018-04-03 DIAGNOSIS — E11.8 TYPE 2 DIABETES MELLITUS WITH COMPLICATION, WITHOUT LONG-TERM CURRENT USE OF INSULIN: Primary | ICD-10-CM

## 2018-04-03 DIAGNOSIS — R10.13 EPIGASTRIC PAIN: ICD-10-CM

## 2018-04-03 DIAGNOSIS — Z12.11 SCREENING FOR COLORECTAL CANCER: ICD-10-CM

## 2018-04-03 DIAGNOSIS — G47.33 OBSTRUCTIVE SLEEP APNEA ON CPAP: ICD-10-CM

## 2018-04-03 DIAGNOSIS — E66.9 OBESITY (BMI 30-39.9): ICD-10-CM

## 2018-04-03 PROCEDURE — 99204 OFFICE O/P NEW MOD 45 MIN: CPT | Mod: S$GLB,,, | Performed by: INTERNAL MEDICINE

## 2018-04-03 PROCEDURE — 99999 PR PBB SHADOW E&M-EST. PATIENT-LVL III: CPT | Mod: PBBFAC,,, | Performed by: INTERNAL MEDICINE

## 2018-04-03 PROCEDURE — 3044F HG A1C LEVEL LT 7.0%: CPT | Mod: CPTII,S$GLB,, | Performed by: INTERNAL MEDICINE

## 2018-04-03 NOTE — PROGRESS NOTES
Subjective:       Patient ID: Clive Guzman is a 62 y.o. male.    This is an established patient.        Chief Complaint: Abdominal Pain (epigastric burning)    Abdominal Pain   This is a new problem. The current episode started more than 1 year ago (several years ago). The onset quality is undetermined. The problem occurs intermittently. Duration: few minutes. The problem has been waxing and waning. The pain is located in the epigastric region. The pain is at a severity of 6/10. The pain is moderate. The quality of the pain is burning. The abdominal pain does not radiate. Pertinent negatives include no arthralgias, constipation, diarrhea, fever, myalgias, nausea or vomiting. Exacerbated by: eating too fast. The pain is relieved by belching. He has tried proton pump inhibitors for the symptoms. The treatment provided moderate (has had some improvement but still with some intermittent breakthrough) relief. Prior diagnostic workup includes lower endoscopy (Last colonoscopy with Dr. Nunn in 2008 and independently reviewed showing diverticulosis). His past medical history is significant for GERD. There is no history of abdominal surgery.   His last colonoscopy was 10 years ago.    Review of Systems   Constitutional: Negative for chills, fatigue and fever.   HENT: Negative for trouble swallowing.    Respiratory: Negative for cough, shortness of breath and wheezing.    Cardiovascular: Negative for chest pain and palpitations.   Gastrointestinal: Positive for abdominal pain. Negative for constipation, diarrhea, nausea and vomiting.   Musculoskeletal: Negative for arthralgias and myalgias.   Skin: Negative for color change and rash.   Neurological: Negative for dizziness, weakness and numbness.   Psychiatric/Behavioral: Negative for confusion. The patient is not nervous/anxious.    All other systems reviewed and are negative.      Objective:         Vitals:    04/03/18 1540   BP: 111/63   Pulse: (!) 58   Weight:  "109.7 kg (241 lb 13.5 oz)   Height: 5' 8" (1.727 m)       Physical Exam   Constitutional: He is oriented to person, place, and time. He appears well-developed and well-nourished.   HENT:   Head: Normocephalic and atraumatic.   Mouth/Throat: Oropharynx is clear and moist.   Eyes: Conjunctivae are normal. Pupils are equal, round, and reactive to light. No scleral icterus.   Neck: Normal range of motion. Neck supple. No thyromegaly present.   Cardiovascular: Normal rate and regular rhythm.    No murmur heard.  Pulmonary/Chest: Effort normal and breath sounds normal. He has no wheezes.   Abdominal: Soft. Bowel sounds are normal. He exhibits no distension. There is no tenderness.   Obese   Musculoskeletal: He exhibits no edema.   Lymphadenopathy:     He has no cervical adenopathy.   Neurological: He is alert and oriented to person, place, and time.   Skin: Skin is warm and dry. No rash noted. No erythema.   Psychiatric: He has a normal mood and affect. His behavior is normal.   Vitals reviewed.        Lab Results   Component Value Date    WBC 5.02 11/01/2017    HGB 13.4 (L) 11/01/2017    HCT 40.6 11/01/2017    MCV 90 11/01/2017     11/01/2017       CMP  Sodium   Date Value Ref Range Status   11/01/2017 140 136 - 145 mmol/L Final     Potassium   Date Value Ref Range Status   11/01/2017 4.8 3.5 - 5.1 mmol/L Final     Chloride   Date Value Ref Range Status   11/01/2017 104 95 - 110 mmol/L Final     CO2   Date Value Ref Range Status   11/01/2017 29 23 - 29 mmol/L Final     Glucose   Date Value Ref Range Status   11/01/2017 105 70 - 110 mg/dL Final     BUN, Bld   Date Value Ref Range Status   11/01/2017 18 8 - 23 mg/dL Final     Creatinine   Date Value Ref Range Status   11/01/2017 0.9 0.5 - 1.4 mg/dL Final   04/27/2012 0.8 0.2 - 1.4 mg/dl Final     Calcium   Date Value Ref Range Status   11/01/2017 9.5 8.7 - 10.5 mg/dL Final   04/27/2012 9.4 8.6 - 10.2 mg/dl Final     Total Protein   Date Value Ref Range Status "   11/01/2017 7.4 6.0 - 8.4 g/dL Final     Albumin   Date Value Ref Range Status   11/01/2017 3.6 3.5 - 5.2 g/dL Final     Total Bilirubin   Date Value Ref Range Status   11/01/2017 0.6 0.1 - 1.0 mg/dL Final     Comment:     For infants and newborns, interpretation of results should be based  on gestational age, weight and in agreement with clinical  observations.  Premature Infant recommended reference ranges:  Up to 24 hours.............<8.0 mg/dL  Up to 48 hours............<12.0 mg/dL  3-5 days..................<15.0 mg/dL  6-29 days.................<15.0 mg/dL       Alkaline Phosphatase   Date Value Ref Range Status   11/01/2017 33 (L) 55 - 135 U/L Final   04/27/2012 74 23 - 119 UNIT/L Final     AST   Date Value Ref Range Status   11/01/2017 19 10 - 40 U/L Final   04/27/2012 19 10 - 34 UNIT/L Final     ALT   Date Value Ref Range Status   11/01/2017 23 10 - 44 U/L Final     Anion Gap   Date Value Ref Range Status   11/01/2017 7 (L) 8 - 16 mmol/L Final   04/27/2012 9 5 - 15 meq/L Final     eGFR if    Date Value Ref Range Status   11/01/2017 >60.0 >60 mL/min/1.73 m^2 Final     eGFR if non    Date Value Ref Range Status   11/01/2017 >60.0 >60 mL/min/1.73 m^2 Final     Comment:     Calculation used to obtain the estimated glomerular filtration  rate (eGFR) is the CKD-EPI equation.        Ultrasound was independently visualized and reviewed by me and showed fatty liver.          Assessment:       1. Type 2 diabetes mellitus with complication, without long-term current use of insulin    2. Obesity (BMI 30-39.9)    3. Gastroesophageal reflux disease, esophagitis presence not specified    4. Obstructive sleep apnea on CPAP    5. Screening for colorectal cancer    6. Epigastric pain        Plan:       1.  Schedule EGD to evaluate above  2.  Antireflux precautions including avoidance of late night eating and lying down soon after eating.     3.  Avoid NSAIDs  4.  Colonoscopy for screening  5.   Further recommendations to follow after above.

## 2018-04-03 NOTE — LETTER
April 3, 2018      [facility]on  [address]  [place] [address]           [facility]  [address], [address]  [facility] [address]  Phone: [phone]          Patient: Clive Guzman   MR Number: [id number]   YOB: 1955   Date of Visit: 4/3/2018       Dear Josephine Henriquez:    Thank you for referring Clive Guzman to me for evaluation. Attached you will find relevant portions of my assessment and plan of care.    If you have questions, please do not hesitate to call me. I look forward to following Clive Guzman along with you.    Sincerely,    Mark Wynne MD    Enclosure  CC:  No Recipients    If you would like to receive this communication electronically, please contact [email or web address] or [phone] to request more information on [email or web address] Link access.    For providers and/or their staff who would like to refer a patient to [facility], please contact us through our one-stop-shop provider referral line, [facility], at [phone].    If you feel you have received this communication in error or would no longer like to receive these types of communications, please e-mail [email or web address]

## 2018-04-12 ENCOUNTER — SURGERY (OUTPATIENT)
Age: 63
End: 2018-04-12

## 2018-04-12 ENCOUNTER — HOSPITAL ENCOUNTER (OUTPATIENT)
Facility: HOSPITAL | Age: 63
Discharge: HOME | End: 2018-04-12
Attending: INTERNAL MEDICINE | Admitting: INTERNAL MEDICINE
Payer: COMMERCIAL

## 2018-04-12 ENCOUNTER — ANESTHESIA (OUTPATIENT)
Dept: ENDOSCOPY | Facility: HOSPITAL | Age: 63
End: 2018-04-12
Payer: COMMERCIAL

## 2018-04-12 ENCOUNTER — ANESTHESIA EVENT (OUTPATIENT)
Dept: ENDOSCOPY | Facility: HOSPITAL | Age: 63
End: 2018-04-12
Payer: COMMERCIAL

## 2018-04-12 VITALS
DIASTOLIC BLOOD PRESSURE: 61 MMHG | RESPIRATION RATE: 15 BRPM | TEMPERATURE: 98 F | HEIGHT: 68 IN | SYSTOLIC BLOOD PRESSURE: 129 MMHG | HEART RATE: 57 BPM | BODY MASS INDEX: 36.37 KG/M2 | OXYGEN SATURATION: 96 % | WEIGHT: 240 LBS

## 2018-04-12 DIAGNOSIS — R10.9 ABDOMINAL PAIN: ICD-10-CM

## 2018-04-12 DIAGNOSIS — K31.7 GASTRIC POLYPS: Primary | ICD-10-CM

## 2018-04-12 DIAGNOSIS — K44.9 HIATAL HERNIA: ICD-10-CM

## 2018-04-12 DIAGNOSIS — K29.70 GASTRITIS, PRESENCE OF BLEEDING UNSPECIFIED, UNSPECIFIED CHRONICITY, UNSPECIFIED GASTRITIS TYPE: ICD-10-CM

## 2018-04-12 PROCEDURE — 25000003 PHARM REV CODE 250: Performed by: INTERNAL MEDICINE

## 2018-04-12 PROCEDURE — 43239 EGD BIOPSY SINGLE/MULTIPLE: CPT | Performed by: INTERNAL MEDICINE

## 2018-04-12 PROCEDURE — 43251 EGD REMOVE LESION SNARE: CPT | Mod: ,,, | Performed by: INTERNAL MEDICINE

## 2018-04-12 PROCEDURE — 37000008 HC ANESTHESIA 1ST 15 MINUTES: Performed by: INTERNAL MEDICINE

## 2018-04-12 PROCEDURE — 63600175 PHARM REV CODE 636 W HCPCS: Performed by: NURSE ANESTHETIST, CERTIFIED REGISTERED

## 2018-04-12 PROCEDURE — D9220A PRA ANESTHESIA: Mod: ANES,,, | Performed by: ANESTHESIOLOGY

## 2018-04-12 PROCEDURE — 43239 EGD BIOPSY SINGLE/MULTIPLE: CPT | Mod: 59,,, | Performed by: INTERNAL MEDICINE

## 2018-04-12 PROCEDURE — 88305 TISSUE EXAM BY PATHOLOGIST: CPT | Mod: 26,,,

## 2018-04-12 PROCEDURE — 27201089 HC SNARE, DISP (ANY): Performed by: INTERNAL MEDICINE

## 2018-04-12 PROCEDURE — 27201012 HC FORCEPS, HOT/COLD, DISP: Performed by: INTERNAL MEDICINE

## 2018-04-12 PROCEDURE — 88305 TISSUE EXAM BY PATHOLOGIST: CPT

## 2018-04-12 PROCEDURE — D9220A PRA ANESTHESIA: Mod: CRNA,,, | Performed by: NURSE ANESTHETIST, CERTIFIED REGISTERED

## 2018-04-12 PROCEDURE — 43251 EGD REMOVE LESION SNARE: CPT | Performed by: INTERNAL MEDICINE

## 2018-04-12 RX ORDER — SODIUM CHLORIDE 9 MG/ML
INJECTION, SOLUTION INTRAVENOUS CONTINUOUS
Status: DISCONTINUED | OUTPATIENT
Start: 2018-04-12 | End: 2018-04-12 | Stop reason: HOSPADM

## 2018-04-12 RX ORDER — LIDOCAINE HCL/PF 100 MG/5ML
SYRINGE (ML) INTRAVENOUS
Status: DISCONTINUED | OUTPATIENT
Start: 2018-04-12 | End: 2018-04-12

## 2018-04-12 RX ORDER — PROPOFOL 10 MG/ML
VIAL (ML) INTRAVENOUS
Status: DISCONTINUED | OUTPATIENT
Start: 2018-04-12 | End: 2018-04-12

## 2018-04-12 RX ADMIN — PROPOFOL 50 MG: 10 INJECTION, EMULSION INTRAVENOUS at 08:04

## 2018-04-12 RX ADMIN — PROPOFOL 100 MG: 10 INJECTION, EMULSION INTRAVENOUS at 08:04

## 2018-04-12 RX ADMIN — SODIUM CHLORIDE 1000 ML: 0.9 INJECTION, SOLUTION INTRAVENOUS at 08:04

## 2018-04-12 RX ADMIN — LIDOCAINE HYDROCHLORIDE 100 MG: 20 INJECTION, SOLUTION INTRAVENOUS at 08:04

## 2018-04-12 NOTE — DISCHARGE INSTRUCTIONS
Gastritis (Adult)    Gastritis is inflammation and irritation of the stomach lining. It can be present for a short time (acute) or be long lasting (chronic). Gastritis is often caused by infection with bacteria called H pylori. More than a third of people in the [place] have this bacteria in their bodies. In many cases, H pylori causes no problems or symptoms. In some people, though, the infection irritates the stomach lining and causes gastritis. Other causes of stomach irritation include drinking alcohol or taking pain-relieving medicines called NSAIDs (such as aspirin or ibuprofen).   Symptoms of gastritis can include:  · Abdominal pain or bloating  · Loss of appetite  · Nausea or vomiting  · Vomiting blood or having black stools  · Feeling more tired than usual  An inflamed and irritated stomach lining is more likely to develop a sore called an ulcer. To help prevent this, gastritis should be treated.  Home care  If needed, medicines may be prescribed. If you have H pylori infection, treating it will likely relieve your symptoms. Other changes can help reduce stomach irritation and help it heal.  · If you have been prescribed medicines for H pylori infection, take them as directed. Take all of the medicine until it is finished or your healthcare provider tells you to stop, even if you feel better.  · Your healthcare provider may recommend avoiding NSAIDs. If you take daily aspirin for your heart or other medical reasons, do not stop without talking to your healthcare provider first.  · Avoid drinking alcohol.  · Stop smoking. Smoking can irritate the stomach and delay healing. As much as possible, stay away from second hand smoke.  Follow-up care  Follow up with your healthcare provider, or as advised by our staff. Testing may be needed to check for inflammation or an ulcer.  When to seek medical advice  Call your healthcare provider for any of the following:  · Stomach pain that gets worse or moves to the lower  right abdomen (appendix area)  · Chest pain that appears or gets worse, or spreads to the back, neck, shoulder, or arm  · Frequent vomiting (cant keep down liquids)  · Blood in the stool or vomit (red or black in color)  · Feeling weak or dizzy  · Fever of 100.4ºF (38ºC) or higher, or as directed by your healthcare provider  Date Last Reviewed: 6/22/2015 © 2000-2017 [employer]. [address] [place] [address]. All rights reserved. This information is not intended as a substitute for professional medical care. Always follow your healthcare professional's instructions.        What Is a Hiatal Hernia?    Hiatal hernia is when the area where the stomach and esophagus meet bulges up through the diaphragm into the chest cavity. In some cases, part of the stomach may bulge above the diaphragm. Stomach acid may move up into the esophagus and cause symptoms. The symptoms are often blamed on gastroesophageal reflux disease (GERD). You may only know about the hernia when it shows up on an X-ray taken for other reasons.   What you may feel  The hiatus is a normal hole in the diaphragm. The esophagus passes through this hole and leads to the stomach. In some cases, part of the stomach may bulge above the diaphragm. This bulge is called a hernia. Stomach acid may move up into the esophagus and cause symptoms.  When you eat, the muscle at the hiatus relaxes to allow food to pass into the stomach. It tightens again to keep food and digestive acids in the stomach.  Many people with hiatal hernias have mild symptoms. You may notice the following GERD symptoms:  · Heartburn or other chest discomfort  · A feeling of chest fullness after a meal  · Frequent burping  · Acid taste in the mouth  · Trouble swallowing  Treating symptoms  If you have been diagnosed with hiatal hernia, these suggestions may help improve symptoms:  · Lose excess weight. Extra weight puts pressure on the stomach and esophagus.  · Dont  lie down after eating. Sit up for at least an hour after eating. Lying down after eating can increase symptoms.  · Avoid certain foods and drinks. These include fatty foods, chocolate, coffee, mint, and other foods that cause symptoms for you.  · Dont smoke or drink alcohol. These can worsen symptoms.  · Look at your medicines. Discuss your medicines with your healthcare provider. Many medicines can cause symptoms.  · Consider an antacid medicine. Ask your healthcare provider about over-the-counter and prescription medicines that may help.  · Ask about surgery, if needed. Surgery is a treatment choice for some people. Your healthcare provider can determine if surgery is an option for you.    Date Last Reviewed: 10/1/2016  © 1386-1539 [employer] [address] [place] [address], [place] [address]. All rights reserved. This information is not intended as a substitute for professional medical care. Always follow your healthcare professional's instructions.        Upper GI Endoscopy     During endoscopy, a long, flexible tube is used to view the inside of your upper GI tract.      Upper GI endoscopy allows your healthcare provider to look directly into the beginning of your gastrointestinal (GI) tract. The esophagus, stomach, and duodenum (the first part of the small intestine) make up the upper GI tract.   Before the exam  Follow these and any other instructions you are given before your endoscopy. If you dont follow the healthcare providers instructions carefully, the test may need to be canceled or done over:  · Don't eat or drink anything after midnight the night before your exam. If your exam is in the afternoon, drink only clear liquids in the morning. Don't eat or drink anything for 8 hours before the exam. In some cases, you may be able to take medicines with sips of water until 2 hours before the procedure. Speak with your healthcare provider about this.   · Bring your X-rays and any other test results  you have.  · Because you will be sedated, arrange for an adult to drive you home after the exam.  · Tell your healthcare provider before the exam if you are taking any medicines or have any medical problems.  The procedure  Here is what to expect:  · You will lie on the endoscopy table. Usually patients lie on the left side.  · You will be monitored and given oxygen.  · Your throat may be numbed with a spray or gargle. You are given medicine through an intravenous (IV) line that will help you relax and remain comfortable. You may be awake or asleep during the procedure.  · The healthcare provider will put the endoscope in your mouth and down your esophagus. It is thinner than most pieces of food that you swallow. It will not affect your breathing. The medicine helps keep you from gagging.  · Air is put into your GI tract to expand it. It can make you burp.  · During the procedure, the healthcare provider can take biopsies (tissue samples), remove abnormalities, such as polyps, or treat abnormalities through a variety of devices placed through the endoscope. You will not feel this.   · The endoscope carries images of your upper GI tract to a video screen. If you are awake, you may be able to look at the images.  · After the procedure is done, you will rest for a time. An adult must drive you home.  When to call your healthcare provider  Contact your healthcare provider if you have:  · Black or tarry stools, or blood in your stool  · Fever  · Pain in your belly that does not go away  · Nausea and vomiting, or vomiting blood   Date Last Reviewed: 7/1/2016 © 2000-2017 [employer]. [address] [place] [address]. All rights reserved. This information is not intended as a substitute for professional medical care. Always follow your healthcare professional's instructions.      Discharge Instructions: After Your Surgery/Procedure  Youve just had surgery. During surgery you were given medicine  "called anesthesia to keep you relaxed and free of pain. After surgery you may have some pain or nausea. This is common. Here are some tips for feeling better and getting well after surgery.     Stay on schedule with your medication.   Going home  Your doctor or nurse will show you how to take care of yourself when you go home. He or she will also answer your questions. Have an adult family member or friend drive you home.      For your safety we recommend these precaution for the first 24 hours after your procedure:  · Do not drive or use heavy equipment.  · Do not make important decisions or sign legal papers.  · Do not drink alcohol.  · Have someone stay with you, if needed. He or she can watch for problems and help keep you safe.  · Your concentration, balance, coordination, and judgement may be impaired for many hours after anesthesia.  Use caution when ambulating or standing up.     · You may feel weak and "washed out" after anesthesia and surgery.      Subtle residual effects of general anesthesia or sedation with regional / local anesthesia can last more than 24 hours.  Rest for the remainder of the day or longer if your Doctor/Surgeon has advised you to do so.  Although you may feel normal within the first 24 hours, your reflexes and mental ability may be impaired without you realizing it.  You may feel dizzy, lightheaded or sleepy for 24 hours or longer.      Be sure to go to all follow-up visits with your doctor. And rest after your surgery for as long as your doctor tells you to.  Coping with pain  If you have pain after surgery, pain medicine will help you feel better. Take it as told, before pain becomes severe. Also, ask your doctor or pharmacist about other ways to control pain. This might be with heat, ice, or relaxation. And follow any other instructions your surgeon or nurse gives you.  Tips for taking pain medicine  To get the best relief possible, remember these points:  · Pain medicines can " upset your stomach. Taking them with a little food may help.  · Most pain relievers taken by mouth need at least 20 to 30 minutes to start to work.  · Taking medicine on a schedule can help you remember to take it. Try to time your medicine so that you can take it before starting an activity. This might be before you get dressed, go for a walk, or sit down for dinner.  · Constipation is a common side effect of pain medicines. Call your doctor before taking any medicines such as laxatives or stool softeners to help ease constipation. Also ask if you should skip any foods. Drinking lots of fluids and eating foods such as fruits and vegetables that are high in fiber can also help. Remember, do not take laxatives unless your surgeon has prescribed them.  · Drinking alcohol and taking pain medicine can cause dizziness and slow your breathing. It can even be deadly. Do not drink alcohol while taking pain medicine.  · Pain medicine can make you react more slowly to things. Do not drive or run machinery while taking pain medicine.  Your health care provider may tell you to take acetaminophen to help ease your pain. Ask him or her how much you are supposed to take each day. Acetaminophen or other pain relievers may interact with your prescription medicines or other over-the-counter (OTC) drugs. Some prescription medicines have acetaminophen and other ingredients. Using both prescription and OTC acetaminophen for pain can cause you to overdose. Read the labels on your OTC medicines with care. This will help you to clearly know the list of ingredients, how much to take, and any warnings. It may also help you not take too much acetaminophen. If you have questions or do not understand the information, ask your pharmacist or health care provider to explain it to you before you take the OTC medicine.  Managing nausea  Some people have an upset stomach after surgery. This is often because of anesthesia, pain, or pain medicine, or  the stress of surgery. These tips will help you handle nausea and eat healthy foods as you get better. If you were on a special food plan before surgery, ask your doctor if you should follow it while you get better. These tips may help:  · Do not push yourself to eat. Your body will tell you when to eat and how much.  · Start off with clear liquids and soup. They are easier to digest.  · Next try semi-solid foods, such as mashed potatoes, applesauce, and gelatin, as you feel ready.  · Slowly move to solid foods. Dont eat fatty, rich, or spicy foods at first.  · Do not force yourself to have 3 large meals a day. Instead eat smaller amounts more often.  · Take pain medicines with a small amount of solid food, such as crackers or toast, to avoid nausea.     Call your surgeon if  · You still have pain an hour after taking medicine. The medicine may not be strong enough.  · You feel too sleepy, dizzy, or groggy. The medicine may be too strong.  · You have side effects like nausea, vomiting, or skin changes, such as rash, itching, or hives.       If you have obstructive sleep apnea  You were given anesthesia medicine during surgery to keep you comfortable and free of pain. After surgery, you may have more apnea spells because of this medicine and other medicines you were given. The spells may last longer than usual.   At home:  · Keep using the continuous positive airway pressure (CPAP) device when you sleep. Unless your health care provider tells you not to, use it when you sleep, day or night. CPAP is a common device used to treat obstructive sleep apnea.  · Talk with your provider before taking any pain medicine, muscle relaxants, or sedatives. Your provider will tell you about the possible dangers of taking these medicines.  © 3662-6444 [employer]. [address] [place] [address], [place] [address]. All rights reserved. This information is not intended as a substitute for professional medical care. Always  follow your healthcare professional's instructions.

## 2018-04-12 NOTE — PROVATION PATIENT INSTRUCTIONS
Discharge Summary/Instructions after an Endoscopic Procedure  Patient Name: Clive Guzman  Patient MRN: [id number]  Patient YOB: 1955 Thursday, April 12, 2018  Mark Martinez MD  RESTRICTIONS:  During your procedure today, you received medications for sedation.  These   medications may affect your judgment, balance and coordination.  Therefore,   for 24 hours, you have the following restrictions:   - DO NOT drive a car, operate machinery, make legal/financial decisions,   sign important papers or drink alcohol.    ACTIVITY:  The following day: return to full activity including work, except no heavy   lifting, straining or running for 3 days if polyps were removed.  DIET:  Eat and drink normally unless instructed otherwise.     TREATMENT FOR COMMON SIDE EFFECTS:  - Mild abdominal pain, nausea, belching, bloating or excessive gas:  rest,   eat lightly and use a heating pad.  - Sore Throat: treat with throat lozenges and/or gargle with warm salt   water.  - Because air was used during the procedure, expelling large amounts of air   from your rectum or belching is normal.  - If a bowel prep was taken, you may not have a bowel movement for 1-3 days.    This is normal.  SYMPTOMS TO WATCH FOR AND REPORT TO YOUR PHYSICIAN:  1. Abdominal pain or bloating, other than gas cramps.  2. Chest pain.  3. Back pain.  4. Signs of infection such as: chills or fever occurring within 24 hours   after the procedure.  5. Rectal bleeding, which would show as bright red, maroon, or black stools.   (A tablespoon of blood from the rectum is not serious, especially if   hemorrhoids are present.)  6. Vomiting.  7. Weakness or dizziness.  GO DIRECTLY TO THE NEAREST EMERGENCY ROOM IF YOU HAVE ANY OF THE FOLLOWING:      Difficulty breathing              Chills and/or fever over 101 F   Persistent vomiting and/or vomiting blood   Severe abdominal pain   Severe chest pain   Black, tarry stools   Bleeding- more than one  tablespoon   Any other symptom or condition that you feel may need urgent attention  Your doctor recommends these additional instructions:  If any biopsies were taken, your doctors clinic will contact you in 1 to 2   weeks with any results.  - Patient has a contact number available for emergencies.  The signs and   symptoms of potential delayed complications were discussed with the   patient.  Return to normal activities tomorrow.  Written discharge   instructions were provided to the patient.   - Resume previous diet.   - Continue present medications.   - Resume aspirin at prior dose today.   - No aspirin, ibuprofen, naproxen, or other non-steroidal anti-inflammatory   drugs.   - Await pathology results.   - Discharge patient to home (ambulatory).   - Follow an antireflux regimen.   - Return to my office PRN.  For questions, problems or results please call your physician - Mark Martinez MD at Work:  [phone].  [facility], EMERGENCY ROOM PHONE NUMBER: [phone]  IF A COMPLICATION OR EMERGENCY SITUATION ARISES AND YOU ARE UNABLE TO REACH   YOUR PHYSICIAN - GO DIRECTLY TO THE EMERGENCY ROOM.  Mark Martinez MD  4/12/2018 8:47:29 AM  This report has been verified and signed electronically.

## 2018-04-12 NOTE — ANESTHESIA POSTPROCEDURE EVALUATION
"Anesthesia Post Evaluation    Patient: Clive Guzman    Procedure(s) Performed: Procedure(s) (LRB):  ESOPHAGOGASTRODUODENOSCOPY (EGD) (N/A)    Final Anesthesia Type: general  Patient location during evaluation: PACU  Patient participation: Yes- Able to Participate  Level of consciousness: awake and alert  Post-procedure vital signs: reviewed and stable  Pain management: adequate  Airway patency: patent  PONV status at discharge: No PONV  Anesthetic complications: no      Cardiovascular status: blood pressure returned to baseline  Respiratory status: unassisted  Hydration status: euvolemic  Follow-up not needed.        Visit Vitals  /61 (BP Location: Left arm, Patient Position: Lying)   Pulse (!) 57   Temp 36.4 °C (97.6 °F)   Resp 15   Ht 5' 8" (1.727 m)   Wt 108.9 kg (240 lb)   SpO2 96%   BMI 36.49 kg/m²       Pain/Olga Score: Pain Assessment Performed: Yes (4/12/2018  9:07 AM)  Presence of Pain: denies (4/12/2018  9:07 AM)  Pain Rating Prior to Med Admin: 1 (4/12/2018  9:07 AM)  Pain Rating Post Med Admin: 1 (4/12/2018  9:07 AM)  Olga Score: 10 (4/12/2018  9:06 AM)      "

## 2018-04-12 NOTE — TRANSFER OF CARE
"Anesthesia Transfer of Care Note    Patient: Clive Guzman    Procedure(s) Performed: Procedure(s) (LRB):  ESOPHAGOGASTRODUODENOSCOPY (EGD) (N/A)    Patient location: PACU    Anesthesia Type: general    Transport from OR: Transported from OR on room air with adequate spontaneous ventilation    Post pain: adequate analgesia    Post assessment: no apparent anesthetic complications    Post vital signs: stable    Level of consciousness: awake    Nausea/Vomiting: no nausea/vomiting    Complications: none    Transfer of care protocol was followed      Last vitals:   Visit Vitals  /67 (BP Location: Left arm, Patient Position: Lying)   Pulse (!) 54   Temp 36.8 °C (98.3 °F) (Oral)   Resp 16   Ht 5' 8" (1.727 m)   Wt 108.9 kg (240 lb)   SpO2 97%   BMI 36.49 kg/m²     "

## 2018-04-12 NOTE — ANESTHESIA PREPROCEDURE EVALUATION
04/12/2018  Clive Guzman is a 62 y.o., male.    Anesthesia Evaluation    I have reviewed the Patient Summary Reports.    I have reviewed the Nursing Notes.   I have reviewed the Medications.     Review of Systems  Anesthesia Hx:  Denies Family Hx of Anesthesia complications.   Denies Personal Hx of Anesthesia complications.   Pulmonary:   Sleep Apnea    Hepatic/GI:   GERD Liver Disease,    Neurological:   Headaches    Endocrine:   Diabetes, type 2        Physical Exam  General:  Obesity    Airway/Jaw/Neck:  Airway Findings: Mouth Opening: Normal Tongue: Normal  General Airway Assessment: Adult  Mallampati: III  Improves to II with phonation.  TM Distance: Normal, at least 6 cm  Jaw/Neck Findings:  Neck ROM: Normal ROM  Neck Findings:  Girth Increased      Dental:  DENTAL FINDINGS: Normal   Chest/Lungs:  Chest/Lungs Clear    Heart/Vascular:  Heart Findings: Normal            Anesthesia Plan  Type of Anesthesia, risks & benefits discussed:  Anesthesia Type:  general  Patient's Preference:   Intra-op Monitoring Plan: standard ASA monitors  Intra-op Monitoring Plan Comments:   Post Op Pain Control Plan:   Post Op Pain Control Plan Comments:   Induction:   IV  Beta Blocker:  Patient is on a Beta-Blocker and has received one dose within the past 24 hours (No further documentation required).       Informed Consent: Patient understands risks and agrees with Anesthesia plan.  Questions answered. Anesthesia consent signed with patient.  ASA Score: 3     Day of Surgery Review of History & Physical:    H&P update referred to the provider.         Ready For Surgery From Anesthesia Perspective.

## 2018-04-18 ENCOUNTER — PATIENT MESSAGE (OUTPATIENT)
Dept: GASTROENTEROLOGY | Facility: CLINIC | Age: 63
End: 2018-04-18

## 2018-04-19 ENCOUNTER — HOSPITAL ENCOUNTER (OUTPATIENT)
Facility: HOSPITAL | Age: 63
Discharge: HOME | End: 2018-04-19
Attending: INTERNAL MEDICINE | Admitting: INTERNAL MEDICINE
Payer: COMMERCIAL

## 2018-04-19 ENCOUNTER — ANESTHESIA EVENT (OUTPATIENT)
Dept: ENDOSCOPY | Facility: HOSPITAL | Age: 63
End: 2018-04-19
Payer: COMMERCIAL

## 2018-04-19 ENCOUNTER — SURGERY (OUTPATIENT)
Age: 63
End: 2018-04-19

## 2018-04-19 ENCOUNTER — ANESTHESIA (OUTPATIENT)
Dept: ENDOSCOPY | Facility: HOSPITAL | Age: 63
End: 2018-04-19
Payer: COMMERCIAL

## 2018-04-19 VITALS
DIASTOLIC BLOOD PRESSURE: 66 MMHG | HEART RATE: 52 BPM | SYSTOLIC BLOOD PRESSURE: 136 MMHG | TEMPERATURE: 98 F | OXYGEN SATURATION: 96 % | RESPIRATION RATE: 20 BRPM

## 2018-04-19 DIAGNOSIS — Z12.11 SCREEN FOR COLON CANCER: ICD-10-CM

## 2018-04-19 DIAGNOSIS — K64.8 INTERNAL HEMORRHOIDS: Primary | ICD-10-CM

## 2018-04-19 DIAGNOSIS — K57.30 DIVERTICULOSIS OF LARGE INTESTINE WITHOUT HEMORRHAGE: ICD-10-CM

## 2018-04-19 PROCEDURE — D9220A PRA ANESTHESIA: Mod: ANES,,, | Performed by: ANESTHESIOLOGY

## 2018-04-19 PROCEDURE — G0121 COLON CA SCRN NOT HI RSK IND: HCPCS | Performed by: INTERNAL MEDICINE

## 2018-04-19 PROCEDURE — 37000008 HC ANESTHESIA 1ST 15 MINUTES: Performed by: INTERNAL MEDICINE

## 2018-04-19 PROCEDURE — D9220A PRA ANESTHESIA: Mod: CRNA,,, | Performed by: NURSE ANESTHETIST, CERTIFIED REGISTERED

## 2018-04-19 PROCEDURE — G0121 COLON CA SCRN NOT HI RSK IND: HCPCS | Mod: ,,, | Performed by: INTERNAL MEDICINE

## 2018-04-19 PROCEDURE — 63600175 PHARM REV CODE 636 W HCPCS: Performed by: NURSE ANESTHETIST, CERTIFIED REGISTERED

## 2018-04-19 PROCEDURE — 25000003 PHARM REV CODE 250: Performed by: INTERNAL MEDICINE

## 2018-04-19 PROCEDURE — 37000009 HC ANESTHESIA EA ADD 15 MINS: Performed by: INTERNAL MEDICINE

## 2018-04-19 RX ORDER — SODIUM CHLORIDE 9 MG/ML
INJECTION, SOLUTION INTRAVENOUS CONTINUOUS
Status: DISCONTINUED | OUTPATIENT
Start: 2018-04-19 | End: 2018-04-19 | Stop reason: HOSPADM

## 2018-04-19 RX ORDER — PROPOFOL 10 MG/ML
VIAL (ML) INTRAVENOUS
Status: DISCONTINUED | OUTPATIENT
Start: 2018-04-19 | End: 2018-04-19

## 2018-04-19 RX ADMIN — SODIUM CHLORIDE: 0.9 INJECTION, SOLUTION INTRAVENOUS at 09:04

## 2018-04-19 RX ADMIN — PROPOFOL 40 MG: 10 INJECTION, EMULSION INTRAVENOUS at 10:04

## 2018-04-19 RX ADMIN — PROPOFOL 40 MG: 10 INJECTION, EMULSION INTRAVENOUS at 09:04

## 2018-04-19 RX ADMIN — PROPOFOL 120 MG: 10 INJECTION, EMULSION INTRAVENOUS at 09:04

## 2018-04-19 NOTE — TRANSFER OF CARE
Anesthesia Transfer of Care Note    Patient: Clive Guzman    Procedure(s) Performed: Procedure(s) (LRB):  COLONOSCOPY (N/A)    Patient location: GI    Anesthesia Type: general    Transport from OR: Transported from OR on 2-3 L/min O2 by NC with adequate spontaneous ventilation    Post pain: adequate analgesia    Post assessment: no apparent anesthetic complications    Post vital signs: stable    Level of consciousness: awake    Nausea/Vomiting: no nausea/vomiting    Complications: none    Transfer of care protocol was followed      Last vitals:   Visit Vitals  /66   Pulse (!) 57   Temp 36.8 °C (98.3 °F) (Oral)   Resp 17   SpO2 99%

## 2018-04-19 NOTE — ANESTHESIA POSTPROCEDURE EVALUATION
Anesthesia Post Evaluation    Patient: Clive Guzman    Procedure(s) Performed: Procedure(s) (LRB):  COLONOSCOPY (N/A)    Final Anesthesia Type: general  Patient location during evaluation: PACU  Patient participation: Yes- Able to Participate  Level of consciousness: awake and alert  Post-procedure vital signs: reviewed and stable  Pain management: adequate  Airway patency: patent  PONV status at discharge: No PONV  Anesthetic complications: no      Cardiovascular status: hemodynamically stable  Respiratory status: unassisted and room air  Hydration status: euvolemic  Follow-up not needed.        Visit Vitals  /66   Pulse (!) 52   Temp 36.8 °C (98.3 °F) (Oral)   Resp 20   SpO2 96%       Pain/Olga Score: Pain Assessment Performed: Yes (4/19/2018 10:31 AM)  Presence of Pain: denies (4/19/2018 10:31 AM)  Olga Score: 10 (4/19/2018 10:31 AM)

## 2018-04-19 NOTE — PLAN OF CARE
VSS in NAD, pt c/o migraine headache pain of 4/10. Pt resting quietly while awaiting procedure. Care plan reviewed and pt oriented to perioperative routine, pt verbalized understanding

## 2018-04-19 NOTE — PROVATION PATIENT INSTRUCTIONS
Discharge Summary/Instructions after an Endoscopic Procedure  Patient Name: Clive Guzman  Patient MRN: [id number]  Patient YOB: 1955 Thursday, April 19, 2018  Mark Martinez MD  RESTRICTIONS:  During your procedure today, you received medications for sedation.  These   medications may affect your judgment, balance and coordination.  Therefore,   for 24 hours, you have the following restrictions:   - DO NOT drive a car, operate machinery, make legal/financial decisions,   sign important papers or drink alcohol.    ACTIVITY:  The following day: return to full activity including work, except no heavy   lifting, straining or running for 3 days if polyps were removed.  DIET:  Eat and drink normally unless instructed otherwise.     TREATMENT FOR COMMON SIDE EFFECTS:  - Mild abdominal pain, nausea, belching, bloating or excessive gas:  rest,   eat lightly and use a heating pad.  - Sore Throat: treat with throat lozenges and/or gargle with warm salt   water.  - Because air was used during the procedure, expelling large amounts of air   from your rectum or belching is normal.  - If a bowel prep was taken, you may not have a bowel movement for 1-3 days.    This is normal.  SYMPTOMS TO WATCH FOR AND REPORT TO YOUR PHYSICIAN:  1. Abdominal pain or bloating, other than gas cramps.  2. Chest pain.  3. Back pain.  4. Signs of infection such as: chills or fever occurring within 24 hours   after the procedure.  5. Rectal bleeding, which would show as bright red, maroon, or black stools.   (A tablespoon of blood from the rectum is not serious, especially if   hemorrhoids are present.)  6. Vomiting.  7. Weakness or dizziness.  GO DIRECTLY TO THE NEAREST EMERGENCY ROOM IF YOU HAVE ANY OF THE FOLLOWING:      Difficulty breathing              Chills and/or fever over 101 F   Persistent vomiting and/or vomiting blood   Severe abdominal pain   Severe chest pain   Black, tarry stools   Bleeding- more than one  tablespoon   Any other symptom or condition that you feel may need urgent attention  Your doctor recommends these additional instructions:  If any biopsies were taken, your doctors clinic will contact you in 1 to 2   weeks with any results.  - Patient has a contact number available for emergencies.  The signs and   symptoms of potential delayed complications were discussed with the   patient.  Return to normal activities tomorrow.  Written discharge   instructions were provided to the patient.   - High fiber diet.   - Continue present medications.   - No aspirin, ibuprofen, naproxen, or other non-steroidal anti-inflammatory   drugs.   - Repeat colonoscopy in 10 years for screening purposes.   - Discharge patient to home (ambulatory).   - Return to my office PRN.  For questions, problems or results please call your physician - Mark Martinez MD at Work:  [phone].  [facility], EMERGENCY ROOM PHONE NUMBER: [phone]  IF A COMPLICATION OR EMERGENCY SITUATION ARISES AND YOU ARE UNABLE TO REACH   YOUR PHYSICIAN - GO DIRECTLY TO THE EMERGENCY ROOM.  Mark Martinez MD  4/19/2018 10:08:59 AM  This report has been verified and signed electronically.

## 2018-04-19 NOTE — ANESTHESIA PREPROCEDURE EVALUATION
04/19/2018  Clive Guzman is a 62 y.o., male.    Pre-op Assessment    I have reviewed the Patient Summary Reports.     I have reviewed the Nursing Notes.   I have reviewed the Medications.     Review of Systems  Anesthesia Hx:  No problems with previous Anesthesia  Denies Family Hx of Anesthesia complications.   Denies Personal Hx of Anesthesia complications.   Social:  Non-Smoker    Hematology/Oncology:  Hematology Normal   Oncology Normal     EENT/Dental:EENT/Dental Normal   Cardiovascular:   hyperlipidemia    Pulmonary:   Sleep Apnea, CPAP    Hepatic/GI:   Bowel Prep. GERD Liver Disease,    Musculoskeletal:  Musculoskeletal Normal    Neurological:   Headaches    Endocrine:   Diabetes, type 2    Dermatological:  Skin Normal    Psych:  Psychiatric Normal           Physical Exam  General:  Obesity    Airway/Jaw/Neck:  Airway Findings: Mouth Opening: Normal Tongue: Normal  General Airway Assessment: Adult  Mallampati: III  Improves to II with phonation.  TM Distance: Normal, at least 6 cm  Jaw/Neck Findings:  Neck ROM: Normal ROM  Neck Findings:  Girth Increased     Eyes/Ears/Nose:  EYES/EARS/NOSE FINDINGS: Normal   Dental:  DENTAL FINDINGS: Normal   Chest/Lungs:  Chest/Lungs Findings: Clear to auscultation, Normal Respiratory Rate     Heart/Vascular:  Heart Findings: Rate: Normal  Rhythm: Regular Rhythm  Sounds: Normal        Mental Status:  Mental Status Findings:  Cooperative, Alert and Oriented         Anesthesia Plan  Type of Anesthesia, risks & benefits discussed:  Anesthesia Type:  general  Patient's Preference:   Intra-op Monitoring Plan: standard ASA monitors  Intra-op Monitoring Plan Comments:   Post Op Pain Control Plan:   Post Op Pain Control Plan Comments:   Induction:   IV  Beta Blocker:  Patient is on a Beta-Blocker and has received one dose within the past 24 hours (No further  documentation required).       Informed Consent: Patient understands risks and agrees with Anesthesia plan.  Questions answered. Anesthesia consent signed with patient.  ASA Score: 3     Day of Surgery Review of History & Physical: I have interviewed and examined the patient. I have reviewed the patient's H&P dated:  There are no significant changes.  H&P update referred to the provider.         Ready For Surgery From Anesthesia Perspective.

## 2018-04-19 NOTE — DISCHARGE INSTRUCTIONS
"Discharge Instructions: After Your Surgery/Procedure  Youve just had surgery. During surgery you were given medicine called anesthesia to keep you relaxed and free of pain. After surgery you may have some pain or nausea. This is common. Here are some tips for feeling better and getting well after surgery.     Stay on schedule with your medication.   Going home  Your doctor or nurse will show you how to take care of yourself when you go home. He or she will also answer your questions. Have an adult family member or friend drive you home.      For your safety we recommend these precaution for the first 24 hours after your procedure:  · Do not drive or use heavy equipment.  · Do not make important decisions or sign legal papers.  · Do not drink alcohol.  · Have someone stay with you, if needed. He or she can watch for problems and help keep you safe.  · Your concentration, balance, coordination, and judgement may be impaired for many hours after anesthesia.  Use caution when ambulating or standing up.     · You may feel weak and "washed out" after anesthesia and surgery.      Subtle residual effects of general anesthesia or sedation with regional / local anesthesia can last more than 24 hours.  Rest for the remainder of the day or longer if your Doctor/Surgeon has advised you to do so.  Although you may feel normal within the first 24 hours, your reflexes and mental ability may be impaired without you realizing it.  You may feel dizzy, lightheaded or sleepy for 24 hours or longer.      Be sure to go to all follow-up visits with your doctor. And rest after your surgery for as long as your doctor tells you to.  Coping with pain  If you have pain after surgery, pain medicine will help you feel better. Take it as told, before pain becomes severe. Also, ask your doctor or pharmacist about other ways to control pain. This might be with heat, ice, or relaxation. And follow any other instructions your surgeon or nurse gives " you.  Tips for taking pain medicine  To get the best relief possible, remember these points:  · Pain medicines can upset your stomach. Taking them with a little food may help.  · Most pain relievers taken by mouth need at least 20 to 30 minutes to start to work.  · Taking medicine on a schedule can help you remember to take it. Try to time your medicine so that you can take it before starting an activity. This might be before you get dressed, go for a walk, or sit down for dinner.  · Constipation is a common side effect of pain medicines. Call your doctor before taking any medicines such as laxatives or stool softeners to help ease constipation. Also ask if you should skip any foods. Drinking lots of fluids and eating foods such as fruits and vegetables that are high in fiber can also help. Remember, do not take laxatives unless your surgeon has prescribed them.  · Drinking alcohol and taking pain medicine can cause dizziness and slow your breathing. It can even be deadly. Do not drink alcohol while taking pain medicine.  · Pain medicine can make you react more slowly to things. Do not drive or run machinery while taking pain medicine.  Your health care provider may tell you to take acetaminophen to help ease your pain. Ask him or her how much you are supposed to take each day. Acetaminophen or other pain relievers may interact with your prescription medicines or other over-the-counter (OTC) drugs. Some prescription medicines have acetaminophen and other ingredients. Using both prescription and OTC acetaminophen for pain can cause you to overdose. Read the labels on your OTC medicines with care. This will help you to clearly know the list of ingredients, how much to take, and any warnings. It may also help you not take too much acetaminophen. If you have questions or do not understand the information, ask your pharmacist or health care provider to explain it to you before you take the OTC medicine.  Managing  nausea  Some people have an upset stomach after surgery. This is often because of anesthesia, pain, or pain medicine, or the stress of surgery. These tips will help you handle nausea and eat healthy foods as you get better. If you were on a special food plan before surgery, ask your doctor if you should follow it while you get better. These tips may help:  · Do not push yourself to eat. Your body will tell you when to eat and how much.  · Start off with clear liquids and soup. They are easier to digest.  · Next try semi-solid foods, such as mashed potatoes, applesauce, and gelatin, as you feel ready.  · Slowly move to solid foods. Dont eat fatty, rich, or spicy foods at first.  · Do not force yourself to have 3 large meals a day. Instead eat smaller amounts more often.  · Take pain medicines with a small amount of solid food, such as crackers or toast, to avoid nausea.     Call your surgeon if  · You still have pain an hour after taking medicine. The medicine may not be strong enough.  · You feel too sleepy, dizzy, or groggy. The medicine may be too strong.  · You have side effects like nausea, vomiting, or skin changes, such as rash, itching, or hives.       If you have obstructive sleep apnea  You were given anesthesia medicine during surgery to keep you comfortable and free of pain. After surgery, you may have more apnea spells because of this medicine and other medicines you were given. The spells may last longer than usual.   At home:  · Keep using the continuous positive airway pressure (CPAP) device when you sleep. Unless your health care provider tells you not to, use it when you sleep, day or night. CPAP is a common device used to treat obstructive sleep apnea.  · Talk with your provider before taking any pain medicine, muscle relaxants, or sedatives. Your provider will tell you about the possible dangers of taking these medicines.  © 6841-1345 [employer]. [address] [place] [address]  [place] [address]. All rights reserved. This information is not intended as a substitute for professional medical care. Always follow your healthcare professional's instructions.      Diverticulosis    Diverticulosis means that small pouches have formed in the wall of your large intestine (colon). Most often, this problem causes no symptoms and is common as people age. But the pouches in the colon are at risk of becoming infected. When this happens, the condition is called diverticulitis. Although most people with diverticulosis never develop diverticulitis, it is still not uncommon. Rectal bleeding can also occur and in less common situations, a type of colon inflammation called colitis.  While most people do not have symptoms, some people with diverticulosis may have:  · Abdominal cramps and pain  · Bloating  · Constipation  · Change in bowel habits  Causes  The exact cause of diverticulosis (and diverticulitis) has not been proved, but a few things are associated with the condition:  · Low-fiber diet  · Constipation  · Lack of exercise  Your healthcare provider will talk with you about how to manage your condition. Diet changes may be all that are needed to help control diverticulosis and prevent progression to diverticulitis. If you develop diverticulitis, you will likely need other treatments.  Home care  You may be told to take fiber supplements daily. Fiber adds bulk to the stool so that it passes through the colon more easily. Stool softeners may be recommended. You may also be given medications for pain relief. Be sure to take all medications as directed.  In the past, people were told to avoid corn, nuts, and seeds. This is no longer necessary.  Follow these guidelines when caring for yourself at home:  · Eat unprocessed foods that are high in fiber. Whole grains, fruits, and vegetables are good choices.  · Drink 6 to 8 glasses of water every day unless your healthcare provider has you limit how much fluid you  should have.  · Watch for changes in your bowel movements. Tell your provider if you notice any changes.  · Begin an exercise program. Ask your provider how to get started. Generally, walking is the best.  · Get plenty of rest and sleep.  Follow-up care  Follow up with your healthcare provider, or as advised. Regular visits may be needed to check on your health. Sometimes special procedures such as colonoscopy, are needed after an episode of diverticulitis or blooding. Be sure to keep all your appointments.  If a stool sample was taken, or cultures were done, you should be told if they are positive, or if your treatment needs to be changed. You can call as directed for the results.  If X-rays were done, a radiologist will look at them. You will be told if there is a change in your treatment.  If antibiotics were prescribed, be sure to finish them all.  When to seek medical advice  Call your healthcare provider right away if any of these occur:  · Fever of 100.4°F (38°C) or higher, or as directed by your healthcare provider  · Severe cramps in the lower left side of the abdomen or pain that is getting worse  · Tenderness in the lower left side of the abdomen or worsening pain throughout the abdomen  · Diarrhea or constipation that doesn't get better within 24 hours  · Nausea and vomiting  · Bleeding from the rectum  Call 911  Call emergency services if any of the following occur:  · Trouble breathing  · Confusion  · Very drowsy or trouble awakening  · Fainting or loss of consciousness  · Rapid heart rate  · Chest pain  Date Last Reviewed: 12/30/2015  © 0333-7306 [employer]. [address] [place] [address], [place] [address]. All rights reserved. This information is not intended as a substitute for professional medical care. Always follow your healthcare professional's instructions.        Hemorrhoids    Hemorrhoids are swollen and inflamed veins inside the rectum and near the anus. The rectum is the last  several inches of the colon. The anus is the passage between the rectum and the outside of the body.  Causes  The veins can become swollen due to increased pressure in them. This is most often caused by:  · Chronic constipation or diarrhea  · Straining when having a bowel movement  · Sitting too long on the toilet  · A low-fiber diet  · Pregnancy  Symptoms  · Bleeding from the rectum (this may be noticeable after bowel movements)  · Lump near the anus  · Itching around the anus  · Pain around the anus  There are different types of hemorrhoids. Depending on the type you have and the severity, you may be able to treat yourself at home. In some cases, a procedure may be the best treatment option. Your healthcare provider can tell you more about this, if needed.  Home care  General care  · To get relief from pain or itching, try:  ¨ Topical products. Your healthcare provider may prescribe or recommend creams, ointments, or pads that can be applied to the hemorrhoid. Use these exactly as directed.  ¨ Medicines. Your healthcare provider may recommend stool softeners, suppositories, or laxatives to help manage constipation. Use these exactly as directed.  ¨ Sitz baths. A sitz bath involves sitting in a few inches of warm bath water. Be careful not to make the water so hot that you burn yourself--test it before sitting in it. Soak for about 10 to 15 minutes a few times a day. This may help relieve pain.  Tips to help prevent hemorrhoids  · Eat more fiber. Fiber adds bulk to stool and absorbs water as it moves through your colon. This makes stool softer and easier to pass.  ¨ Increase the fiber in your diet with more fiber-rich foods. These include fresh fruit, vegetables, and whole grains.  ¨ Take a fiber supplement or bulking agent, if advised to by your provider. These include products such as psyllium or methylcellulose.  · Drink plenty of water, if directed to by your provider. This can help keep stool soft.  · Be more  active. Frequent exercise aids digestion and helps prevent constipation. It may also help make bowel movements more regular.  · Dont strain during bowel movements. This can make hemorrhoids more likely. Also, dont sit on the toilet for long periods of time.  Follow-up care  Follow up with your healthcare provider, or as advised. If a culture or imaging tests were done, you will be notified of the results when they are ready. This may take a few days or longer.  When to seek medical advice  Call your healthcare provider right away if any of these occur:  · Increased bleeding from the rectum  · Increased pain around the rectum or anus  · Weakness or dizziness  Call 911  Call 911 or return to the emergency department right away if any of these occur:  · Trouble breathing or swallowing  · Fainting or loss of consciousness  · Unusually fast heart rate  · Vomiting blood  · Large amounts of blood in stool  Date Last Reviewed: 6/22/2015  © 8551-3753 [employer]. [address] [place] [address], [place] [address]. All rights reserved. This information is not intended as a substitute for professional medical care. Always follow your healthcare professional's instructions.

## 2018-04-19 NOTE — PLAN OF CARE
Dr Wynne to bedside to review findings with pt and wife. Both verballized understanding.  Written and verbal discharge instructions given to both; both received and verbalized understanding.  VSS, denies pain, passing flatus, pt meets all dc crtiteria

## 2018-06-05 ENCOUNTER — OFFICE VISIT (OUTPATIENT)
Dept: FAMILY MEDICINE | Facility: CLINIC | Age: 63
End: 2018-06-05
Payer: COMMERCIAL

## 2018-06-05 ENCOUNTER — HOSPITAL ENCOUNTER (OUTPATIENT)
Dept: RADIOLOGY | Facility: HOSPITAL | Age: 63
Discharge: HOME | End: 2018-06-05
Attending: FAMILY MEDICINE
Payer: COMMERCIAL

## 2018-06-05 VITALS
HEIGHT: 68 IN | WEIGHT: 243.63 LBS | DIASTOLIC BLOOD PRESSURE: 63 MMHG | HEART RATE: 60 BPM | BODY MASS INDEX: 36.92 KG/M2 | TEMPERATURE: 98 F | SYSTOLIC BLOOD PRESSURE: 108 MMHG

## 2018-06-05 DIAGNOSIS — K76.0 NAFLD (NONALCOHOLIC FATTY LIVER DISEASE): ICD-10-CM

## 2018-06-05 DIAGNOSIS — E55.9 VITAMIN D DEFICIENCY: ICD-10-CM

## 2018-06-05 DIAGNOSIS — S09.90XA TRAUMATIC INJURY OF HEAD, INITIAL ENCOUNTER: ICD-10-CM

## 2018-06-05 DIAGNOSIS — L57.0 ACTINIC KERATOSIS: ICD-10-CM

## 2018-06-05 DIAGNOSIS — H93.8X2 EAR MASS, LEFT: ICD-10-CM

## 2018-06-05 DIAGNOSIS — S09.90XA TRAUMATIC INJURY OF HEAD, INITIAL ENCOUNTER: Primary | ICD-10-CM

## 2018-06-05 DIAGNOSIS — E11.8 TYPE 2 DIABETES MELLITUS WITH COMPLICATION, WITHOUT LONG-TERM CURRENT USE OF INSULIN: ICD-10-CM

## 2018-06-05 PROCEDURE — 70450 CT HEAD/BRAIN W/O DYE: CPT | Mod: 26,,, | Performed by: RADIOLOGY

## 2018-06-05 PROCEDURE — 99214 OFFICE O/P EST MOD 30 MIN: CPT | Mod: S$GLB,,, | Performed by: FAMILY MEDICINE

## 2018-06-05 PROCEDURE — 3044F HG A1C LEVEL LT 7.0%: CPT | Mod: CPTII,S$GLB,, | Performed by: FAMILY MEDICINE

## 2018-06-05 PROCEDURE — 70450 CT HEAD/BRAIN W/O DYE: CPT | Mod: TC

## 2018-06-05 PROCEDURE — 3008F BODY MASS INDEX DOCD: CPT | Mod: CPTII,S$GLB,, | Performed by: FAMILY MEDICINE

## 2018-06-05 PROCEDURE — 99999 PR PBB SHADOW E&M-EST. PATIENT-LVL V: CPT | Mod: PBBFAC,,, | Performed by: FAMILY MEDICINE

## 2018-06-13 NOTE — PROGRESS NOTES
"[facility]  Progress Note    Subjective:       Patient ID: Clive Guzman is a 63 y.o. male.    Chief Complaint: Follow-up (bump on head from hitting car ceiling)    [id number] y.o.male she is here today after head trauma.  Patient hit his head hard when getting out of his car.  Since that time patient has had localized pain.  Patient denies dizziness, blurry vision, or loss of consciousness.  He is due for repeat labs today.  Patient has a history of mass in his left ear which was removed by ENT.  Patient has not followed up with ENT.  No further complaints at today's visit.  Review of Systems   Constitutional: Negative for chills, fatigue and fever.   HENT: Negative for congestion, ear pain, postnasal drip, sinus pressure, sneezing and sore throat.    Eyes: Negative for pain.   Respiratory: Negative for cough, shortness of breath and wheezing.    Cardiovascular: Negative for chest pain, palpitations and leg swelling.   Gastrointestinal: Negative for abdominal distention, abdominal pain, constipation, diarrhea, nausea and vomiting.   Genitourinary: Negative for difficulty urinating.   Musculoskeletal: Negative for back pain and myalgias.   Skin: Negative for rash.   Neurological: Positive for headaches. Negative for dizziness, seizures, syncope, weakness and numbness.   Psychiatric/Behavioral: Negative for sleep disturbance. The patient is not nervous/anxious.        Objective:      Vitals:    06/05/18 1409   BP: 108/63   BP Location: Right arm   Patient Position: Sitting   BP Method: Large (Automatic)   Pulse: 60   Temp: 97.7 °F (36.5 °C)   TempSrc: Oral   Weight: 110.5 kg (243 lb 9.7 oz)   Height: 5' 8" (1.727 m)     Body mass index is 37.04 kg/m².  Physical Exam   Constitutional: He is oriented to person, place, and time. He appears well-developed and well-nourished. No distress.   HENT:   Head: Normocephalic and atraumatic.   Cardiovascular: Normal rate, regular rhythm, normal heart sounds and intact " distal pulses.  Exam reveals no gallop and no friction rub.    No murmur heard.  Pulmonary/Chest: Effort normal and breath sounds normal. No respiratory distress. He has no rales.   Abdominal: Soft. He exhibits no distension and no mass. There is no rebound and no guarding. No hernia.   Musculoskeletal: Normal range of motion.   Neurological: He is alert and oriented to person, place, and time.   Skin: Skin is warm.   Psychiatric: He has a normal mood and affect. His behavior is normal. Judgment and thought content normal.   Vitals reviewed.      Assessment:       1. Traumatic injury of head, initial encounter    2. NAFLD (nonalcoholic fatty liver disease)    3. Type 2 diabetes mellitus with complication, without long-term current use of insulin    4. Vitamin D deficiency    5. Ear mass, left    6. Actinic keratosis        Plan:       Traumatic injury of head, initial encounter  -     Cancel: CT Head Without Contrast; Future; Expected date: 06/05/2018  -     CT Head Without Contrast; Future; Expected date: 06/05/2018    NAFLD (nonalcoholic fatty liver disease)  -     TSH; Future; Expected date: 06/05/2018  -     Comprehensive metabolic panel; Future; Expected date: 06/05/2018    Type 2 diabetes mellitus with complication, without long-term current use of insulin  -     Hemoglobin A1c; Future; Expected date: 06/05/2018  -     TSH; Future; Expected date: 06/05/2018  -     Ambulatory referral to Ophthalmology    Vitamin D deficiency  -     Vitamin D; Future; Expected date: 06/05/2018    Ear mass, left  -     Ambulatory referral to ENT    Actinic keratosis  -     Ambulatory referral to Dermatology      Follow-up in about 3 months (around 9/5/2018).  Lenny Fletcher MD  [facility]  6/13/2018 12:30 PM

## 2018-06-15 ENCOUNTER — PATIENT MESSAGE (OUTPATIENT)
Dept: FAMILY MEDICINE | Facility: CLINIC | Age: 63
End: 2018-06-15

## 2018-08-31 ENCOUNTER — DOCUMENTATION ONLY (OUTPATIENT)
Dept: FAMILY MEDICINE | Facility: CLINIC | Age: 63
End: 2018-08-31

## 2018-08-31 NOTE — PROGRESS NOTES
Pre-Visit Chart Review  For Appointment Scheduled on 09/04/2018    Health Maintenance Due   Topic Date Due    TETANUS VACCINE  06/06/1973    Zoster Vaccine  06/06/2015    Eye Exam  06/18/2016    Influenza Vaccine  08/01/2018    Foot Exam  10/16/2018

## 2018-10-26 ENCOUNTER — PATIENT MESSAGE (OUTPATIENT)
Dept: SLEEP MEDICINE | Facility: CLINIC | Age: 63
End: 2018-10-26

## 2018-11-06 ENCOUNTER — OFFICE VISIT (OUTPATIENT)
Dept: SLEEP MEDICINE | Facility: CLINIC | Age: 63
End: 2018-11-06
Payer: COMMERCIAL

## 2018-11-06 VITALS
HEIGHT: 68 IN | HEART RATE: 59 BPM | DIASTOLIC BLOOD PRESSURE: 68 MMHG | SYSTOLIC BLOOD PRESSURE: 126 MMHG | WEIGHT: 241.38 LBS | BODY MASS INDEX: 36.58 KG/M2

## 2018-11-06 DIAGNOSIS — G47.30 SLEEP APNEA, UNSPECIFIED TYPE: ICD-10-CM

## 2018-11-06 DIAGNOSIS — G47.33 OSA (OBSTRUCTIVE SLEEP APNEA): Primary | ICD-10-CM

## 2018-11-06 PROCEDURE — 3008F BODY MASS INDEX DOCD: CPT | Mod: CPTII,S$GLB,, | Performed by: PSYCHIATRY & NEUROLOGY

## 2018-11-06 PROCEDURE — 99999 PR PBB SHADOW E&M-EST. PATIENT-LVL III: CPT | Mod: PBBFAC,,, | Performed by: PSYCHIATRY & NEUROLOGY

## 2018-11-06 PROCEDURE — 99204 OFFICE O/P NEW MOD 45 MIN: CPT | Mod: S$GLB,,, | Performed by: PSYCHIATRY & NEUROLOGY

## 2018-11-06 NOTE — PROGRESS NOTES
Clive Guzman  was seen at the request of  Self, Aa[facility]erral for sleep evaluation.    11/06/2018 INITIAL HISTORY OF PRESENT ILLNESS:  Clive Guzman is a 63 y.o. male is here to be evaluated for a sleep disorder.       CHIEF COMPLAINT:      The patient's complaints include excessive daytime sleepiness, excessive daytime fatigue, snoring,  witnessed breathing pauses,  gasping for air in sleep and interrupted sleep since  20 yrs ago.    On his 3rd CPAP machine.     Sleep studies in [place] pre[facility] - n/a    Lately has been feeling more sleepy - machine due for upgrade. More restless sleep lately.    Also reports pain and stiffness that wakes him up.    Gained weight over the years    Using  - 13 cm - verified with manometry. Starting pressure feels low.    Wearing Comfort Gel FFM - lower lip apparently falling under the rim.      EPWORTH SLEEPINESS SCALE 11/3/2018   Sitting and reading 2   Watching TV 2   Sitting, inactive in a public place (e.g. a theatre or a meeting) 2   As a passenger in a car for an hour without a break 2   Lying down to rest in the afternoon when circumstances permit 3   Sitting and talking to someone 1   Sitting quietly after a lunch without alcohol 2   In a car, while stopped for a few minutes in traffic 1   Total score 15       PHQ9 11/3/2018   Little interest or pleasure in doing things: Several days   Feeling down, depressed or hopeless: Not at all   Trouble falling asleep, staying asleep, or sleeping too much: Several days   Feeling tired or having little energy: Several days   Poor appetite or overeating: More than half the days   Feeling bad about yourself- or that you are a failure or have let yourself or family down Not at all   Trouble concentrating on things, such as reading the newspaper or watching television: Several days   Moving or speaking so slowly that other people could have noticed. Or the opposite- being so fidgety or restless that you have been moving  around a lot more than usual: Not at all   Thoughts that you would be better off dead or hurting yourself in some way: Not at all   If you indicated you have experienced any of the aforementioned problems, how difficult have these problems made it for you to do your work, take care of things at home or get along with other people? Somewhat difficult   Total Score 6     GAD7 11/3/2018   Feelling nervous, anxious, on edge Not at all   Not being able to stop or control worrying Not at all   Worrying too much about different things Not at all   Trouble relaxing Not at all   Being so restless that its hard to sit still Several days   Becoming easily annoyed or irritable Several days   Feeling afraid as if something awful might happen Not at all   If you marked you are experiencing any of the aforementioned problems, how difficult have these made it for you to do your work, take care of things at home, or get along with other people? Somewhat difficult   KO-7 Score 2         SLEEP ROUTINE AND LIFESTYLE 11/06/2018 :  Sleep Clinic New Patient 11/3/2018   What time do you go to bed on a week day? (Give a range) 9-11 PM   What time do you go to bed on a day off? (Give a range) 12 to 2 AM   How long does it take you to fall asleep? (Give a range) 1/2 to 1 hour   On average, how many times per night do you wake up? 1   How long does it take you to fall back into sleep? (Give a range) 1-3 hours   What time do you wake up to start your day on a week day? (Give a range) 5 to 6 AM   What time do you wake up to start your day on a day off? (Give a range) 8 to 9 AM   What time do you get out of bed? (Give a range) 10-11 AM   On average, how many hours do you sleep? 6 to 7 hours   On average, how many naps do you take per day? 1 Nap   Rate your sleep quality from 0 to 5 (0-poor, 5-great). 3   Have you experienced:  Weight gain?   How much weight have you lost or gained (in lbs.) in the last year? 10 pounds   On average, how many  times per night do you go to the bathroom?  1   Have you ever had a sleep study/CPAP machine/surgery for sleep apnea? Yes   Have you ever had a CPAP machine for sleep apnea? Yes   Have you ever had surgery for sleep apnea? No       Sleep Clinic ROS  11/3/2018   Difficulty breathing through the nose?  Yes   Sore throat or dry mouth in the morning? Yes   Irregular or very fast heart beat?  Sometimes   Shortness of breath?  Yes   Acid reflux? Sometimes   Body aches and pains?  No   Morning headaches? Sometimes   Dizziness? No   Mood changes?  No   Do you exercise?  Sometimes   Do you feel like moving your legs a lot?  Sometimes          Denies symptoms concerning for parasomnia        Social History:      Occupation:[employer]  Bed partner: wife - my pt  Exercise routine: usually - not - once a week - 2 hrs  Caffeine:  no     PREVIOUS SLEEP STUDIES:     N/a from [place]      DME:       PAST MEDICAL HISTORY:    Active Ambulatory Problems     Diagnosis Date Noted    Late effect of adverse effect of drug, medical or biological substance 12/13/2009    Hyperlipidemia     Migraines     GERD (gastroesophageal reflux disease)     Obstructive sleep apnea on CPAP     Obesity (BMI 30-39.9) 12/17/2015    Diabetes 12/01/2016    NAFLD (nonalcoholic fatty liver disease)     Abdominal pain 04/12/2018    Screen for colon cancer 04/19/2018    Vitamin D deficiency 06/05/2018     Resolved Ambulatory Problems     Diagnosis Date Noted    Cellulitis of face 06/27/2012    Rash and other nonspecific skin eruption 08/20/2012     Past Medical History:   Diagnosis Date    Diabetes mellitus, type 2     GERD (gastroesophageal reflux disease)     Hyperlipidemia     Migraines     NAFLD (nonalcoholic fatty liver disease)     Obstructive sleep apnea on CPAP     Rash and other nonspecific skin eruption 8/20/2012                PAST SURGICAL HISTORY:    Past Surgical History:   Procedure Laterality Date    COLONOSCOPY      COLONOSCOPY  N/A 4/19/2018    Procedure: COLONOSCOPY;  Surgeon: Mark Wynne MD;  Location: [facility] ENDO;  Service: Endoscopy;  Laterality: N/A;    COLONOSCOPY N/A 4/19/2018    Performed by Mark Wynne MD at [facility]    ESOPHAGOGASTRODUODENOSCOPY (EGD) N/A 4/12/2018    Performed by [facility]ne MD at [facility]    HERNIA REPAIR      inguinal, left    tubes in ears      removed         FAMILY HISTORY:                Family History   Problem Relation Age of Onset    Diabetes Mother     Hypertension Mother     Thyroid disease Mother     Diabetes Maternal Grandfather     Stroke Maternal Grandfather     Diabetes Maternal Grandmother     Heart failure Father     Stroke Father     Thyroid disease Father     Melanoma Neg Hx     Psoriasis Neg Hx     Lupus Neg Hx     Eczema Neg Hx     Cirrhosis Neg Hx        SOCIAL HISTORY:          Tobacco:   Social History     Tobacco Use   Smoking Status Never Smoker   Smokeless Tobacco Never Used       alcohol use:    Social History     Substance and Sexual Activity   Alcohol Use No                   ALLERGIES:    Review of patient's allergies indicates:   Allergen Reactions    Lipitor  [atorvastatin]      Other reaction(s): achiness       CURRENT MEDICATIONS:    Current Outpatient Medications   Medication Sig Dispense Refill    aspirin (ASPIR-81) 81 MG EC tablet Take 1 tablet by mouth Daily. Every day      blood sugar diagnostic Strp One touch test strips    DAILY testing 30 strip 11    cholecalciferol, vitamin D3, 1,000 unit capsule Take 1 capsule by mouth Daily. Every day      DYMISTA 137-50 mcg/spray Spry   5    fenofibrate 160 MG Tab TAKE 1 TABLET(160 MG) BY MOUTH EVERY DAY 90 tablet 3    lancets 33 gauge Misc 1 lancet by Misc.(Non-Drug; Combo Route) route once daily. 100 each 4    magnesium oxide-Mg AA chelate 133 mg Tab Take 1 tablet by mouth.      metFORMIN (GLUCOPHAGE) 500 MG tablet Take 1 tablet (500 mg total) by mouth 2 (two) times daily with meals.  "180 tablet 3    nadolol (CORGARD) 20 MG tablet TAKE 1 TABLET(20 MG) BY MOUTH EVERY DAY 90 tablet 3    omeprazole (PRILOSEC) 40 MG capsule TAKE 1 CAPSULE(40 MG) BY MOUTH EVERY DAY 90 capsule 3    ZOLMitriptan (ZOMIG) 5 MG tablet TAKE 1 TABLET(5 MG) BY MOUTH TWICE DAILY AS NEEDED FOR MIGRAINE 9 tablet 3    fluocinonide (LIDEX) 0.05 % ointment Spot treat itchy bumps ankles and hands BID PRN lfare 60 g 1    hydrocortisone 2.5 % cream Apply topically 2 (two) times daily. 20 g 2    rosuvastatin (CRESTOR) 10 MG tablet        No current facility-administered medications for this visit.                         Otherwise, a balance of 10 systems reviewed is negative.    PHYSICAL EXAM:  /68   Pulse (!) 59   Ht 5' 8" (1.727 m)   Wt 109.5 kg (241 lb 6.5 oz)   BMI 36.71 kg/m²   GENERAL: Normal development, well groomed.  HEENT:   HEENT:  Conjunctivae are non-erythematous; Pupils equal, round, and reactive to light; Nose is symmetrical; Nasal mucosa is pink and moist; Septum is midline; Inferior turbinates are normal; Nasal airflow is normal; Posterior pharynx is pink; Modified Mallampati:II; Posterior palate is low; Tonsils not visualized; Uvula is wide and elongated;Tongue is enlarged; Dentition is fair; No TMJ tenderness; Jaw opening and protrusion without click and without discomfort.  NECK: Supple. Neck circumference is  inches. No thyromegaly. No palpable nodes.     SKIN: On face and neck: No abrasions, no rashes, no lesions.  No subcutaneous nodules are palpable.  RESPIRATORY: Chest is clear to auscultation.  Normal chest expansion and non-labored breathing at rest.  CARDIOVASCULAR: Normal S1, S2.  No murmurs, gallops or rubs. No carotid bruits bilaterally.  No edema. No clubbing. No cyanosis.    NEURO: Oriented to time, place and person. Normal attention span and concentration. Gait normal.    PSYCH: Affect is full. Mood is normal  MUSCULOSKELETAL: Moves 4 extremities. Gait normal.         Using [facility]: " "yes      ASSESSMENT:    1. Sleep Apnea NEC. The patient symptomatically has  excessive daytime sleepiness, snoring,  witnessed breathing pauses, excessive daytime fatigue, gasping for air in sleep, interrupted sleep and nocturia  with exam findings of "a crowded oral airway and elevated body mass index. The patient has medical co-morbidities of diabetes and hyperlipidemia, migraine which can be worsened by FRANSISCO. This warrants further investigation for possible obstructive sleep apnea.          PLAN:    Diagnostic: Polysomnogram Home to [facility]. The nature of this procedure and its indication was discussed with the patient. he would  like to come discuss PSG results.    Weight loss strategies were discussed in detail     Will increase his  to 15 cm empirically for comfort.    Will order supplies FFM  with [facility]:  [phone] - [place]:  or [phone] (ext 203)- [place]        More than 15 minutes of this 30 minutes visit was spent in counseling: during our discussion today, we talked about the etiology of  FRANSISCO as well as the potential ramifications of untreated sleep apnea, which could include daytime sleepiness, hypertension, heart disease and/or stroke.  We discussed potential treatment options, which could include weight loss, body positioning, continuous positive airway pressure (CPAP), or referral for surgical consideration. Meanwhile, he  is urged to avoid supine sleep, weight gain and alcoholic beverages since all of these can worsen FRANSISCO.     Precautions: The patient was advised to abstain from driving should he feel sleepy or drowsy.    Follow up: MD only after study is done and new machine APAP is provide.    Thank you for allowing me the opportunity to participate in the care of your patient.    This visit summary will be sent to referring provider via inbasket      "

## 2018-11-06 NOTE — PATIENT INSTRUCTIONS
SLEEP LAB (Patricia or Kushal) will contact you to schedulethe sleep study. Their number is [phone] (ext 2). Please call them if you do not hear from them in 10 business days from now.  [facility] is located on 7th floor of the [facility]; [place] lab is located in [facility].    SLEEP CLINIC (my assistant) will call you when the sleep study results are ready - if you have not heard from us by 2 weeks from the date of the study, please call [phone] (ext 1) or you can use [facility] to contact me.    You are advised to abstain from driving should you feel sleepy or drowsy.

## 2018-11-21 ENCOUNTER — TELEPHONE (OUTPATIENT)
Dept: SLEEP MEDICINE | Facility: OTHER | Age: 63
End: 2018-11-21

## 2018-12-04 ENCOUNTER — OFFICE VISIT (OUTPATIENT)
Dept: OPHTHALMOLOGY | Facility: CLINIC | Age: 63
End: 2018-12-04
Payer: COMMERCIAL

## 2018-12-04 DIAGNOSIS — H52.7 REFRACTIVE ERROR: ICD-10-CM

## 2018-12-04 DIAGNOSIS — H40.003 OPEN ANGLE GLAUCOMA WITH BORDERLINE INTRAOCULAR PRESSURE, BILATERAL: Primary | ICD-10-CM

## 2018-12-04 DIAGNOSIS — E11.9 DIABETES MELLITUS TYPE 2 WITHOUT RETINOPATHY: ICD-10-CM

## 2018-12-04 DIAGNOSIS — H53.9 VISUAL DISTURBANCE: ICD-10-CM

## 2018-12-04 PROCEDURE — 92015 DETERMINE REFRACTIVE STATE: CPT | Mod: S$GLB,,, | Performed by: OPHTHALMOLOGY

## 2018-12-04 PROCEDURE — 99999 PR PBB SHADOW E&M-EST. PATIENT-LVL III: CPT | Mod: PBBFAC,,, | Performed by: OPHTHALMOLOGY

## 2018-12-04 PROCEDURE — 92020 GONIOSCOPY: CPT | Mod: S$GLB,,, | Performed by: OPHTHALMOLOGY

## 2018-12-04 PROCEDURE — 92004 COMPRE OPH EXAM NEW PT 1/>: CPT | Mod: S$GLB,,, | Performed by: OPHTHALMOLOGY

## 2018-12-04 PROCEDURE — 76514 ECHO EXAM OF EYE THICKNESS: CPT | Mod: S$GLB,,, | Performed by: OPHTHALMOLOGY

## 2018-12-04 PROCEDURE — 92133 CPTRZD OPH DX IMG PST SGM ON: CPT | Mod: S$GLB,,, | Performed by: OPHTHALMOLOGY

## 2018-12-04 RX ORDER — CIPROFLOXACIN AND DEXAMETHASONE 3; 1 MG/ML; MG/ML
SUSPENSION/ DROPS AURICULAR (OTIC)
COMMUNITY
Start: 2018-11-19 | End: 2019-01-25

## 2018-12-05 RX ORDER — ZOLMITRIPTAN 5 MG/1
SPRAY, METERED NASAL
Qty: 6 EACH | Refills: 0 | Status: SHIPPED | OUTPATIENT
Start: 2018-12-05 | End: 2018-12-30 | Stop reason: SDUPTHER

## 2018-12-05 NOTE — TELEPHONE ENCOUNTER
Advised pt message from provider. Scheduled pt for est care appt 05/02/19. Pt verbalized understanding.

## 2018-12-05 NOTE — TELEPHONE ENCOUNTER
Sent in a prescription for Patients Zomig nasal spray.  Patient needs to be scheduled to establish care with new PCP

## 2018-12-06 NOTE — PROGRESS NOTES
HPI     Here for Diabetic exam dx: 1+  year ago BG doing ok still working at   control. Denies eye pain injury or surgery. States for the past month very   infrequently he can see a black streak on the side of right eye last   seconds notes when driving at night mainly vision and images are not as   sharp as they need to be. Does not use any eye gtts currently. Positive   family h/o mom with glaucoma and mac degeneration states glaucoma better   since having cataract surgery.     Lab Results       Component                Value               Date                       HGBA1C                   5.9 (H)             06/05/2018                 HGBA1C                   5.7 (H)             01/27/2018                 HGBA1C                   6.0                 03/11/2017            No results found for: LABA1C    Last edited by Tricia Murray on 12/4/2018  3:38 PM. (History)        ROS     Positive for: Neurological (denies neuropathy; takes Zomig for migraine   abortive, nadolol for prophylaxis), Endocrine (DM diagnosed 2017),   Allergic/Imm (nasal congestion from allergies from time to time),   Heme/Lymph (ASA)    Negative for: Genitourinary (denies nephropathy), Cardiovascular (denies   HTN), Eyes, Respiratory (denies asthma/SOB)    Last edited by Mariluz Loyola MD on 12/6/2018 10:22 AM.   (History)        Assessment /Plan     For exam results, see Encounter Report.    Open angle glaucoma with borderline intraocular pressure, bilateral  -     [facility]ina Tomography (HRT) - OU - Both Eyes; Standing  -     Ultrasound pachymetry    Visual disturbance    Diabetes mellitus type 2 without retinopathy    Refractive error          IOP a little high, C:D ratio asymmetry noted, although it appears it was there in 2015 on OCT nerve also, with OU borderline temporal at that time. Today's HRT shows borderline thinning OS, WNL OD.  CCT thick - 608//620  Gonio - open to SS OD, SS/CB OS  RTC 3 months for IOP check  with HVF 24-2 and OCT nerve.    Likely migraine aura, does not exceed 30 minutes    Maintain glucose control. Annual DFE recommended    MRx given

## 2018-12-27 ENCOUNTER — TELEPHONE (OUTPATIENT)
Dept: SLEEP MEDICINE | Facility: OTHER | Age: 63
End: 2018-12-27

## 2018-12-28 ENCOUNTER — HOSPITAL ENCOUNTER (OUTPATIENT)
Dept: SLEEP MEDICINE | Facility: OTHER | Age: 63
Discharge: HOME | End: 2018-12-28
Attending: PSYCHIATRY & NEUROLOGY
Payer: COMMERCIAL

## 2018-12-28 DIAGNOSIS — G47.30 SLEEP APNEA, UNSPECIFIED TYPE: ICD-10-CM

## 2018-12-28 DIAGNOSIS — G47.33 OSA (OBSTRUCTIVE SLEEP APNEA): ICD-10-CM

## 2018-12-28 PROCEDURE — 95800 SLP STDY UNATTENDED: CPT | Mod: 26,,, | Performed by: PSYCHIATRY & NEUROLOGY

## 2018-12-28 PROCEDURE — 95800 PR SLEEP STUDY, UNATTENDED, RECORD HEART RATE/O2 SAT/RESP ANAL/SLEEP TIME: ICD-10-PCS | Mod: 26,,, | Performed by: PSYCHIATRY & NEUROLOGY

## 2018-12-28 PROCEDURE — 95800 SLP STDY UNATTENDED: CPT

## 2018-12-31 ENCOUNTER — TELEPHONE (OUTPATIENT)
Dept: FAMILY MEDICINE | Facility: CLINIC | Age: 63
End: 2018-12-31

## 2018-12-31 RX ORDER — ZOLMITRIPTAN 5 MG/1
SPRAY, METERED NASAL
Qty: 1 EACH | Refills: 0 | Status: SHIPPED | OUTPATIENT
Start: 2018-12-31 | End: 2019-07-28 | Stop reason: SDUPTHER

## 2018-12-31 NOTE — TELEPHONE ENCOUNTER
----- Message from Ghazala Rider sent at 12/31/2018  9:33 AM CST -----  Contact: Patient  Type:  Patient Returning Call    Who Called:  Patient  Who Left Message for Patient:  Camilla  Does the patient know what this is regarding?:  Yes, returning Patient's initial call  Best Call Back Number:  [phone] (home)    Additional Information:  na

## 2018-12-31 NOTE — TELEPHONE ENCOUNTER
----- Message from Kanwal Castellon sent at 12/29/2018 10:19 AM CST -----  Last lab for A1c was 06/05 .... He is overdue ... Requesting the order .. To be able to have the lab on Monday (insurance deductible) call [phone]

## 2018-12-31 NOTE — TELEPHONE ENCOUNTER
Pt would like labs ordered. Advised that provider doesn't order labs unless he's been seen in the office. Pt verbalized understanding.

## 2019-01-09 DIAGNOSIS — G47.33 OBSTRUCTIVE SLEEP APNEA: Primary | ICD-10-CM

## 2019-01-09 NOTE — PROCEDURES
"Dear Referring Provider,    The sleep study that you ordered is complete. You have ordered sleep LAB services to perform the sleep study for Clive Guzman.     Please find Sleep Study result in "Chart Review" under the "Media tab."     As the ordering provider, you are responsible for reviewing the results and implementing a treatment plan with your patient. If you need a Sleep Medicine provider to explain the sleep study findings and arrange treatment for the patient, please refer patient for consultation to our Sleep Clinic via [facility] with Ambulatory Consult Sleep.    To do that please place an order for an "Ambulatory Consult Sleep" - it will go to our clinic work queue for our Medical Assistant to contact the patient for an appointment.     For any questions, please contact our clinic staff at [phone] to talk to clinical staff.      "

## 2019-01-24 ENCOUNTER — DOCUMENTATION ONLY (OUTPATIENT)
Dept: FAMILY MEDICINE | Facility: CLINIC | Age: 64
End: 2019-01-24

## 2019-01-24 NOTE — PROGRESS NOTES
Pre-Visit Chart Review  For Appointment Scheduled on 01/25/2019    Health Maintenance Due   Topic Date Due    TETANUS VACCINE  06/06/1973    Zoster Vaccine  06/06/2015    Foot Exam  10/16/2018    Hemoglobin A1c  12/05/2018    Urine Microalbumin  01/27/2019

## 2019-01-25 ENCOUNTER — OFFICE VISIT (OUTPATIENT)
Dept: FAMILY MEDICINE | Facility: CLINIC | Age: 64
End: 2019-01-25
Payer: COMMERCIAL

## 2019-01-25 VITALS
BODY MASS INDEX: 37.02 KG/M2 | HEIGHT: 68 IN | TEMPERATURE: 98 F | SYSTOLIC BLOOD PRESSURE: 119 MMHG | WEIGHT: 244.25 LBS | HEART RATE: 61 BPM | DIASTOLIC BLOOD PRESSURE: 66 MMHG

## 2019-01-25 DIAGNOSIS — J20.9 ACUTE BRONCHITIS, UNSPECIFIED ORGANISM: Primary | ICD-10-CM

## 2019-01-25 PROCEDURE — 99213 PR OFFICE/OUTPT VISIT, EST, LEVL III, 20-29 MIN: ICD-10-PCS | Mod: S$GLB,,, | Performed by: PHYSICIAN ASSISTANT

## 2019-01-25 PROCEDURE — 3008F BODY MASS INDEX DOCD: CPT | Mod: CPTII,S$GLB,, | Performed by: PHYSICIAN ASSISTANT

## 2019-01-25 PROCEDURE — 99999 PR PBB SHADOW E&M-EST. PATIENT-LVL IV: ICD-10-PCS | Mod: PBBFAC,,, | Performed by: PHYSICIAN ASSISTANT

## 2019-01-25 PROCEDURE — 99999 PR PBB SHADOW E&M-EST. PATIENT-LVL IV: CPT | Mod: PBBFAC,,, | Performed by: PHYSICIAN ASSISTANT

## 2019-01-25 PROCEDURE — 99213 OFFICE O/P EST LOW 20 MIN: CPT | Mod: S$GLB,,, | Performed by: PHYSICIAN ASSISTANT

## 2019-01-25 PROCEDURE — 3008F PR BODY MASS INDEX (BMI) DOCUMENTED: ICD-10-PCS | Mod: CPTII,S$GLB,, | Performed by: PHYSICIAN ASSISTANT

## 2019-01-25 RX ORDER — BENZONATATE 100 MG/1
100 CAPSULE ORAL 3 TIMES DAILY PRN
Qty: 30 CAPSULE | Refills: 0 | Status: SHIPPED | OUTPATIENT
Start: 2019-01-25 | End: 2019-02-04

## 2019-01-25 RX ORDER — ALBUTEROL SULFATE 90 UG/1
2 AEROSOL, METERED RESPIRATORY (INHALATION) EVERY 6 HOURS PRN
Qty: 18 G | Refills: 0 | Status: SHIPPED | OUTPATIENT
Start: 2019-01-25 | End: 2021-01-27

## 2019-01-25 NOTE — PROGRESS NOTES
Subjective:       Patient ID: Clive Guzman is a 63 y.o. male.    Chief Complaint: Chest Congestion (x4 days)    Cough   This is a new problem. The current episode started in the past 7 days. The problem has been gradually worsening. The problem occurs every few minutes. The cough is productive of sputum and productive of purulent sputum. Associated symptoms include headaches, myalgias, postnasal drip, rhinorrhea, sweats and wheezing. Pertinent negatives include no chest pain, chills, ear congestion, ear pain, fever, heartburn, hemoptysis, nasal congestion, rash, sore throat, shortness of breath or weight loss. The symptoms are aggravated by lying down. Risk factors for lung disease include travel. He has tried body position changes and steroid inhaler for the symptoms. The treatment provided mild relief. His past medical history is significant for bronchitis, environmental allergies and pneumonia. There is no history of asthma, bronchiectasis, COPD or emphysema.     Review of Systems   Constitutional: Negative for activity change, appetite change, chills, diaphoresis, fatigue, fever, unexpected weight change and weight loss.   HENT: Positive for congestion, postnasal drip and rhinorrhea. Negative for ear pain, sinus pressure and sore throat.    Respiratory: Positive for cough, chest tightness and wheezing. Negative for hemoptysis and shortness of breath.    Cardiovascular: Negative for chest pain and palpitations.   Gastrointestinal: Negative for abdominal pain and heartburn.   Musculoskeletal: Positive for myalgias.   Skin: Negative for rash.   Allergic/Immunologic: Positive for environmental allergies.   Neurological: Positive for headaches.       Objective:      Physical Exam   Constitutional: He is cooperative. He does not have a sickly appearance. He does not appear ill. No distress.   Obese body habitus     HENT:   Head: Normocephalic and atraumatic.   Right Ear: Tympanic membrane, external ear and ear  canal normal.   Left Ear: Tympanic membrane, external ear and ear canal normal.   Mouth/Throat: Oropharynx is clear and moist and mucous membranes are normal.   Eyes: Conjunctivae and EOM are normal. No scleral icterus.   Neck: Carotid bruit is not present.   Cardiovascular: Normal rate, regular rhythm and normal heart sounds.   Pulmonary/Chest: Effort normal. He has no wheezes. He has rhonchi (cleared post tussive ). He has no rales.   Musculoskeletal:        Right lower leg: He exhibits no edema.        Left lower leg: He exhibits no edema.   Neurological: He is alert.   Skin: Skin is warm and dry. Capillary refill takes less than 2 seconds.   Vitals reviewed.      Assessment:       1. Acute bronchitis, unspecified organism        Plan:       Clive was seen today for chest congestion.    Diagnoses and all orders for this visit:    Acute bronchitis, unspecified organism    Other orders  -     benzonatate (TESSALON) 100 MG capsule; Take 1 capsule (100 mg total) by mouth 3 (three) times daily as needed for Cough.  -     albuterol (VENTOLIN HFA) 90 mcg/actuation inhaler; Inhale 2 puffs into the lungs every 6 (six) hours as needed for Wheezing. Rescue

## 2019-01-30 DIAGNOSIS — E78.5 HYPERLIPIDEMIA, UNSPECIFIED HYPERLIPIDEMIA TYPE: ICD-10-CM

## 2019-01-30 RX ORDER — FENOFIBRATE 160 MG/1
TABLET ORAL
Qty: 90 TABLET | Refills: 0 | Status: SHIPPED | OUTPATIENT
Start: 2019-01-30 | End: 2019-06-04 | Stop reason: SDUPTHER

## 2019-02-13 ENCOUNTER — PATIENT MESSAGE (OUTPATIENT)
Dept: SLEEP MEDICINE | Facility: CLINIC | Age: 64
End: 2019-02-13

## 2019-02-20 ENCOUNTER — PATIENT MESSAGE (OUTPATIENT)
Dept: SLEEP MEDICINE | Facility: CLINIC | Age: 64
End: 2019-02-20

## 2019-02-20 ENCOUNTER — TELEPHONE (OUTPATIENT)
Dept: SLEEP MEDICINE | Facility: CLINIC | Age: 64
End: 2019-02-20

## 2019-02-25 ENCOUNTER — DOCUMENTATION ONLY (OUTPATIENT)
Dept: FAMILY MEDICINE | Facility: CLINIC | Age: 64
End: 2019-02-25

## 2019-02-25 NOTE — PROGRESS NOTES
Pre-Visit Chart Review  For Appointment Scheduled on 2/26/19.    Health Maintenance Due   Topic Date Due    TETANUS VACCINE  06/06/1973    Low Dose Statin  06/06/1976    Foot Exam  10/16/2018    Hemoglobin A1c  12/05/2018    Lipid Panel  01/27/2019    Urine Microalbumin  01/27/2019

## 2019-02-26 ENCOUNTER — OFFICE VISIT (OUTPATIENT)
Dept: FAMILY MEDICINE | Facility: CLINIC | Age: 64
End: 2019-02-26
Payer: COMMERCIAL

## 2019-02-26 VITALS
TEMPERATURE: 98 F | HEART RATE: 53 BPM | BODY MASS INDEX: 36.38 KG/M2 | SYSTOLIC BLOOD PRESSURE: 127 MMHG | WEIGHT: 240.06 LBS | HEIGHT: 68 IN | DIASTOLIC BLOOD PRESSURE: 64 MMHG

## 2019-02-26 DIAGNOSIS — B96.89 ACUTE BACTERIAL BRONCHITIS: Primary | ICD-10-CM

## 2019-02-26 DIAGNOSIS — J20.8 ACUTE BACTERIAL BRONCHITIS: Primary | ICD-10-CM

## 2019-02-26 DIAGNOSIS — M54.50 ACUTE LEFT-SIDED LOW BACK PAIN WITHOUT SCIATICA: ICD-10-CM

## 2019-02-26 PROCEDURE — 99999 PR PBB SHADOW E&M-EST. PATIENT-LVL V: ICD-10-PCS | Mod: PBBFAC,,, | Performed by: PHYSICIAN ASSISTANT

## 2019-02-26 PROCEDURE — 99213 OFFICE O/P EST LOW 20 MIN: CPT | Mod: S$GLB,,, | Performed by: PHYSICIAN ASSISTANT

## 2019-02-26 PROCEDURE — 99999 PR PBB SHADOW E&M-EST. PATIENT-LVL V: CPT | Mod: PBBFAC,,, | Performed by: PHYSICIAN ASSISTANT

## 2019-02-26 PROCEDURE — 99213 PR OFFICE/OUTPT VISIT, EST, LEVL III, 20-29 MIN: ICD-10-PCS | Mod: S$GLB,,, | Performed by: PHYSICIAN ASSISTANT

## 2019-02-26 PROCEDURE — 3008F BODY MASS INDEX DOCD: CPT | Mod: CPTII,S$GLB,, | Performed by: PHYSICIAN ASSISTANT

## 2019-02-26 PROCEDURE — 3008F PR BODY MASS INDEX (BMI) DOCUMENTED: ICD-10-PCS | Mod: CPTII,S$GLB,, | Performed by: PHYSICIAN ASSISTANT

## 2019-02-26 RX ORDER — PREDNISONE 20 MG/1
20 TABLET ORAL 2 TIMES DAILY
Qty: 10 TABLET | Refills: 0 | Status: SHIPPED | OUTPATIENT
Start: 2019-02-26 | End: 2019-03-03

## 2019-02-26 RX ORDER — DOXYCYCLINE 100 MG/1
100 CAPSULE ORAL 2 TIMES DAILY
Qty: 20 CAPSULE | Refills: 0 | Status: SHIPPED | OUTPATIENT
Start: 2019-02-26 | End: 2019-03-28

## 2019-02-27 NOTE — PROGRESS NOTES
Subjective:       Patient ID: Clvie Guzman is a 63 y.o. male.    Chief Complaint: Cough    Cough   This is a chronic problem. The current episode started more than 1 month ago. The problem has been waxing and waning. The problem occurs every few minutes. The cough is productive of sputum and productive of purulent sputum. Associated symptoms include ear congestion, myalgias, postnasal drip and wheezing. Pertinent negatives include no chest pain, chills, ear pain, fever, headaches, heartburn, hemoptysis, nasal congestion, rash, rhinorrhea, sore throat, shortness of breath, sweats or weight loss. The symptoms are aggravated by dust, lying down and pollens. He has tried OTC cough suppressant, body position changes, prescription cough suppressant and rest for the symptoms. The treatment provided mild relief. His past medical history is significant for bronchitis, environmental allergies and pneumonia. There is no history of asthma, bronchiectasis or emphysema.   Back Pain   This is a chronic problem. The current episode started more than 1 month ago. The problem occurs intermittently. The pain is present in the lumbar spine. The quality of the pain is described as aching. The pain does not radiate. The pain is at a severity of 4/10. The pain is worse during the day. Exacerbated by: walking  Pertinent negatives include no abdominal pain, bladder incontinence, bowel incontinence, chest pain, fever, headaches, leg pain, numbness, paresthesias, tingling or weight loss.     Review of Systems   Constitutional: Negative for chills, fever and weight loss.   HENT: Positive for postnasal drip. Negative for ear pain, rhinorrhea and sore throat.    Respiratory: Positive for cough and wheezing. Negative for hemoptysis, chest tightness and shortness of breath.    Cardiovascular: Negative for chest pain.   Gastrointestinal: Negative for abdominal pain, bowel incontinence and heartburn.   Genitourinary: Negative for bladder  incontinence.   Musculoskeletal: Positive for back pain and myalgias.   Skin: Negative for rash.   Allergic/Immunologic: Positive for environmental allergies.   Neurological: Negative for tingling, numbness, headaches and paresthesias.       Objective:      Physical Exam   Constitutional: He appears well-developed and well-nourished. He is cooperative. No distress.   HENT:   Head: Normocephalic and atraumatic.   Right Ear: Tympanic membrane, external ear and ear canal normal.   Left Ear: Tympanic membrane, external ear and ear canal normal.   Nose: No mucosal edema or rhinorrhea.   Mouth/Throat: No oropharyngeal exudate, posterior oropharyngeal edema or posterior oropharyngeal erythema.   Cardiovascular: Normal rate, regular rhythm and normal heart sounds.   Pulmonary/Chest: Effort normal and breath sounds normal. He has no wheezes. He has no rales.   Abdominal: Soft. Bowel sounds are normal. He exhibits no distension. There is no tenderness.   Musculoskeletal:        Lumbar back: He exhibits decreased range of motion and tenderness. He exhibits no bony tenderness.        Back:    Lymphadenopathy:        Head (right side): No tonsillar adenopathy present.        Head (left side): No tonsillar adenopathy present.     He has no cervical adenopathy.   Neurological: He is alert.   Reflex Scores:       Patellar reflexes are 2+ on the right side and 2+ on the left side.       Achilles reflexes are 2+ on the right side and 2+ on the left side.  Vitals reviewed.      Assessment:       1. Acute bacterial bronchitis    2. Acute left-sided low back pain without sciatica        Plan:       Clive was seen today for cough.    Diagnoses and all orders for this visit:    Acute bacterial bronchitis  -     doxycycline (VIBRAMYCIN) 100 MG Cap; Take 1 capsule (100 mg total) by mouth 2 (two) times daily.  -     predniSONE (DELTASONE) 20 MG tablet; Take 1 tablet (20 mg total) by mouth 2 (two) times daily. for 5 days    Acute left-sided  low back pain without sciatica  -     Ambulatory Referral to [facility]

## 2019-03-01 ENCOUNTER — PATIENT MESSAGE (OUTPATIENT)
Dept: SLEEP MEDICINE | Facility: CLINIC | Age: 64
End: 2019-03-01

## 2019-03-06 ENCOUNTER — OFFICE VISIT (OUTPATIENT)
Dept: SLEEP MEDICINE | Facility: CLINIC | Age: 64
End: 2019-03-06
Payer: COMMERCIAL

## 2019-03-06 VITALS
SYSTOLIC BLOOD PRESSURE: 120 MMHG | DIASTOLIC BLOOD PRESSURE: 65 MMHG | BODY MASS INDEX: 36.36 KG/M2 | WEIGHT: 239.88 LBS | HEIGHT: 68 IN | HEART RATE: 64 BPM

## 2019-03-06 DIAGNOSIS — G47.33 OBSTRUCTIVE SLEEP APNEA: Primary | ICD-10-CM

## 2019-03-06 PROCEDURE — 99999 PR PBB SHADOW E&M-EST. PATIENT-LVL III: CPT | Mod: PBBFAC,,, | Performed by: NURSE PRACTITIONER

## 2019-03-06 PROCEDURE — 3008F BODY MASS INDEX DOCD: CPT | Mod: CPTII,S$GLB,, | Performed by: NURSE PRACTITIONER

## 2019-03-06 PROCEDURE — 99214 PR OFFICE/OUTPT VISIT, EST, LEVL IV, 30-39 MIN: ICD-10-PCS | Mod: S$GLB,,, | Performed by: NURSE PRACTITIONER

## 2019-03-06 PROCEDURE — 99999 PR PBB SHADOW E&M-EST. PATIENT-LVL III: ICD-10-PCS | Mod: PBBFAC,,, | Performed by: NURSE PRACTITIONER

## 2019-03-06 PROCEDURE — 3008F PR BODY MASS INDEX (BMI) DOCUMENTED: ICD-10-PCS | Mod: CPTII,S$GLB,, | Performed by: NURSE PRACTITIONER

## 2019-03-06 PROCEDURE — 99214 OFFICE O/P EST MOD 30 MIN: CPT | Mod: S$GLB,,, | Performed by: NURSE PRACTITIONER

## 2019-03-06 NOTE — PROGRESS NOTES
"Clive Guzman seen in follow-up for FRANSISCO management. Last seen in clinic by Dr. Vann 11/06/2018. This is his initial visit with me.     Pt returns after set up of PAP machine on 01/28/2019 at [facility]. Pt sleep complaints of snoring, witnessed apneas, excessive daytime sleepiness, and excessive daytime fatigue  now resolved with PAP use. Reports oral/nasal drying with Airfit F20 FF mask. Medium cushion fits the best, but currently using Large because he lost medium cushion. Humidifier 3/5. Reports overt mask leaks due to large mask.  Denies rainout.  Denies pressure intolerance or air hunger. Denies congestion. Denies aerophagia. ESS 8    Dreamstation APAP 13 - 18 cm, 30 days, > 4 hours: 96.7%, Predicted AHI: 5.6, Ave LL: 11.2%, Ave PB: 2.4%, 90%tile pressure: 15.5 cm    Requal Sleep Study: 12/26/2018  lb. AHI 12, RDI 31, Oxygen daisha 87.8%, 0.5% %time < 90% SpO2    Review of Systems:   Sleep related symptoms as per HPI.  Otherwise, a balance of 10 systems reviewed is negative.    Physical Exam:   /65   Pulse 64   Ht 5' 8" (1.727 m)   Wt 108.8 kg (239 lb 13.8 oz)   BMI 36.47 kg/m²     GENERAL: Well groomed      Assessment:     FRANSISCO, mild by AHI, severe by RDI. The patient symptomatically has snoring, witnessed apneas, excessive daytime sleepiness, and excessive daytime fatigue resolved with CPAP. The patient is adherent on CPAP and experiencing symptomatic benefit. This warrants continued treatment.     Nasal congestion, likely from environmental allergies, occasionally affects PAP use comfort; takes Dymista on prn basis.     Plan:     Continue APAP, changed pressure to 13 - 20 cm. Improve mask fit by obtaining size medium cushion, which should also improve residual AHI and ameliorate oral/nasal drying. Increase humidifier 4-5/5. RTC 12 months, sooner if needed.     For nasal congestion, recommended daily dosing of Dymista plus OTC antihistamine. May also try nasal rinses, before instilling nasal " spray.     Education: During our discussion today, we talked about the etiology of obstructive sleep apnea as well as the potential ramifications of untreated sleep apnea, which could include daytime sleepiness, hypertension, heart disease and/or stroke. We discussed potential treatment options, which could include weight loss, body positioning, continuous positive airway pressure (CPAP), or referral for surgical consideration.     Behavior modification which includes losing weight, exercising, changing the sleep position, abstaining from alcohol, and avoiding certain medications    Precautions: The patient was advised to abstain from driving should they feel sleepy or drowsy

## 2019-03-11 ENCOUNTER — TELEPHONE (OUTPATIENT)
Dept: SPINE | Facility: CLINIC | Age: 64
End: 2019-03-11

## 2019-03-11 DIAGNOSIS — E11.8 TYPE 2 DIABETES MELLITUS WITH COMPLICATION, WITHOUT LONG-TERM CURRENT USE OF INSULIN: ICD-10-CM

## 2019-03-12 ENCOUNTER — HOSPITAL ENCOUNTER (OUTPATIENT)
Dept: RADIOLOGY | Facility: HOSPITAL | Age: 64
Discharge: HOME | End: 2019-03-12
Attending: PHYSICAL MEDICINE & REHABILITATION
Payer: COMMERCIAL

## 2019-03-12 ENCOUNTER — INITIAL CONSULT (OUTPATIENT)
Dept: SPINE | Facility: CLINIC | Age: 64
End: 2019-03-12
Payer: COMMERCIAL

## 2019-03-12 VITALS
DIASTOLIC BLOOD PRESSURE: 72 MMHG | SYSTOLIC BLOOD PRESSURE: 120 MMHG | WEIGHT: 238.13 LBS | HEART RATE: 64 BPM | BODY MASS INDEX: 36.09 KG/M2 | HEIGHT: 68 IN

## 2019-03-12 DIAGNOSIS — M54.50 CHRONIC LEFT-SIDED LOW BACK PAIN WITHOUT SCIATICA: ICD-10-CM

## 2019-03-12 DIAGNOSIS — G89.29 CHRONIC LEFT-SIDED LOW BACK PAIN WITHOUT SCIATICA: Primary | ICD-10-CM

## 2019-03-12 DIAGNOSIS — G89.29 CHRONIC LEFT-SIDED LOW BACK PAIN WITHOUT SCIATICA: ICD-10-CM

## 2019-03-12 DIAGNOSIS — M54.50 CHRONIC LEFT-SIDED LOW BACK PAIN WITHOUT SCIATICA: Primary | ICD-10-CM

## 2019-03-12 PROCEDURE — 3008F BODY MASS INDEX DOCD: CPT | Mod: ,,, | Performed by: PHYSICAL MEDICINE & REHABILITATION

## 2019-03-12 PROCEDURE — 99204 OFFICE O/P NEW MOD 45 MIN: CPT | Mod: ,,, | Performed by: PHYSICAL MEDICINE & REHABILITATION

## 2019-03-12 PROCEDURE — 72110 X-RAY EXAM L-2 SPINE 4/>VWS: CPT | Mod: TC,FY

## 2019-03-12 PROCEDURE — 3008F PR BODY MASS INDEX (BMI) DOCUMENTED: ICD-10-PCS | Mod: ,,, | Performed by: PHYSICAL MEDICINE & REHABILITATION

## 2019-03-12 PROCEDURE — 99204 PR OFFICE/OUTPT VISIT, NEW, LEVL IV, 45-59 MIN: ICD-10-PCS | Mod: ,,, | Performed by: PHYSICAL MEDICINE & REHABILITATION

## 2019-03-12 PROCEDURE — 72110 X-RAY EXAM L-2 SPINE 4/>VWS: CPT | Mod: 26,,, | Performed by: RADIOLOGY

## 2019-03-12 PROCEDURE — 72110 XR LUMBAR SPINE 5 VIEW WITH FLEX AND EXT: ICD-10-PCS | Mod: 26,,, | Performed by: RADIOLOGY

## 2019-03-12 RX ORDER — CALCIUM CITRATE/VITAMIN D3 200MG-6.25
TABLET ORAL
Qty: 200 STRIP | Refills: 0 | Status: SHIPPED | OUTPATIENT
Start: 2019-03-12 | End: 2021-08-18 | Stop reason: SDUPTHER

## 2019-03-12 NOTE — PROGRESS NOTES
SUBJECTIVE:    Patient ID: Clive Guzman is a 63 y.o. male.    Chief Complaint: Consult (pain with walking over long time periods )    This is a 63-year-old man who sees Dr. Amezquita for his primary care.  Has history of diabetes and hyperlipidemia but no heart disease or personal history of cancer.  She presents with a 1-2 month history of primarily left-sided low back pain that is exacerbated by walking.  He 1st noticed it a couple of months ago after doing several 5 mi +walks.  Other than that there were no injuries.  He says that since that time if he walks any significant distance such is to cut his front yd which takes about a half an hour the following day he will have severe low back pain.  He denies any leg pain or weakness.  He denies bowel or bladder dysfunction fever chills sweats or unexpected weight loss.  He has not been taking any medication for the discomfort.  He also describes a clicking sensation in the low back that occurs when walking.          Past Medical History:   Diagnosis Date    Diabetes mellitus, type 2     GERD (gastroesophageal reflux disease)     Hyperlipidemia     Migraines     NAFLD (nonalcoholic fatty liver disease)     Obstructive sleep apnea on CPAP     @ 13 cm/H2)    Rash and other nonspecific skin eruption 8/20/2012     Social History     Socioeconomic History    Marital status:      Spouse name: Not on file    Number of children: Not on file    Years of education: Not on file    Highest education level: Not on file   Social Needs    Financial resource strain: Not on file    Food insecurity - worry: Not on file    Food insecurity - inability: Not on file    Transportation needs - medical: Not on file    Transportation needs - non-medical: Not on file   Occupational History    Not on file   Tobacco Use    Smoking status: Never Smoker    Smokeless tobacco: Never Used   Substance and Sexual Activity    Alcohol use: No    Drug use: No    Sexual  "activity: Not on file   Other Topics Concern    Not on file   Social History Narrative    Not on file     Past Surgical History:   Procedure Laterality Date    COLONOSCOPY      COLONOSCOPY N/A 4/19/2018    [facility]    ESOPHAGOGASTRODUODENOSCOPY (EGD) N/A 4/12/2018    [facility]    HERNIA REPAIR      inguinal, left    tubes in ears Left 11/08/2018    removed     Family History   Problem Relation Age of Onset    Diabetes Mother     Hypertension Mother     Thyroid disease Mother     Diabetes Maternal Grandfather     Stroke Maternal Grandfather     Diabetes Maternal Grandmother     Heart failure Father     Stroke Father     Thyroid disease Father     Melanoma Neg Hx     Psoriasis Neg Hx     Lupus Neg Hx     Eczema Neg Hx     Cirrhosis Neg Hx      Vitals:    03/12/19 1442   BP: 120/72   Pulse: 64   Weight: 108 kg (238 lb 1.6 oz)   Height: 5' 8" (1.727 m)       Review of Systems   Constitutional: Negative for chills, diaphoresis, fatigue, fever and unexpected weight change.   HENT: Negative for trouble swallowing.    Eyes: Negative for visual disturbance.   Respiratory: Negative for shortness of breath.    Cardiovascular: Negative for chest pain.   Gastrointestinal: Negative for abdominal pain, constipation, diarrhea, nausea and vomiting.   Genitourinary: Negative for difficulty urinating.   Musculoskeletal: Negative for arthralgias, back pain, gait problem, joint swelling, myalgias, neck pain and neck stiffness.   Neurological: Negative for dizziness, speech difficulty, weakness, light-headedness, numbness and headaches.          Objective:      Physical Exam   Constitutional: He is oriented to person, place, and time. He appears well-developed and well-nourished.   Neurological: He is alert and oriented to person, place, and time.   He is awake and in no acute distress  No point tenderness external lesions or palpable masses " about the lumbar spine  Forward flexion is normal and painless  Extension causes mild pain at the lumbosacral junction which is worsened by rotation to the right greater than left  He can heel and toe walk normally  Deep tendon reflexes are +1 at both knees and both ankles  Strength is normal in both lower extremities  Straight leg raising is negative bilaterally  DORA testing on the left causes mild pain on the left at the lumbosacral junction.  DORA testing on the right is negative           Assessment:       1. Chronic left-sided low back pain without sciatica           Plan:     he has a nonfocal examination from a neurological standpoint and no historical red flags.  I believe all of his symptoms including the clicking sensation or related to underlying degenerative disc disease.  I think he has a flare-up of pain because of the long distances he walked.  I suspect he will have gradual return to baseline.  I am going to get some x-rays on his back and I will see him after that.  Consider physical therapy evaluation.      Chronic left-sided low back pain without sciatica  -     X-Ray Lumbar Complete With Flex And Ext; Future; Expected date: 03/12/2019

## 2019-03-13 ENCOUNTER — OFFICE VISIT (OUTPATIENT)
Dept: SPINE | Facility: CLINIC | Age: 64
End: 2019-03-13
Payer: COMMERCIAL

## 2019-03-13 VITALS — WEIGHT: 238.13 LBS | BODY MASS INDEX: 36.09 KG/M2 | HEIGHT: 68 IN

## 2019-03-13 DIAGNOSIS — M54.50 CHRONIC LEFT-SIDED LOW BACK PAIN WITHOUT SCIATICA: Primary | ICD-10-CM

## 2019-03-13 DIAGNOSIS — G89.29 CHRONIC LEFT-SIDED LOW BACK PAIN WITHOUT SCIATICA: Primary | ICD-10-CM

## 2019-03-13 PROCEDURE — 3008F PR BODY MASS INDEX (BMI) DOCUMENTED: ICD-10-PCS | Mod: ,,, | Performed by: PHYSICAL MEDICINE & REHABILITATION

## 2019-03-13 PROCEDURE — 99213 OFFICE O/P EST LOW 20 MIN: CPT | Mod: ,,, | Performed by: PHYSICAL MEDICINE & REHABILITATION

## 2019-03-13 PROCEDURE — 99213 PR OFFICE/OUTPT VISIT, EST, LEVL III, 20-29 MIN: ICD-10-PCS | Mod: ,,, | Performed by: PHYSICAL MEDICINE & REHABILITATION

## 2019-03-13 PROCEDURE — 3008F BODY MASS INDEX DOCD: CPT | Mod: ,,, | Performed by: PHYSICAL MEDICINE & REHABILITATION

## 2019-03-13 NOTE — PROGRESS NOTES
SUBJECTIVE:    Patient ID: Clive Guzman is a 63 y.o. male.    Chief Complaint: Results (xray )    He is here to review his lumbar x-rays which were done to evaluate his complaints of low back pain and clicking sensation in the lumbar spine.  The x-rays summarized below:    Degenerative disc disease identified at T12-L1 and L2-3 with spondylosis otherwise negative lumbosacral spine x-rays    Clinically he is about the same          Past Medical History:   Diagnosis Date    Diabetes mellitus, type 2     GERD (gastroesophageal reflux disease)     Hyperlipidemia     Migraines     NAFLD (nonalcoholic fatty liver disease)     Obstructive sleep apnea on CPAP     @ 13 cm/H2)    Rash and other nonspecific skin eruption 8/20/2012     Social History     Socioeconomic History    Marital status:      Spouse name: Not on file    Number of children: Not on file    Years of education: Not on file    Highest education level: Not on file   Social Needs    Financial resource strain: Not on file    Food insecurity - worry: Not on file    Food insecurity - inability: Not on file    Transportation needs - medical: Not on file    Transportation needs - non-medical: Not on file   Occupational History    Not on file   Tobacco Use    Smoking status: Never Smoker    Smokeless tobacco: Never Used   Substance and Sexual Activity    Alcohol use: No    Drug use: No    Sexual activity: Not on file   Other Topics Concern    Not on file   Social History Narrative    Not on file     Past Surgical History:   Procedure Laterality Date    COLONOSCOPY      COLONOSCOPY N/A 4/19/2018    [facility]formed [facility]    ESOPHAGOGASTRODUODENOSCOPY (EGD) N/A 4/12/2018    [facility]    HERNIA REPAIR      inguinal, left    tubes in ears Left 11/08/2018    removed     Family History   Problem Relation Age of Onset    Diabetes Mother     Hypertension Mother      "Thyroid disease Mother     Diabetes Maternal Grandfather     Stroke Maternal Grandfather     Diabetes Maternal Grandmother     Heart failure Father     Stroke Father     Thyroid disease Father     Melanoma Neg Hx     Psoriasis Neg Hx     Lupus Neg Hx     Eczema Neg Hx     Cirrhosis Neg Hx      Vitals:    03/13/19 1546   Weight: 108 kg (238 lb 1.6 oz)   Height: 5' 8" (1.727 m)       Review of Systems   Constitutional: Negative for chills, diaphoresis, fatigue, fever and unexpected weight change.   HENT: Negative for trouble swallowing.    Eyes: Negative for visual disturbance.   Respiratory: Negative for shortness of breath.    Cardiovascular: Negative for chest pain.   Gastrointestinal: Negative for abdominal pain, constipation, diarrhea, nausea and vomiting.   Genitourinary: Negative for difficulty urinating.   Musculoskeletal: Negative for arthralgias, back pain, gait problem, joint swelling, myalgias, neck pain and neck stiffness.   Neurological: Negative for dizziness, speech difficulty, weakness, light-headedness, numbness and headaches.          Objective:      Physical Exam   Constitutional: He appears well-developed and well-nourished.   Not examined           Assessment:       1. Chronic left-sided low back pain without sciatica           Plan:     he has expected degenerative changes of the lumbar spine which I think account for his back pain.  I do not know what the exact etiology of the clicking sensation is but I suspected is benign, probably related to degeneration as well.  I think he will benefit from physical therapy.  He can follow up with me in 6 or 8 weeks and if he is not better then we will consider MRI and subsequent epidural injections        Chronic left-sided low back pain without sciatica  -     Ambulatory Referral to Physical/Occupational Therapy        "

## 2019-03-25 DIAGNOSIS — E11.8 TYPE 2 DIABETES MELLITUS WITH COMPLICATION, WITHOUT LONG-TERM CURRENT USE OF INSULIN: ICD-10-CM

## 2019-03-25 DIAGNOSIS — G43.909 MIGRAINE WITHOUT STATUS MIGRAINOSUS, NOT INTRACTABLE, UNSPECIFIED MIGRAINE TYPE: ICD-10-CM

## 2019-03-25 DIAGNOSIS — K21.9 GASTROESOPHAGEAL REFLUX DISEASE, ESOPHAGITIS PRESENCE NOT SPECIFIED: ICD-10-CM

## 2019-03-25 RX ORDER — NADOLOL 20 MG/1
TABLET ORAL
Qty: 90 TABLET | Refills: 0 | Status: SHIPPED | OUTPATIENT
Start: 2019-03-25 | End: 2019-06-19 | Stop reason: SDUPTHER

## 2019-03-25 RX ORDER — OMEPRAZOLE 40 MG/1
CAPSULE, DELAYED RELEASE ORAL
Qty: 90 CAPSULE | Refills: 0 | Status: SHIPPED | OUTPATIENT
Start: 2019-03-25 | End: 2019-06-19 | Stop reason: SDUPTHER

## 2019-03-25 RX ORDER — METFORMIN HYDROCHLORIDE 500 MG/1
TABLET ORAL
Qty: 180 TABLET | Refills: 0 | Status: SHIPPED | OUTPATIENT
Start: 2019-03-25 | End: 2019-06-19 | Stop reason: SDUPTHER

## 2019-03-28 ENCOUNTER — HOSPITAL ENCOUNTER (OUTPATIENT)
Dept: RADIOLOGY | Facility: HOSPITAL | Age: 64
Discharge: HOME | End: 2019-03-28
Attending: NURSE PRACTITIONER
Payer: COMMERCIAL

## 2019-03-28 ENCOUNTER — OFFICE VISIT (OUTPATIENT)
Dept: PRIMARY CARE CLINIC | Facility: CLINIC | Age: 64
End: 2019-03-28
Attending: NURSE PRACTITIONER
Payer: COMMERCIAL

## 2019-03-28 VITALS
DIASTOLIC BLOOD PRESSURE: 56 MMHG | TEMPERATURE: 98 F | HEIGHT: 68 IN | OXYGEN SATURATION: 96 % | HEART RATE: 64 BPM | RESPIRATION RATE: 16 BRPM | BODY MASS INDEX: 36.29 KG/M2 | WEIGHT: 239.44 LBS | SYSTOLIC BLOOD PRESSURE: 121 MMHG

## 2019-03-28 DIAGNOSIS — J30.89 ENVIRONMENTAL AND SEASONAL ALLERGIES: ICD-10-CM

## 2019-03-28 DIAGNOSIS — J41.8 MIXED SIMPLE AND MUCOPURULENT CHRONIC BRONCHITIS: ICD-10-CM

## 2019-03-28 DIAGNOSIS — R05.3 CHRONIC COUGH: ICD-10-CM

## 2019-03-28 DIAGNOSIS — J41.8 MIXED SIMPLE AND MUCOPURULENT CHRONIC BRONCHITIS: Primary | ICD-10-CM

## 2019-03-28 PROCEDURE — 99999 PR PBB SHADOW E&M-EST. PATIENT-LVL IV: ICD-10-PCS | Mod: PBBFAC,,, | Performed by: NURSE PRACTITIONER

## 2019-03-28 PROCEDURE — 71046 X-RAY EXAM CHEST 2 VIEWS: CPT | Mod: 26,,, | Performed by: RADIOLOGY

## 2019-03-28 PROCEDURE — 71046 X-RAY EXAM CHEST 2 VIEWS: CPT | Mod: TC,FY

## 2019-03-28 PROCEDURE — 3008F PR BODY MASS INDEX (BMI) DOCUMENTED: ICD-10-PCS | Mod: CPTII,S$GLB,, | Performed by: NURSE PRACTITIONER

## 2019-03-28 PROCEDURE — 71046 XR CHEST PA AND LATERAL: ICD-10-PCS | Mod: 26,,, | Performed by: RADIOLOGY

## 2019-03-28 PROCEDURE — 99213 OFFICE O/P EST LOW 20 MIN: CPT | Mod: S$GLB,,, | Performed by: NURSE PRACTITIONER

## 2019-03-28 PROCEDURE — 3008F BODY MASS INDEX DOCD: CPT | Mod: CPTII,S$GLB,, | Performed by: NURSE PRACTITIONER

## 2019-03-28 PROCEDURE — 99999 PR PBB SHADOW E&M-EST. PATIENT-LVL IV: CPT | Mod: PBBFAC,,, | Performed by: NURSE PRACTITIONER

## 2019-03-28 PROCEDURE — 99213 PR OFFICE/OUTPT VISIT, EST, LEVL III, 20-29 MIN: ICD-10-PCS | Mod: S$GLB,,, | Performed by: NURSE PRACTITIONER

## 2019-03-28 NOTE — PATIENT INSTRUCTIONS
Claritin D 12 hour with Afrin Nasal spray 2 sprays each nostril twice daily for 3 days. Then throw the Afrin away.    Do not use the Dymista with this. Do not use any other antihistamine    Xyzal take at night for a plain antihistamine use with Sensimist (flonase) BUT do not use your Sensimist when you are on your Dulera.       Formoterol; Mometasone metered dose inhaler    What is this medicine?  FORMOTEROL; MOMETASONE (for GAY sarah saavedra; isreal MET flex sonlogan) inhalation is a combination of two medicines that decrease inflammation and help to open up the airways in your lungs. It is used to treat asthma. Do NOT use in an acute asthma attack.  How should I use this medicine?  This medicine is inhaled through the mouth. Follow the directions on the prescription label. Rinse your mouth with water after use. Make sure not to swallow the water. Take your medicine at regular intervals. Do not take your medicine more often than directed. Do not stop taking except on your doctor's advice. Make sure that you are using your inhaler correctly. Ask your doctor or health care provider if you have any questions.  A special MedGuide will be given to you by the pharmacist with each prescription and refill. Be sure to read this information carefully each time.  Talk to your pediatrician regarding the use of this medicine in children. Special care may be needed.  What side effects may I notice from receiving this medicine?  Side effects that you should report to your doctor or health care professional as soon as possible:  · allergic reactions like skin rash or hives, swelling of the face, lips, or tongue  · breathing problems  · chest pain  · dizziness or lightheaded  · fever or chills  · high blood pressure  · irregular heartbeat  · vision problems  Side effects that usually do not require medical attention (Report these to your doctor or health care professional if they continue or are bothersome.):  · coughing, hoarseness, throat  irritation  · different taste in mouth  · headache  · nervousness  · stomach upset  · stuffy nose  · tremor  What may interact with this medicine?  Do not take this medicine with any of the following mediations:  · MAOIs like Carbex, Eldepryl, Marplan, Nardil, and Parnate  This medicine may also interact with the following medications:  · aminophylline or theophylline  · antiviral medicines for HIV or AIDS  · certain antibiotics like clarithromycin, linezolid, and telithromycin  · certain medicines for blood pressure, heart disease, or irregular heart beat  · certain medicines for colds  · certain medicines for depression or emotional conditions  · certain medicines for fungal infections like ketoconazole and itraconazole  · diuretics  · other medicines for breathing problems  What if I miss a dose?  If you miss a dose, use it as soon as you remember. If it is almost time for your next dose, use only that dose and continue with your regular schedule, spacing doses evenly. Do not use double or extra doses.  Where should I keep my medicine?  Keep out of the reach of children.  Store at room temperature between 59 and 86 degrees F (15 and 30 degrees C). Throw away the inhaler after the dose counter reaches 0 or after the expiration date, whichever comes first. Avoid exposure to heat, fire, and flame.  What should I tell my health care provider before I take this medicine?  They need to know if you have any of these conditions:  · adrenal tumor  · aneurysm  · bone problems  · diabetes  · glaucoma  · heart disease or irregular heartbeat  · high blood pressure  · immune system problems  · infection  · seizures  · thyroid problems  · worsening asthma  · an unusual or allergic reaction to formoterol, mometasone, other medicines, foods, dyes, or preservatives  · pregnant or trying to get pregnant  · breast-feeding  What should I watch for while using this medicine?  Visit your doctor for regular check ups. Tell your doctor  or health care professional if your symptoms do not get better. If your symptoms get worse or if you need your short-acting inhalers more often, call your doctor right away. Do not use this medicine more than every 12 hours.  If you have asthma, be aware that using this medicine may increase your risk of dying from asthma-related problems. Talk to your doctor about the risks and benefits of taking this medicine. NEVER use this medicine for an acute asthma attack.  This medicine may increase your risk of getting an infection. Tell your doctor or health care professional if you are around anyone with measles or chickenpox, or if you develop sores or blisters that do not heal properly.  NOTE:This sheet is a summary. It may not cover all possible information. If you have questions about this medicine, talk to your doctor, pharmacist, or health care provider. Copyright© 2017 [employer]

## 2019-03-28 NOTE — PROGRESS NOTES
Subjective:       Patient ID: Clive Guzman is a 63 y.o. male.    Chief Complaint: Cough (coughing up green phlegm for three to four months)    HPI  Review of Systems    Objective:      Physical Exam    Medication List with Changes/Refills   New Medications    MOMETASONE-FORMOTEROL (DULERA) 100-5 MCG/ACTUATION HFAA    Inhale 2 puffs into the lungs 2 (two) times daily. Controller   Current Medications    ALBUTEROL (VENTOLIN HFA) 90 MCG/ACTUATION INHALER    Inhale 2 puffs into the lungs every 6 (six) hours as needed for Wheezing. Rescue    ASPIRIN (ASPIR-81) 81 MG EC TABLET    Take 1 tablet by mouth Daily. Every day    BLOOD SUGAR DIAGNOSTIC STRP    One touch test strips    DAILY testing    CHOLECALCIFEROL, VITAMIN D3, 1,000 UNIT CAPSULE    Take 1 capsule by mouth Daily. Every day    FENOFIBRATE 160 MG TAB    TAKE 1 TABLET BY MOUTH EVERY DAY    LANCETS 33 GAUGE MISC    1 lancet by Misc.(Non-Drug; Combo Route) route once daily.    MAGNESIUM OXIDE-MG AA CHELATE 133 MG TAB    Take 1 tablet by mouth.    METFORMIN (GLUCOPHAGE) 500 MG TABLET    TAKE 1 TABLET BY MOUTH TWICE DAILY, WITH MEALS    NADOLOL (CORGARD) 20 MG TABLET    TAKE 1 TABLET BY MOUTH EVERY DAY    OMEPRAZOLE (PRILOSEC) 40 MG CAPSULE    TAKE ONE CAPSULE BY MOUTH EVERY DAY    TRUE METRIX GLUCOSE TEST STRIP STRP    USE TO TEST BLOOD SUGAR AT LEAST ONCE DAILY    ZOLMITRIPTAN (ZOMIG) 5 MG TABLET    TAKE 1 TABLET(5 MG) BY MOUTH TWICE DAILY AS NEEDED FOR MIGRAINE    ZOMIG 5 MG SPRY    USE 1 SPRAY NASALLY ONCE DAILY AS NEEDED   Discontinued Medications    DOXYCYCLINE (VIBRAMYCIN) 100 MG CAP    Take 1 capsule (100 mg total) by mouth 2 (two) times daily.    DYMISTA 137-50 MCG/SPRAY SPRY         Results for orders placed or performed in visit on 06/05/18   Hemoglobin A1c   Result Value Ref Range    Hemoglobin A1C 5.9 (H) 4.0 - 5.6 %    Estimated Avg Glucose 123 68 - 131 mg/dL   Vitamin D   Result Value Ref Range    Vit D, 25-Hydroxy 32 30 - 96 ng/mL   TSH   Result  Value Ref Range    TSH 2.090 0.400 - 4.000 uIU/mL   Comprehensive metabolic panel   Result Value Ref Range    Sodium 138 136 - 145 mmol/L    Potassium 4.0 3.5 - 5.1 mmol/L    Chloride 104 95 - 110 mmol/L    CO2 25 23 - 29 mmol/L    Glucose 118 (H) 70 - 110 mg/dL    BUN, Bld 17 8 - 23 mg/dL    Creatinine 0.9 0.5 - 1.4 mg/dL    Calcium 9.7 8.7 - 10.5 mg/dL    Total Protein 7.4 6.0 - 8.4 g/dL    Albumin 3.9 3.5 - 5.2 g/dL    Total Bilirubin 0.5 0.1 - 1.0 mg/dL    Alkaline Phosphatase 34 (L) 55 - 135 U/L    AST 20 10 - 40 U/L    ALT 33 10 - 44 U/L    Anion Gap 9 8 - 16 mmol/L    eGFR if African American >60 >60 mL/min/1.73 m^2    eGFR if non African American >60 >60 mL/min/1.73 m^2     Assessment:       1. Mixed simple and mucopurulent chronic bronchitis    2. Chronic cough    3. Environmental and seasonal allergies        Plan:       Mixed simple and mucopurulent chronic bronchitis  -     mometasone-formoterol (DULERA) 100-5 mcg/actuation HFAA; Inhale 2 puffs into the lungs 2 (two) times daily. Controller  Dispense: 13 g; Refill: 1  -     X-Ray Chest PA And Lateral; Future; Expected date: 03/28/2019    Chronic cough  -     mometasone-formoterol (DULERA) 100-5 mcg/actuation HFAA; Inhale 2 puffs into the lungs 2 (two) times daily. Controller  Dispense: 13 g; Refill: 1  -     X-Ray Chest PA And Lateral; Future; Expected date: 03/28/2019    Environmental and seasonal allergies  -     mometasone-formoterol (DULERA) 100-5 mcg/actuation HFAA; Inhale 2 puffs into the lungs 2 (two) times daily. Controller  Dispense: 13 g; Refill: 1  -     X-Ray Chest PA And Lateral; Future; Expected date: 03/28/2019          Follow up if symptoms worsen or fail to improve, for We will call you with chest xray results .    If symptoms worsen patient may call for ASAP appointment or report to the emergency department for further evaluation.       I have reviewed the patient's past medical/surgical and social histories and updated as appropriate.  Medications were reviewed and discussed as appropriate including side effects and risks versus benefit. Plan of care was reviewed and agreed upon with the patient.  An opportunity to ask questions was provided and explanation given. Patient verbalized understanding on all information reviewed and discussed.

## 2019-03-28 NOTE — PROGRESS NOTES
Subjective:       Patient ID: Clive Guzman is a 63 y.o. male.    Chief Complaint: Cough (coughing up green phlegm for three to four months)    Patient is a 63-year-old male presents to clinic today with complaints of ongoing chronic cough for the past 3-4 months.  He reports that he has no known allergies and was previously seen by allergist in [place] Dr. Sharif and was prescribed Dymista and an antihistamine however, he is not currently taking a daily antihistamine and is not using his Dymista regularly.  He reports that his cough is mucopurulent at times and at other times it is a clear sputum.  He wheezes intermittently.  He is not using his albuterol inhaler regularly.  He states the wheezing clears after he coughs the sputum up.  He was seen by his primary care provider on February 26, 2019 and was prescribed a five-day outpatient burst of prednisone as well as doxycycline.  He saw his sleep specialist the following week and was instructed to utilize his Dymista and antihistamine.  He reports that his cough is waxing and waning and would like a chest x-ray.  He denies any fever, nasal congestion, sinus pain or pressure, sore throat, nausea/vomiting/diarrhea, abdominal pain, rash, or any other acute symptom or complaint.  He has known chronic back pain and this is without change.  He has known diabetes type 2 and it is currently well controlled on his present medication regimen.  Last hemoglobin A1c was less than 7.0.    Review of Systems   Constitutional: Negative.    HENT: Negative.    Eyes: Negative.    Respiratory: Positive for apnea (has known sleep apnea and uses CPAP machine with distilled water ), cough and wheezing (intermittent ).    Cardiovascular: Negative.  Negative for leg swelling.   Gastrointestinal: Negative.    Musculoskeletal: Positive for back pain. Negative for myalgias.   Skin: Negative.  Negative for color change, pallor and rash.   Allergic/Immunologic: Positive for environmental  allergies. Negative for food allergies and immunocompromised state.   Neurological: Negative for dizziness and headaches.   Hematological: Negative.    All other systems reviewed and are negative.      Objective:      Physical Exam   Constitutional: He is oriented to person, place, and time. He appears well-developed and well-nourished. No distress.   HENT:   Head: Normocephalic and atraumatic.   Right Ear: Tympanic membrane, external ear and ear canal normal.   Left Ear: Tympanic membrane, external ear and ear canal normal.   Nose: Nose normal.   Mouth/Throat: Uvula is midline, oropharynx is clear and moist and mucous membranes are normal.   Eyes: Conjunctivae are normal. Right eye exhibits no discharge. Left eye exhibits no discharge. No scleral icterus.   Neck: Normal range of motion. Neck supple. No thyromegaly present.   Cardiovascular: Normal rate, regular rhythm, normal heart sounds and intact distal pulses.   Pulmonary/Chest: Effort normal and breath sounds normal. No respiratory distress. He has no wheezes. He has no rales.   Abdominal: Soft. He exhibits no distension.   Musculoskeletal: Normal range of motion.   Lymphadenopathy:     He has no cervical adenopathy.   Neurological: He is alert and oriented to person, place, and time.   Skin: Skin is warm and dry. Capillary refill takes less than 2 seconds. He is not diaphoretic. No pallor.   Psychiatric: He has a normal mood and affect. His behavior is normal.   Nursing note and vitals reviewed.      Harsh hacking cough in clinic - nonproductive     Medication List with Changes/Refills   New Medications    MOMETASONE-FORMOTEROL (DULERA) 100-5 MCG/ACTUATION HFAA    Inhale 2 puffs into the lungs 2 (two) times daily. Controller   Current Medications    ALBUTEROL (VENTOLIN HFA) 90 MCG/ACTUATION INHALER    Inhale 2 puffs into the lungs every 6 (six) hours as needed for Wheezing. Rescue    ASPIRIN (ASPIR-81) 81 MG EC TABLET    Take 1 tablet by mouth Daily. Every  day    BLOOD SUGAR DIAGNOSTIC STRP    One touch test strips    DAILY testing    CHOLECALCIFEROL, VITAMIN D3, 1,000 UNIT CAPSULE    Take 1 capsule by mouth Daily. Every day    FENOFIBRATE 160 MG TAB    TAKE 1 TABLET BY MOUTH EVERY DAY    LANCETS 33 GAUGE MISC    1 lancet by Misc.(Non-Drug; Combo Route) route once daily.    MAGNESIUM OXIDE-MG AA CHELATE 133 MG TAB    Take 1 tablet by mouth.    METFORMIN (GLUCOPHAGE) 500 MG TABLET    TAKE 1 TABLET BY MOUTH TWICE DAILY, WITH MEALS    NADOLOL (CORGARD) 20 MG TABLET    TAKE 1 TABLET BY MOUTH EVERY DAY    OMEPRAZOLE (PRILOSEC) 40 MG CAPSULE    TAKE ONE CAPSULE BY MOUTH EVERY DAY    TRUE METRIX GLUCOSE TEST STRIP STRP    USE TO TEST BLOOD SUGAR AT LEAST ONCE DAILY    ZOLMITRIPTAN (ZOMIG) 5 MG TABLET    TAKE 1 TABLET(5 MG) BY MOUTH TWICE DAILY AS NEEDED FOR MIGRAINE    ZOMIG 5 MG SPRY    USE 1 SPRAY NASALLY ONCE DAILY AS NEEDED   Discontinued Medications    DOXYCYCLINE (VIBRAMYCIN) 100 MG CAP    Take 1 capsule (100 mg total) by mouth 2 (two) times daily.    DYMISTA 137-50 MCG/SPRAY SPRY         Assessment:       1. Mixed simple and mucopurulent chronic bronchitis    2. Chronic cough    3. Environmental and seasonal allergies        Plan:       Mixed simple and mucopurulent chronic bronchitis  -     mometasone-formoterol (DULERA) 100-5 mcg/actuation HFAA; Inhale 2 puffs into the lungs 2 (two) times daily. Controller  Dispense: 13 g; Refill: 1  -     X-Ray Chest PA And Lateral; Future; Expected date: 03/28/2019  Will obtain chest x-ray and call the patient if there are any acutely abnormal results.  I will provide him with a short trial of a Dulera inhaler to 8 in further symptom relief.  He may use his albuterol inhaler for any wheezing in between his Dulera use.  He was instructed to obtain antihistamine an uses daily.  He is to avoid any known allergy triggers.  If his symptoms do not cristiana we did discuss that he may need to go back to his ENT/allergist or obtain a referral  for pulmonology to undergo PFTs.  He may use Mucinex DM to further aid in symptom relief.  Increase oral fluids.  Follow up with primary care provider as scheduled.  He reports he is scheduled to see Dr. Anand the near future.  Chronic cough  -     mometasone-formoterol (DULERA) 100-5 mcg/actuation HFAA; Inhale 2 puffs into the lungs 2 (two) times daily. Controller  Dispense: 13 g; Refill: 1  -     X-Ray Chest PA And Lateral; Future; Expected date: 03/28/2019    Environmental and seasonal allergies  -     mometasone-formoterol (DULERA) 100-5 mcg/actuation HFAA; Inhale 2 puffs into the lungs 2 (two) times daily. Controller  Dispense: 13 g; Refill: 1  -     X-Ray Chest PA And Lateral; Future; Expected date: 03/28/2019          Follow up if symptoms worsen or fail to improve, for We will call you with chest xray results .    If symptoms worsen patient may call for ASAP appointment or report to the emergency department for further evaluation.       I have reviewed the patient's past medical/surgical and social histories and updated as appropriate. Medications were reviewed and discussed as appropriate including side effects and risks versus benefit. Plan of care was reviewed and agreed upon with the patient.  An opportunity to ask questions was provided and explanation given. Patient verbalized understanding on all information reviewed and discussed.

## 2019-03-29 DIAGNOSIS — R05.3 PERSISTENT COUGH FOR 3 WEEKS OR LONGER: Primary | ICD-10-CM

## 2019-03-29 DIAGNOSIS — J41.8 MIXED SIMPLE AND MUCOPURULENT CHRONIC BRONCHITIS: ICD-10-CM

## 2019-04-01 ENCOUNTER — CLINICAL SUPPORT (OUTPATIENT)
Dept: OPHTHALMOLOGY | Facility: CLINIC | Age: 64
End: 2019-04-01
Payer: COMMERCIAL

## 2019-04-01 ENCOUNTER — OFFICE VISIT (OUTPATIENT)
Dept: OPHTHALMOLOGY | Facility: CLINIC | Age: 64
End: 2019-04-01
Payer: COMMERCIAL

## 2019-04-01 DIAGNOSIS — H40.003 OPEN ANGLE GLAUCOMA WITH BORDERLINE INTRAOCULAR PRESSURE, BILATERAL: ICD-10-CM

## 2019-04-01 DIAGNOSIS — H40.003 OPEN ANGLE GLAUCOMA WITH BORDERLINE INTRAOCULAR PRESSURE, BILATERAL: Primary | ICD-10-CM

## 2019-04-01 DIAGNOSIS — H52.7 REFRACTIVE ERROR: ICD-10-CM

## 2019-04-01 DIAGNOSIS — E11.9 DIABETES MELLITUS TYPE 2 WITHOUT RETINOPATHY: ICD-10-CM

## 2019-04-01 PROCEDURE — 92133 POSTERIOR SEGMENT OCT OPTIC NERVE(OCULAR COHERENCE TOMOGRAPHY) - OU - BOTH EYES: ICD-10-PCS | Mod: S$GLB,,, | Performed by: OPHTHALMOLOGY

## 2019-04-01 PROCEDURE — 92133 CPTRZD OPH DX IMG PST SGM ON: CPT | Mod: S$GLB,,, | Performed by: OPHTHALMOLOGY

## 2019-04-01 PROCEDURE — 99999 PR PBB SHADOW E&M-EST. PATIENT-LVL III: ICD-10-PCS | Mod: PBBFAC,,, | Performed by: OPHTHALMOLOGY

## 2019-04-01 PROCEDURE — 99999 PR PBB SHADOW E&M-EST. PATIENT-LVL I: CPT | Mod: PBBFAC,,,

## 2019-04-01 PROCEDURE — 92083 HUMPHREY VISUAL FIELD - OU - BOTH EYES: ICD-10-PCS | Mod: S$GLB,,, | Performed by: OPHTHALMOLOGY

## 2019-04-01 PROCEDURE — 92012 PR EYE EXAM, EST PATIENT,INTERMED: ICD-10-PCS | Mod: S$GLB,,, | Performed by: OPHTHALMOLOGY

## 2019-04-01 PROCEDURE — 92083 EXTENDED VISUAL FIELD XM: CPT | Mod: S$GLB,,, | Performed by: OPHTHALMOLOGY

## 2019-04-01 PROCEDURE — 92012 INTRM OPH EXAM EST PATIENT: CPT | Mod: S$GLB,,, | Performed by: OPHTHALMOLOGY

## 2019-04-01 PROCEDURE — 99999 PR PBB SHADOW E&M-EST. PATIENT-LVL I: ICD-10-PCS | Mod: PBBFAC,,,

## 2019-04-01 PROCEDURE — 99999 PR PBB SHADOW E&M-EST. PATIENT-LVL III: CPT | Mod: PBBFAC,,, | Performed by: OPHTHALMOLOGY

## 2019-04-01 NOTE — PROGRESS NOTES
HPI     64 YO male presents today for an IOP check and HVF. He states that he is   doing well, no problems or complaints.     Lab Results       Component                Value               Date                       HGBA1C                   5.9 (H)             06/05/2018                 HGBA1C                   5.7 (H)             01/27/2018                 HGBA1C                   6.0                 03/11/2017            No results found for: LABA1C    Last edited by Florinda Prabhakar, PCT on 4/1/2019  3:30 PM. (History)            Assessment /Plan     For exam results, see Encounter Report.    Open angle glaucoma with borderline intraocular pressure, bilateral    Diabetes mellitus type 2 without retinopathy    Refractive error            IOP a little high, C:D ratio asymmetry noted, although it appears it was there in 2015 on OCT nerve also, with OU borderline temporal at that time. Last  HRT shows borderline thinning OS, WNL OD.  CCT thick - 608//620  Gonio - open to SS OD, SS/CB OS  HVF WNL OU  OCT nerve remains borderline T OD, NI  RTC 4 months for IOP check.    Likely migraine aura, does not exceed 30 minutes    Maintain glucose control. Annual DFE recommended    MRx given prior visit.

## 2019-04-16 ENCOUNTER — OFFICE VISIT (OUTPATIENT)
Dept: ALLERGY | Facility: CLINIC | Age: 64
End: 2019-04-16
Payer: COMMERCIAL

## 2019-04-16 VITALS
WEIGHT: 239 LBS | BODY MASS INDEX: 36.22 KG/M2 | SYSTOLIC BLOOD PRESSURE: 118 MMHG | DIASTOLIC BLOOD PRESSURE: 64 MMHG | HEIGHT: 68 IN

## 2019-04-16 DIAGNOSIS — R05.3 CHRONIC COUGH: Primary | ICD-10-CM

## 2019-04-16 DIAGNOSIS — G43.809 OTHER MIGRAINE WITHOUT STATUS MIGRAINOSUS, NOT INTRACTABLE: ICD-10-CM

## 2019-04-16 DIAGNOSIS — Z79.899 CURRENT USE OF BETA BLOCKER: ICD-10-CM

## 2019-04-16 DIAGNOSIS — J31.0 CHRONIC RHINITIS: ICD-10-CM

## 2019-04-16 PROBLEM — G43.909 MIGRAINE WITHOUT STATUS MIGRAINOSUS, NOT INTRACTABLE: Status: ACTIVE | Noted: 2019-04-16

## 2019-04-16 PROCEDURE — 3008F PR BODY MASS INDEX (BMI) DOCUMENTED: ICD-10-PCS | Mod: ,,, | Performed by: ALLERGY & IMMUNOLOGY

## 2019-04-16 PROCEDURE — 3008F BODY MASS INDEX DOCD: CPT | Mod: ,,, | Performed by: ALLERGY & IMMUNOLOGY

## 2019-04-16 PROCEDURE — 99204 OFFICE O/P NEW MOD 45 MIN: CPT | Mod: ,,, | Performed by: ALLERGY & IMMUNOLOGY

## 2019-04-16 PROCEDURE — 99204 PR OFFICE/OUTPT VISIT, NEW, LEVL IV, 45-59 MIN: ICD-10-PCS | Mod: ,,, | Performed by: ALLERGY & IMMUNOLOGY

## 2019-04-16 NOTE — PROGRESS NOTES
"Subjective:       Patient ID: Clive Guzman is a 63 y.o. male.    Chief Complaint: Allergies (post nasal drip, daily sneezing, runny nose, cough/clear throat often due to mucous in throat, itchy/watery eyes, feels like ears are closed, can hear self wheeze on occasion)    HPI     Pt presents as a new patient for rhinitis and cough.     Rhinitis:  Seasonal: late winter , early spring   Sx: mucus, sneezing, RN, ear pressure, PND  Tx: mucinex, house filters , H1 first gen, allegra, claritin, zyrtec, dymista- uses when symptoms are bad. 1 sen prn.    Morning is worse time of day   Trigger: cat   Allergy testin yrs ago.   [place]   - would like to be back on AIT. On beta blocker. He takes this for migraine. He will be able to hold x 48 hours per report.    Cough:  Onset: adulthood  Sx: wheezing, productive cough    Prior dx as bronchitis.   Tx: dulera 100 - haven't used it yet.   Seasonal trigger: spring   Rescue: doesn't use one. < 2 days per week.     Does wear a cpap.     Pets: cat in the house but is now out of the house.     Works at [employer] at the [email or web address].       Review of Systems      General: neg unexpected weight changes, fevers, chills, night sweats, malaise  HEENT: see hpi, Neg eye pain, vision changes, ear drainage, nose bleeds, throat tightness, sores in the mouth  CV: Neg chest pain, palpitations, swelling  Resp: see hpi, neg shortness of breath, hemoptysis  GI: see hpi, neg dysphagia, night abdominal pain, reflux, chronic diarrhea, chronic constipation  Derm: See Hpi, neg new rash, neg flushing  Mu/sk: Neg joint pain, joint swelling   Psych: Neg anxiety  neuro: neg chronic headaches, muscle weakness  Endo: neg heat/cold intolerance, chronic fatigue    Objective:     Vitals:    19 0906   BP: 118/64   Weight: 108.4 kg (239 lb)   Height: 5' 8" (1.727 m)   PF: 480 L/min        Physical Exam      General: no acute distress, well developed well nourished   HEENT: "   Head:normocephalic atraumatic  Eyes: KALI, EOMI, Neg injection, scleral icterus, or conjunctival papillary hypertrophy.  Ears: tm clear bilaterally, normal canal  Nose: 2-3+ inferior turbinates pink, neg nasal polyps            Mucosa: moist            Septal irritation: none   OP: mucus membranes moist, - cobblestoning, - PND, neg erythema or lesions  Neck: supple, Full range of motion, neg lymphadenopathy  Chest: full respiratory excursion no abnormal chest abnormality  Resp: clear to ascultation bilaterally  CV: RRR, neg MRG, brisk capillary refill  Abdomen: BS+, non tender, non distended  Ext:  Neg clubbing, cyanosis, pitting edema  Skin: Neg rashes or lesions  Lymph: neg supraclavicular, axillary     Assessment:       1. Chronic cough    2. Chronic rhinitis    3. Other migraine without status migrainosus, not intractable    4. Current use of beta blocker        Plan:       Chronic cough    Chronic rhinitis    Other migraine without status migrainosus, not intractable    Current use of beta blocker    pt would like to start AIT.   Discussed risks and benefits  Skin prick testing procedure day  Pt to bring me paper okaying use of [place]it in [place] prior to giving extracts.     Continue dymista and dulera 100 instruction given .    Instruction for skin prick testing given.         Elba Underwood M.D.  Allergy/Immunology  [facility]   [phone] phone  [phone] fax

## 2019-04-16 NOTE — PATIENT INSTRUCTIONS
Nose:   Saline- arm and hammer simply saline.  1-2 times per day before dymista.   Use like you swim. Blow it out.     dymista = fluticasone and azelastine     dymista - 1 spray per nare twice per day   When season is over, may stop completely.     Technique:  Head down   Aim up and out   [place] don't sniff  Pat dry then lift head     Lung:  dulera 1 puff twice per day - brush teeth and gargle after use.   Use this for at least 2-3 months.   Shake inhaler x 10 seconds  Breathe out all air  Puff the inhaler and breathe in over 5 seconds  Hold breath x 10 seconds and exhale through nose.     Instructions For Skin Testing    Please HOLD all antihistamines (Benadryl, zyrtec, Claritin, loratidine, cetirizine, diphenhydramine, medications with pm in the name, Allegra, fexofenadine) 7 days prior to testing.     Please HOLD zantac, ranitidine, pepsid, famotidine 3 days prior to your testing.     Please HOLD azelastine, astelin, astepro, dymista three days prior to your skin testing    Please HOLD your Beta Blocker (ask the office if you are on one.) These medicines typically end in olol. HOLD 48 hours.    Please HOLD clonidine the morning of skin testing.     Please HOLD any Tricyclic antidepressants 14 days prior to skin testing. Please consult your prescribing doctor prior to discontinuing this medication.     Please HOLD Seroquel 14 days prior to skin testing. Please consult your prescribing physician prior to stopping this medication.     After skin testing, you may resume taking your HELD medications.     You may CONTINUE Montelukast , Flonase, Fluticasone, Nasonex, or other intranasal steroid.       Follow up in 6 weeks    Skin prick test procedure.

## 2019-04-18 ENCOUNTER — PATIENT OUTREACH (OUTPATIENT)
Dept: ADMINISTRATIVE | Facility: HOSPITAL | Age: 64
End: 2019-04-18

## 2019-04-22 ENCOUNTER — TELEPHONE (OUTPATIENT)
Dept: FAMILY MEDICINE | Facility: CLINIC | Age: 64
End: 2019-04-22

## 2019-04-29 ENCOUNTER — TELEPHONE (OUTPATIENT)
Dept: SPINE | Facility: CLINIC | Age: 64
End: 2019-04-29

## 2019-05-01 ENCOUNTER — DOCUMENTATION ONLY (OUTPATIENT)
Dept: FAMILY MEDICINE | Facility: CLINIC | Age: 64
End: 2019-05-01

## 2019-05-01 NOTE — PROGRESS NOTES
Pre-Visit Chart Review  For Appointment Scheduled on 5/2/19    Health Maintenance Due   Topic Date Due    TETANUS VACCINE  06/06/1973    Low Dose Statin  06/06/1976    Foot Exam  10/16/2018    Hemoglobin A1c  12/05/2018    Lipid Panel  01/27/2019    Urine Microalbumin  01/27/2019

## 2019-05-02 ENCOUNTER — OFFICE VISIT (OUTPATIENT)
Dept: FAMILY MEDICINE | Facility: CLINIC | Age: 64
End: 2019-05-02
Payer: COMMERCIAL

## 2019-05-02 VITALS
HEART RATE: 57 BPM | SYSTOLIC BLOOD PRESSURE: 109 MMHG | BODY MASS INDEX: 36.42 KG/M2 | HEIGHT: 68 IN | DIASTOLIC BLOOD PRESSURE: 61 MMHG | WEIGHT: 240.31 LBS | TEMPERATURE: 98 F

## 2019-05-02 DIAGNOSIS — E11.9 TYPE 2 DIABETES MELLITUS WITHOUT COMPLICATION, WITHOUT LONG-TERM CURRENT USE OF INSULIN: Primary | ICD-10-CM

## 2019-05-02 DIAGNOSIS — E78.5 HYPERLIPIDEMIA, UNSPECIFIED HYPERLIPIDEMIA TYPE: ICD-10-CM

## 2019-05-02 DIAGNOSIS — G43.909 MIGRAINE WITHOUT STATUS MIGRAINOSUS, NOT INTRACTABLE, UNSPECIFIED MIGRAINE TYPE: ICD-10-CM

## 2019-05-02 DIAGNOSIS — J31.0 CHRONIC RHINITIS: ICD-10-CM

## 2019-05-02 PROCEDURE — 99396 PREV VISIT EST AGE 40-64: CPT | Mod: S$GLB,,, | Performed by: FAMILY MEDICINE

## 2019-05-02 PROCEDURE — 99396 PR PREVENTIVE VISIT,EST,40-64: ICD-10-PCS | Mod: S$GLB,,, | Performed by: FAMILY MEDICINE

## 2019-05-02 PROCEDURE — 3044F PR MOST RECENT HEMOGLOBIN A1C LEVEL <7.0%: ICD-10-PCS | Mod: CPTII,S$GLB,, | Performed by: FAMILY MEDICINE

## 2019-05-02 PROCEDURE — 3044F HG A1C LEVEL LT 7.0%: CPT | Mod: CPTII,S$GLB,, | Performed by: FAMILY MEDICINE

## 2019-05-02 PROCEDURE — 99999 PR PBB SHADOW E&M-EST. PATIENT-LVL IV: CPT | Mod: PBBFAC,,, | Performed by: FAMILY MEDICINE

## 2019-05-02 PROCEDURE — 99999 PR PBB SHADOW E&M-EST. PATIENT-LVL IV: ICD-10-PCS | Mod: PBBFAC,,, | Performed by: FAMILY MEDICINE

## 2019-05-03 NOTE — PROGRESS NOTES
Subjective:   Patient ID: Clive Guzman is a 63 y.o. male     Chief Complaint:Establish Care      Patient here for annual checkup and establish care.  Patient doing well.    Review of Systems   Respiratory: Negative for shortness of breath.    Cardiovascular: Negative for chest pain.   Gastrointestinal: Negative for abdominal pain.   Genitourinary: Negative for dysuria.     Past Medical History:   Diagnosis Date    Diabetes mellitus, type 2     GERD (gastroesophageal reflux disease)     Hyperlipidemia     Migraines     NAFLD (nonalcoholic fatty liver disease)     Obstructive sleep apnea on CPAP     @ 13 cm/H2)    Rash and other nonspecific skin eruption 8/20/2012     Objective:     Vitals:    05/02/19 1255   BP: 109/61   Pulse: (!) 57   Temp: 98.4 °F (36.9 °C)     Body mass index is 36.54 kg/m².  Physical Exam   Constitutional: No distress.   HENT:   Head: Normocephalic and atraumatic.   Eyes: EOM are normal.   Cardiovascular: Normal rate and normal heart sounds.   Pulses:       Dorsalis pedis pulses are 2+ on the right side, and 2+ on the left side.        Posterior tibial pulses are 2+ on the right side, and 2+ on the left side.   Pulmonary/Chest: Effort normal.   Abdominal: Bowel sounds are normal.   Musculoskeletal: Normal range of motion.        Right foot: There is normal range of motion and no deformity.        Left foot: There is normal range of motion and no deformity.   Feet:   Right Foot:   Protective Sensation: 4 sites tested. 4 sites sensed.   Skin Integrity: Negative for ulcer or blister.   Left Foot:   Protective Sensation: 4 sites tested. 4 sites sensed.   Skin Integrity: Negative for ulcer or blister.   Neurological: He is alert.   Psychiatric: He has a normal mood and affect.     Assessment:     1. Type 2 diabetes mellitus without complication, without long-term current use of insulin    2. Migraine without status migrainosus, not intractable, unspecified migraine type      Plan:    Type 2 diabetes mellitus without complication, without long-term current use of insulin  -     Hemoglobin A1c; Future; Expected date: 05/02/2019  -     Lipid panel; Future; Expected date: 05/02/2019  -     Microalbumin/creatinine urine ratio; Future; Expected date: 05/02/2019  -     Comprehensive metabolic panel; Future; Expected date: 05/02/2019    Migraine without status migrainosus, not intractable, unspecified migraine type  Stable on nadolol  Hyperlipidemia, unspecified hyperlipidemia type  Patient hyperlipidemia which currently stable on fenofibrate.  Chronic rhinitis  Stable    Otherwise patient is healthy and is currently up-to-date.  Recommend follow-up in 6 months for repeat blood work    Time spent with patient: 30 minutes and over half of that time was spent on counseling an coordination of care.    Simone Amezquita MD  05/03/2019    Portions of this note have been dictated with JEANNA Ellison

## 2019-05-04 ENCOUNTER — LAB VISIT (OUTPATIENT)
Dept: LAB | Facility: HOSPITAL | Age: 64
End: 2019-05-04
Attending: FAMILY MEDICINE
Payer: COMMERCIAL

## 2019-05-04 DIAGNOSIS — E11.9 TYPE 2 DIABETES MELLITUS WITHOUT COMPLICATION, WITHOUT LONG-TERM CURRENT USE OF INSULIN: ICD-10-CM

## 2019-05-04 LAB
ALBUMIN SERPL BCP-MCNC: 3.8 G/DL (ref 3.5–5.2)
ALP SERPL-CCNC: 36 U/L (ref 55–135)
ALT SERPL W/O P-5'-P-CCNC: 26 U/L (ref 10–44)
ANION GAP SERPL CALC-SCNC: 7 MMOL/L (ref 8–16)
AST SERPL-CCNC: 18 U/L (ref 10–40)
BILIRUB SERPL-MCNC: 0.6 MG/DL (ref 0.1–1)
BUN SERPL-MCNC: 19 MG/DL (ref 8–23)
CALCIUM SERPL-MCNC: 9.8 MG/DL (ref 8.7–10.5)
CHLORIDE SERPL-SCNC: 104 MMOL/L (ref 95–110)
CHOLEST SERPL-MCNC: 204 MG/DL (ref 120–199)
CHOLEST/HDLC SERPL: 4.2 {RATIO} (ref 2–5)
CO2 SERPL-SCNC: 28 MMOL/L (ref 23–29)
CREAT SERPL-MCNC: 1 MG/DL (ref 0.5–1.4)
EST. GFR  (AFRICAN AMERICAN): >60 ML/MIN/1.73 M^2
EST. GFR  (NON AFRICAN AMERICAN): >60 ML/MIN/1.73 M^2
ESTIMATED AVG GLUCOSE: 126 MG/DL (ref 68–131)
GLUCOSE SERPL-MCNC: 122 MG/DL (ref 70–110)
HBA1C MFR BLD HPLC: 6 % (ref 4–5.6)
HDLC SERPL-MCNC: 49 MG/DL (ref 40–75)
HDLC SERPL: 24 % (ref 20–50)
LDLC SERPL CALC-MCNC: 134.2 MG/DL (ref 63–159)
NONHDLC SERPL-MCNC: 155 MG/DL
POTASSIUM SERPL-SCNC: 4.2 MMOL/L (ref 3.5–5.1)
PROT SERPL-MCNC: 7.3 G/DL (ref 6–8.4)
SODIUM SERPL-SCNC: 139 MMOL/L (ref 136–145)
TRIGL SERPL-MCNC: 104 MG/DL (ref 30–150)

## 2019-05-04 PROCEDURE — 36415 COLL VENOUS BLD VENIPUNCTURE: CPT | Mod: PO

## 2019-05-04 PROCEDURE — 80061 LIPID PANEL: CPT

## 2019-05-04 PROCEDURE — 83036 HEMOGLOBIN GLYCOSYLATED A1C: CPT

## 2019-05-04 PROCEDURE — 80053 COMPREHEN METABOLIC PANEL: CPT

## 2019-06-04 DIAGNOSIS — E78.5 HYPERLIPIDEMIA, UNSPECIFIED HYPERLIPIDEMIA TYPE: ICD-10-CM

## 2019-06-04 RX ORDER — FENOFIBRATE 160 MG/1
TABLET ORAL
Qty: 90 TABLET | Refills: 3 | Status: SHIPPED | OUTPATIENT
Start: 2019-06-04 | End: 2020-03-26

## 2019-06-19 DIAGNOSIS — K21.9 GASTROESOPHAGEAL REFLUX DISEASE, ESOPHAGITIS PRESENCE NOT SPECIFIED: ICD-10-CM

## 2019-06-19 DIAGNOSIS — E11.8 TYPE 2 DIABETES MELLITUS WITH COMPLICATION, WITHOUT LONG-TERM CURRENT USE OF INSULIN: ICD-10-CM

## 2019-06-19 DIAGNOSIS — G43.909 MIGRAINE WITHOUT STATUS MIGRAINOSUS, NOT INTRACTABLE, UNSPECIFIED MIGRAINE TYPE: ICD-10-CM

## 2019-06-20 RX ORDER — NADOLOL 20 MG/1
TABLET ORAL
Qty: 90 TABLET | Refills: 0 | Status: SHIPPED | OUTPATIENT
Start: 2019-06-20 | End: 2019-09-16 | Stop reason: SDUPTHER

## 2019-06-20 RX ORDER — METFORMIN HYDROCHLORIDE 500 MG/1
TABLET ORAL
Qty: 180 TABLET | Refills: 0 | Status: SHIPPED | OUTPATIENT
Start: 2019-06-20 | End: 2019-09-16 | Stop reason: SDUPTHER

## 2019-06-20 RX ORDER — OMEPRAZOLE 40 MG/1
CAPSULE, DELAYED RELEASE ORAL
Qty: 90 CAPSULE | Refills: 0 | Status: SHIPPED | OUTPATIENT
Start: 2019-06-20 | End: 2019-09-16 | Stop reason: SDUPTHER

## 2019-07-28 DIAGNOSIS — G43.909 MIGRAINE WITHOUT STATUS MIGRAINOSUS, NOT INTRACTABLE, UNSPECIFIED MIGRAINE TYPE: ICD-10-CM

## 2019-07-29 RX ORDER — ZOLMITRIPTAN 5 MG/1
SPRAY, METERED NASAL
Qty: 1 EACH | Refills: 2 | Status: SHIPPED | OUTPATIENT
Start: 2019-07-29 | End: 2020-04-13

## 2019-07-29 RX ORDER — ZOLMITRIPTAN 5 MG/1
TABLET, FILM COATED ORAL
Qty: 9 TABLET | Refills: 0 | Status: SHIPPED | OUTPATIENT
Start: 2019-07-29 | End: 2019-12-24

## 2019-09-16 DIAGNOSIS — G43.909 MIGRAINE WITHOUT STATUS MIGRAINOSUS, NOT INTRACTABLE, UNSPECIFIED MIGRAINE TYPE: ICD-10-CM

## 2019-09-16 DIAGNOSIS — E11.8 TYPE 2 DIABETES MELLITUS WITH COMPLICATION, WITHOUT LONG-TERM CURRENT USE OF INSULIN: ICD-10-CM

## 2019-09-16 DIAGNOSIS — K21.9 GASTROESOPHAGEAL REFLUX DISEASE, ESOPHAGITIS PRESENCE NOT SPECIFIED: ICD-10-CM

## 2019-09-17 RX ORDER — OMEPRAZOLE 40 MG/1
40 CAPSULE, DELAYED RELEASE ORAL EVERY MORNING
Qty: 90 CAPSULE | Refills: 0 | Status: SHIPPED | OUTPATIENT
Start: 2019-09-17 | End: 2019-12-13 | Stop reason: SDUPTHER

## 2019-09-17 RX ORDER — NADOLOL 20 MG/1
20 TABLET ORAL DAILY
Qty: 90 TABLET | Refills: 3 | Status: SHIPPED | OUTPATIENT
Start: 2019-09-17 | End: 2020-09-08

## 2019-09-17 RX ORDER — METFORMIN HYDROCHLORIDE 500 MG/1
500 TABLET ORAL 2 TIMES DAILY WITH MEALS
Qty: 180 TABLET | Refills: 3 | Status: SHIPPED | OUTPATIENT
Start: 2019-09-17 | End: 2020-09-08

## 2019-11-06 ENCOUNTER — DOCUMENTATION ONLY (OUTPATIENT)
Dept: FAMILY MEDICINE | Facility: CLINIC | Age: 64
End: 2019-11-06

## 2019-11-06 NOTE — PROGRESS NOTES
Pre-Visit Chart Review  For Appointment Scheduled on 11/7/19    Health Maintenance Due   Topic Date Due    TETANUS VACCINE  06/06/1973    Low Dose Statin  06/06/1976    Hemoglobin A1c  11/04/2019

## 2019-11-07 ENCOUNTER — OFFICE VISIT (OUTPATIENT)
Dept: FAMILY MEDICINE | Facility: CLINIC | Age: 64
End: 2019-11-07
Payer: COMMERCIAL

## 2019-11-07 VITALS
OXYGEN SATURATION: 96 % | BODY MASS INDEX: 35.59 KG/M2 | HEART RATE: 57 BPM | SYSTOLIC BLOOD PRESSURE: 114 MMHG | DIASTOLIC BLOOD PRESSURE: 68 MMHG | WEIGHT: 234.81 LBS | TEMPERATURE: 98 F | HEIGHT: 68 IN

## 2019-11-07 DIAGNOSIS — R06.01 ORTHOPNEA: ICD-10-CM

## 2019-11-07 DIAGNOSIS — K76.0 NAFLD (NONALCOHOLIC FATTY LIVER DISEASE): ICD-10-CM

## 2019-11-07 DIAGNOSIS — E78.5 HYPERLIPIDEMIA, UNSPECIFIED HYPERLIPIDEMIA TYPE: ICD-10-CM

## 2019-11-07 DIAGNOSIS — R06.02 SHORTNESS OF BREATH: ICD-10-CM

## 2019-11-07 DIAGNOSIS — E11.9 TYPE 2 DIABETES MELLITUS WITHOUT COMPLICATION, WITHOUT LONG-TERM CURRENT USE OF INSULIN: Primary | ICD-10-CM

## 2019-11-07 PROCEDURE — 3008F BODY MASS INDEX DOCD: CPT | Mod: CPTII,S$GLB,, | Performed by: FAMILY MEDICINE

## 2019-11-07 PROCEDURE — 99214 PR OFFICE/OUTPT VISIT, EST, LEVL IV, 30-39 MIN: ICD-10-PCS | Mod: S$GLB,,, | Performed by: FAMILY MEDICINE

## 2019-11-07 PROCEDURE — 99999 PR PBB SHADOW E&M-EST. PATIENT-LVL IV: CPT | Mod: PBBFAC,,, | Performed by: FAMILY MEDICINE

## 2019-11-07 PROCEDURE — 3044F PR MOST RECENT HEMOGLOBIN A1C LEVEL <7.0%: ICD-10-PCS | Mod: CPTII,S$GLB,, | Performed by: FAMILY MEDICINE

## 2019-11-07 PROCEDURE — 3044F HG A1C LEVEL LT 7.0%: CPT | Mod: CPTII,S$GLB,, | Performed by: FAMILY MEDICINE

## 2019-11-07 PROCEDURE — 99999 PR PBB SHADOW E&M-EST. PATIENT-LVL IV: ICD-10-PCS | Mod: PBBFAC,,, | Performed by: FAMILY MEDICINE

## 2019-11-07 PROCEDURE — 99214 OFFICE O/P EST MOD 30 MIN: CPT | Mod: S$GLB,,, | Performed by: FAMILY MEDICINE

## 2019-11-07 PROCEDURE — 3008F PR BODY MASS INDEX (BMI) DOCUMENTED: ICD-10-PCS | Mod: CPTII,S$GLB,, | Performed by: FAMILY MEDICINE

## 2019-11-07 NOTE — PROGRESS NOTES
Subjective:   Patient ID: Clive Guzman is a 64 y.o. male     Chief Complaint:Follow-up (6 months)      Patient sources symptoms of shortness of breath when he lays flat.  Patient states at times this is causes his difficulty sleeping.  Patient denies any chest pain or diaphoresis associated with this.  Patient denies any history of heart disease. Patient does endorse some swelling in his lower extremities.  Patient also endorses shortness of breath when going up 2 flights of stairs.    Review of Systems   Respiratory: Positive for shortness of breath.    Cardiovascular: Positive for leg swelling. Negative for chest pain.   Gastrointestinal: Negative for abdominal pain.   Genitourinary: Negative for dysuria.     Past Medical History:   Diagnosis Date    Diabetes mellitus, type 2     GERD (gastroesophageal reflux disease)     Hyperlipidemia     Migraines     NAFLD (nonalcoholic fatty liver disease)     Obstructive sleep apnea on CPAP     @ 13 cm/H2)    Rash and other nonspecific skin eruption 8/20/2012     Objective:     Vitals:    11/07/19 1544   BP: 114/68   Pulse: (!) 57   Temp: 98 °F (36.7 °C)     Body mass index is 35.7 kg/m².  Physical Exam   Neck: Neck supple. No tracheal deviation present.   Cardiovascular: Normal rate, regular rhythm and normal heart sounds.   Pulmonary/Chest: Effort normal. No respiratory distress.   Musculoskeletal: Normal range of motion. He exhibits no edema.     Assessment:     1. Type 2 diabetes mellitus without complication, without long-term current use of insulin    2. Orthopnea    3. Hyperlipidemia, unspecified hyperlipidemia type    4. NAFLD (nonalcoholic fatty liver disease)    5. Shortness of breath      Plan:   Type 2 diabetes mellitus without complication, without long-term current use of insulin  -     Hemoglobin A1c; Future; Expected date: 11/07/2019  Reviewed blood work from May 2019 shows an A1c of 6.0 which is well controlled.  Will continue monitor this  regularly.    Orthopnea  -     Echo Color Flow Doppler? Yes; Future  CC shortness of breath  Hyperlipidemia, unspecified hyperlipidemia type  -     Lipid panel; Future; Expected date: 11/07/2019  Reviewed blood work from May 2019 shows an LDL cholesterol of 134 which is elevated and increasing patient's risk for cardiovascular disease.  Will look at starting Lipitor for patient if still persistently elevated.    NAFLD (nonalcoholic fatty liver disease)  -     Comprehensive metabolic panel; Future; Expected date: 11/07/2019  Reviewed blood work from May 2019 shows normal liver enzymes.  Will continue monitor this regularly.  Will continue to also aggressively manage hyperlipidemia and high blood sugar to help improve this.    Shortness of breath  -     Echo Color Flow Doppler? Yes; Future  Constipation length the risks associated with this.  Advised patient this could be due to cardiac disease.  Recommended that I would rule out this out with a ultrasound of the heart.  Patient elected to go forward with ultrasound.      Time spent with patient: 15 minutes and over half of that time was spent on counseling an coordination of care.    Simone Amezquita MD  11/07/2019    Portions of this note have been dictated with JEANNA Ellison

## 2019-11-09 ENCOUNTER — LAB VISIT (OUTPATIENT)
Dept: LAB | Facility: HOSPITAL | Age: 64
End: 2019-11-09
Attending: FAMILY MEDICINE
Payer: COMMERCIAL

## 2019-11-09 DIAGNOSIS — E78.5 HYPERLIPIDEMIA, UNSPECIFIED HYPERLIPIDEMIA TYPE: ICD-10-CM

## 2019-11-09 DIAGNOSIS — E11.9 TYPE 2 DIABETES MELLITUS WITHOUT COMPLICATION, WITHOUT LONG-TERM CURRENT USE OF INSULIN: ICD-10-CM

## 2019-11-09 DIAGNOSIS — K76.0 NAFLD (NONALCOHOLIC FATTY LIVER DISEASE): ICD-10-CM

## 2019-11-09 LAB
ALBUMIN SERPL BCP-MCNC: 3.9 G/DL (ref 3.5–5.2)
ALP SERPL-CCNC: 35 U/L (ref 55–135)
ALT SERPL W/O P-5'-P-CCNC: 26 U/L (ref 10–44)
ANION GAP SERPL CALC-SCNC: 6 MMOL/L (ref 8–16)
AST SERPL-CCNC: 18 U/L (ref 10–40)
BILIRUB SERPL-MCNC: 0.6 MG/DL (ref 0.1–1)
BUN SERPL-MCNC: 20 MG/DL (ref 8–23)
CALCIUM SERPL-MCNC: 9.4 MG/DL (ref 8.7–10.5)
CHLORIDE SERPL-SCNC: 104 MMOL/L (ref 95–110)
CHOLEST SERPL-MCNC: 186 MG/DL (ref 120–199)
CHOLEST/HDLC SERPL: 3.8 {RATIO} (ref 2–5)
CO2 SERPL-SCNC: 29 MMOL/L (ref 23–29)
CREAT SERPL-MCNC: 1 MG/DL (ref 0.5–1.4)
EST. GFR  (AFRICAN AMERICAN): >60 ML/MIN/1.73 M^2
EST. GFR  (NON AFRICAN AMERICAN): >60 ML/MIN/1.73 M^2
ESTIMATED AVG GLUCOSE: 123 MG/DL (ref 68–131)
GLUCOSE SERPL-MCNC: 103 MG/DL (ref 70–110)
HBA1C MFR BLD HPLC: 5.9 % (ref 4–5.6)
HDLC SERPL-MCNC: 49 MG/DL (ref 40–75)
HDLC SERPL: 26.3 % (ref 20–50)
LDLC SERPL CALC-MCNC: 121.6 MG/DL (ref 63–159)
NONHDLC SERPL-MCNC: 137 MG/DL
POTASSIUM SERPL-SCNC: 4.9 MMOL/L (ref 3.5–5.1)
PROT SERPL-MCNC: 6.9 G/DL (ref 6–8.4)
SODIUM SERPL-SCNC: 139 MMOL/L (ref 136–145)
TRIGL SERPL-MCNC: 77 MG/DL (ref 30–150)

## 2019-11-09 PROCEDURE — 80061 LIPID PANEL: CPT

## 2019-11-09 PROCEDURE — 83036 HEMOGLOBIN GLYCOSYLATED A1C: CPT

## 2019-11-09 PROCEDURE — 80053 COMPREHEN METABOLIC PANEL: CPT

## 2019-11-09 PROCEDURE — 36415 COLL VENOUS BLD VENIPUNCTURE: CPT | Mod: PO

## 2019-11-12 ENCOUNTER — OFFICE VISIT (OUTPATIENT)
Dept: OPHTHALMOLOGY | Facility: CLINIC | Age: 64
End: 2019-11-12
Payer: COMMERCIAL

## 2019-11-12 ENCOUNTER — CLINICAL SUPPORT (OUTPATIENT)
Dept: CARDIOLOGY | Facility: CLINIC | Age: 64
End: 2019-11-12
Attending: FAMILY MEDICINE
Payer: COMMERCIAL

## 2019-11-12 VITALS
DIASTOLIC BLOOD PRESSURE: 70 MMHG | BODY MASS INDEX: 35.59 KG/M2 | WEIGHT: 234.81 LBS | HEIGHT: 68 IN | HEART RATE: 63 BPM | SYSTOLIC BLOOD PRESSURE: 110 MMHG

## 2019-11-12 DIAGNOSIS — E11.9 DIABETES MELLITUS TYPE 2 WITHOUT RETINOPATHY: ICD-10-CM

## 2019-11-12 DIAGNOSIS — R06.01 ORTHOPNEA: ICD-10-CM

## 2019-11-12 DIAGNOSIS — H40.003 OPEN ANGLE GLAUCOMA WITH BORDERLINE INTRAOCULAR PRESSURE, BILATERAL: Primary | ICD-10-CM

## 2019-11-12 DIAGNOSIS — R06.02 SHORTNESS OF BREATH: ICD-10-CM

## 2019-11-12 DIAGNOSIS — H52.7 REFRACTIVE ERROR: ICD-10-CM

## 2019-11-12 PROCEDURE — 99999 PR PBB SHADOW E&M-EST. PATIENT-LVL III: ICD-10-PCS | Mod: PBBFAC,,, | Performed by: OPHTHALMOLOGY

## 2019-11-12 PROCEDURE — 99999 PR PBB SHADOW E&M-EST. PATIENT-LVL II: ICD-10-PCS | Mod: PBBFAC,,,

## 2019-11-12 PROCEDURE — 92015 DETERMINE REFRACTIVE STATE: CPT | Mod: S$GLB,,, | Performed by: OPHTHALMOLOGY

## 2019-11-12 PROCEDURE — 92015 PR REFRACTION: ICD-10-PCS | Mod: S$GLB,,, | Performed by: OPHTHALMOLOGY

## 2019-11-12 PROCEDURE — 92012 INTRM OPH EXAM EST PATIENT: CPT | Mod: S$GLB,,, | Performed by: OPHTHALMOLOGY

## 2019-11-12 PROCEDURE — 93306 TTE W/DOPPLER COMPLETE: CPT | Mod: S$GLB,,, | Performed by: INTERNAL MEDICINE

## 2019-11-12 PROCEDURE — 92012 PR EYE EXAM, EST PATIENT,INTERMED: ICD-10-PCS | Mod: S$GLB,,, | Performed by: OPHTHALMOLOGY

## 2019-11-12 PROCEDURE — 99999 PR PBB SHADOW E&M-EST. PATIENT-LVL II: CPT | Mod: PBBFAC,,,

## 2019-11-12 PROCEDURE — 99999 PR PBB SHADOW E&M-EST. PATIENT-LVL III: CPT | Mod: PBBFAC,,, | Performed by: OPHTHALMOLOGY

## 2019-11-12 PROCEDURE — 93306 ECHO (CUPID ONLY): ICD-10-PCS | Mod: S$GLB,,, | Performed by: INTERNAL MEDICINE

## 2019-11-13 ENCOUNTER — PROCEDURE VISIT (OUTPATIENT)
Dept: ALLERGY | Facility: CLINIC | Age: 64
End: 2019-11-13
Payer: COMMERCIAL

## 2019-11-13 VITALS
HEIGHT: 68 IN | SYSTOLIC BLOOD PRESSURE: 132 MMHG | BODY MASS INDEX: 35.46 KG/M2 | WEIGHT: 234 LBS | DIASTOLIC BLOOD PRESSURE: 70 MMHG

## 2019-11-13 DIAGNOSIS — R05.3 CHRONIC COUGH: ICD-10-CM

## 2019-11-13 DIAGNOSIS — J31.0 CHRONIC RHINITIS: ICD-10-CM

## 2019-11-13 LAB
ASCENDING AORTA: 3.36 CM
AV INDEX (PROSTH): 0.77
AV MEAN GRADIENT: 2 MMHG
AV PEAK GRADIENT: 4 MMHG
AV VALVE AREA: 2.59 CM2
AV VELOCITY RATIO: 0.78
BSA FOR ECHO PROCEDURE: 2.26 M2
CV ECHO LV RWT: 0.28 CM
DOP CALC AO PEAK VEL: 0.98 M/S
DOP CALC AO VTI: 22.71 CM
DOP CALC LVOT AREA: 3.4 CM2
DOP CALC LVOT DIAMETER: 2.07 CM
DOP CALC LVOT PEAK VEL: 0.76 M/S
DOP CALC LVOT STROKE VOLUME: 58.93 CM3
DOP CALCLVOT PEAK VEL VTI: 17.52 CM
E WAVE DECELERATION TIME: 178.39 MSEC
E/A RATIO: 1.06
E/E' RATIO: 9 M/S
ECHO LV POSTERIOR WALL: 0.67 CM (ref 0.6–1.1)
FRACTIONAL SHORTENING: 31 % (ref 28–44)
INTERVENTRICULAR SEPTUM: 1.01 CM (ref 0.6–1.1)
IVRT: 0.12 MSEC
LA MAJOR: 5.51 CM
LA MINOR: 5.37 CM
LA WIDTH: 3.74 CM
LEFT ATRIUM SIZE: 3.18 CM
LEFT ATRIUM VOLUME INDEX: 25.1 ML/M2
LEFT ATRIUM VOLUME: 54.99 CM3
LEFT INTERNAL DIMENSION IN SYSTOLE: 3.25 CM (ref 2.1–4)
LEFT VENTRICLE DIASTOLIC VOLUME INDEX: 47.51 ML/M2
LEFT VENTRICLE DIASTOLIC VOLUME: 103.96 ML
LEFT VENTRICLE MASS INDEX: 60 G/M2
LEFT VENTRICLE SYSTOLIC VOLUME INDEX: 19.4 ML/M2
LEFT VENTRICLE SYSTOLIC VOLUME: 42.38 ML
LEFT VENTRICULAR INTERNAL DIMENSION IN DIASTOLE: 4.73 CM (ref 3.5–6)
LEFT VENTRICULAR MASS: 131.68 G
LV LATERAL E/E' RATIO: 10.29 M/S
LV SEPTAL E/E' RATIO: 8 M/S
MV PEAK A VEL: 0.68 M/S
MV PEAK E VEL: 0.72 M/S
PISA TR MAX VEL: 2.41 M/S
PULM VEIN S/D RATIO: 0.77
PV PEAK D VEL: 0.39 M/S
PV PEAK S VEL: 0.3 M/S
PV PEAK VELOCITY: 0.69 CM/S
RA MAJOR: 4.84 CM
RA PRESSURE: 3 MMHG
RA WIDTH: 2.31 CM
RIGHT VENTRICULAR END-DIASTOLIC DIMENSION: 2.49 CM
SINUS: 2.33 CM
STJ: 2.69 CM
TDI LATERAL: 0.07 M/S
TDI SEPTAL: 0.09 M/S
TDI: 0.08 M/S
TR MAX PG: 23 MMHG
TRICUSPID ANNULAR PLANE SYSTOLIC EXCURSION: 1.93 CM
TV REST PULMONARY ARTERY PRESSURE: 26 MMHG

## 2019-11-13 PROCEDURE — 95004 PR ALLERGY SKIN TESTS,ALLERGENS: ICD-10-PCS | Mod: S$GLB,,, | Performed by: ALLERGY & IMMUNOLOGY

## 2019-11-13 PROCEDURE — 95004 PERQ TESTS W/ALRGNC XTRCS: CPT | Mod: S$GLB,,, | Performed by: ALLERGY & IMMUNOLOGY

## 2019-11-13 NOTE — PROGRESS NOTES
HPI     DLS: 4/1/19      Pt states not having any new complaints since last visit. Not using any   gtt. Denies pain in eyes today. Denies F/F. DM doing well.  Hemoglobin A1C       Date                     Value               Ref Range             Status                11/09/2019               5.9 (H)             4.0 - 5.6 %           Final                  05/04/2019               6.0 (H)             4.0 - 5.6 %           Final                  06/05/2018               5.9 (H)             4.0 - 5.6 %           Final                Agree with above. Sometimes feels he sees better without glasses, at least   at near.     Last edited by Mariluz Loyola MD on 11/12/2019  4:05 PM.   (History)            Assessment /Plan     For exam results, see Encounter Report.    Open angle glaucoma with borderline intraocular pressure, bilateral    Diabetes mellitus type 2 without retinopathy    Refractive error            IOP a little high, C:D ratio asymmetry noted, although it appears it was there in 2015 on OCT nerve also, with OU borderline temporal at that time. Last  HRT shows borderline thinning OS, WNL OD.  CCT thick - 608//620  Gonio - open to SS OD, SS/CB OS  HVF WNL OU  OCT nerve remains borderline T OD, NI  Follow up in about 3 months (around 2/12/2020) for IOP check, DFE, HRT.    Likely migraine aura, does not exceed 30 minutes    Maintain glucose control. Annual DFE recommended    MRx given.

## 2019-11-14 ENCOUNTER — OFFICE VISIT (OUTPATIENT)
Dept: ALLERGY | Facility: CLINIC | Age: 64
End: 2019-11-14
Payer: COMMERCIAL

## 2019-11-14 VITALS
WEIGHT: 233 LBS | BODY MASS INDEX: 35.31 KG/M2 | SYSTOLIC BLOOD PRESSURE: 130 MMHG | DIASTOLIC BLOOD PRESSURE: 82 MMHG | HEIGHT: 68 IN

## 2019-11-14 DIAGNOSIS — J30.1 SEASONAL ALLERGIC RHINITIS DUE TO POLLEN: Primary | ICD-10-CM

## 2019-11-14 DIAGNOSIS — Z51.6 NEED FOR DESENSITIZATION TO ALLERGENS: ICD-10-CM

## 2019-11-14 DIAGNOSIS — Z91.038 ALLERGY TO COCKROACHES: ICD-10-CM

## 2019-11-14 PROCEDURE — 99214 PR OFFICE/OUTPT VISIT, EST, LEVL IV, 30-39 MIN: ICD-10-PCS | Mod: S$GLB,,, | Performed by: ALLERGY & IMMUNOLOGY

## 2019-11-14 PROCEDURE — 3008F BODY MASS INDEX DOCD: CPT | Mod: S$GLB,,, | Performed by: ALLERGY & IMMUNOLOGY

## 2019-11-14 PROCEDURE — 3008F PR BODY MASS INDEX (BMI) DOCUMENTED: ICD-10-PCS | Mod: S$GLB,,, | Performed by: ALLERGY & IMMUNOLOGY

## 2019-11-14 PROCEDURE — 99214 OFFICE O/P EST MOD 30 MIN: CPT | Mod: S$GLB,,, | Performed by: ALLERGY & IMMUNOLOGY

## 2019-11-14 RX ORDER — AZELASTINE 1 MG/ML
1 SPRAY, METERED NASAL 2 TIMES DAILY
Qty: 90 ML | Refills: 3 | Status: SHIPPED | OUTPATIENT
Start: 2019-11-14 | End: 2019-12-14

## 2019-11-14 RX ORDER — FLUTICASONE PROPIONATE 50 MCG
1 SPRAY, SUSPENSION (ML) NASAL 2 TIMES DAILY
Qty: 3 BOTTLE | Refills: 3 | Status: SHIPPED | OUTPATIENT
Start: 2019-11-14

## 2019-11-14 RX ORDER — EPINEPHRINE 0.3 MG/.3ML
1 INJECTION SUBCUTANEOUS ONCE
Qty: 2 DEVICE | Refills: 6 | Status: SHIPPED | OUTPATIENT
Start: 2019-11-14 | End: 2020-12-18

## 2019-11-14 NOTE — PROGRESS NOTES
"Subjective:       Patient ID: Clive Guzman is a 64 y.o. male.    Chief Complaint: Allergic Rhinitis  (had skin test yesterday, wants to discuss skin test results and possible AIT)    HPI     Pt presents as a new patient for rhinitis and cough.     Rhinitis:  Condition: improved   Seasonal: late winter , early spring   Sx: mucus, sneezing, RN, ear pressure, PND  Tx: mucinex, house filters , H1 first gen, allegra, claritin, zyrtec, dymista- uses when symptoms are bad. 1 sen prn.    Morning is worse time of day   Trigger: cat   Allergy testin yrs ago.   [place]   - would like to be back on AIT. On beta blocker. He takes this for migraine. He will be able to hold x 48 hours per report.    Cough:  Condition: stable    Onset: adulthood  Sx: wheezing, productive cough    Prior dx as bronchitis.   Tx: dulera 100 -didn't help   Seasonal trigger: spring   Rescue: doesn't use one. < 2 days per week.     Does wear a cpap.     Pets: cat in the house but is now out of the house.     Works at [employer] at [employer].       Review of Systems      General: neg unexpected weight changes, fevers, chills, night sweats, malaise  HEENT: see hpi, Neg eye pain, vision changes, ear drainage, nose bleeds, throat tightness, sores in the mouth  CV: Neg chest pain, palpitations, swelling  Resp: see hpi, neg shortness of breath, hemoptysis  GI: see hpi, neg dysphagia, night abdominal pain, reflux, chronic diarrhea, chronic constipation  Derm: See Hpi, neg new rash, neg flushing  Mu/sk: Neg joint pain, joint swelling   Psych: Neg anxiety  neuro: neg chronic headaches, muscle weakness  Endo: neg heat/cold intolerance, chronic fatigue    Objective:     Vitals:    19 1540   BP: 130/82   Weight: 105.7 kg (233 lb)   Height: 5' 8" (1.727 m)        Physical Exam      General: no acute distress, well developed well nourished   HEENT:   Head:normocephalic atraumatic  Eyes: KALI, EOMI, Neg injection, scleral icterus, " or conjunctival papillary hypertrophy.  Ears: tm clear bilaterally, normal canal  Nose: 2-3+ inferior turbinates pink, neg nasal polyps            Mucosa: moist            Septal irritation: none   OP: mucus membranes moist, - cobblestoning, - PND, neg erythema or lesions  Neck: supple, Full range of motion, neg lymphadenopathy  Chest: full respiratory excursion no abnormal chest abnormality  Resp: clear to ascultation bilaterally  CV: RRR, neg MRG, brisk capillary refill  Abdomen: BS+, non tender, non distended  Ext:  Neg clubbing, cyanosis, pitting edema  Skin: Neg rashes or lesions  Lymph: neg supraclavicular, axillary     Assessment:       1. Seasonal allergic rhinitis due to pollen    2. Allergy to cockroaches    3. Need for desensitization to allergens        Plan:       Seasonal allergic rhinitis due to pollen  -     azelastine (ASTELIN) 137 mcg (0.1 %) nasal spray; 1 spray (137 mcg total) by Nasal route 2 (two) times daily.  Dispense: 90 mL; Refill: 3  -     fluticasone propionate (FLONASE) 50 mcg/actuation nasal spray; 1 spray (50 mcg total) by Each Nostril route 2 (two) times daily.  Dispense: 3 Bottle; Refill: 3    Allergy to cockroaches    Need for desensitization to allergens  -     EPINEPHrine (EPIPEN) 0.3 mg/0.3 mL AtIn; Inject 0.3 mLs (0.3 mg total) into the muscle once. for 1 dose  Dispense: 2 Device; Refill: 6    pt would like to start AIT.   Discussed risks and benefits  Skin prick testing - cR, tree sycamore and oak, borderline grasses, and sagebrush   Pt to bring me paper okaying use of ait in [place] prior to giving extracts.     Continue dymista fluticasone and azelastine     Skin prick test reviewed and explained today, risk and benefits discussed with patient .     Epinephrine given in event of anaphylaxis off site    Refill azelastine     Spent face to face > 25 mins discussing AIT, management, and anaphylaxis and how I will need his job to sign off on risk for tx anaphylaxis with ait.          Elba Underwood M.D.  Allergy/Immunology  [facility]   [phone] phone  [phone] fax

## 2019-11-14 NOTE — PATIENT INSTRUCTIONS
Take antihistamines prior to your injections.     This can be anyone.   I prefer something other than benadryl so that it is not sedating.     I will need a letter stating they take responsibility and will administer allergy shots and will take care of anaphylaxis if it occurs.

## 2019-11-18 ENCOUNTER — TELEPHONE (OUTPATIENT)
Dept: SLEEP MEDICINE | Facility: CLINIC | Age: 64
End: 2019-11-18

## 2019-12-05 ENCOUNTER — OFFICE VISIT (OUTPATIENT)
Dept: DERMATOLOGY | Facility: CLINIC | Age: 64
End: 2019-12-05
Payer: COMMERCIAL

## 2019-12-05 VITALS — WEIGHT: 233 LBS | BODY MASS INDEX: 35.31 KG/M2 | HEIGHT: 68 IN

## 2019-12-05 DIAGNOSIS — L30.1 DYSHIDROTIC ECZEMA: Primary | ICD-10-CM

## 2019-12-05 DIAGNOSIS — L82.1 SEBORRHEIC KERATOSES: ICD-10-CM

## 2019-12-05 DIAGNOSIS — L01.00 IMPETIGO: ICD-10-CM

## 2019-12-05 PROCEDURE — 3008F PR BODY MASS INDEX (BMI) DOCUMENTED: ICD-10-PCS | Mod: CPTII,S$GLB,, | Performed by: DERMATOLOGY

## 2019-12-05 PROCEDURE — 99213 OFFICE O/P EST LOW 20 MIN: CPT | Mod: S$GLB,,, | Performed by: DERMATOLOGY

## 2019-12-05 PROCEDURE — 99999 PR PBB SHADOW E&M-EST. PATIENT-LVL III: ICD-10-PCS | Mod: PBBFAC,,, | Performed by: DERMATOLOGY

## 2019-12-05 PROCEDURE — 3008F BODY MASS INDEX DOCD: CPT | Mod: CPTII,S$GLB,, | Performed by: DERMATOLOGY

## 2019-12-05 PROCEDURE — 99999 PR PBB SHADOW E&M-EST. PATIENT-LVL III: CPT | Mod: PBBFAC,,, | Performed by: DERMATOLOGY

## 2019-12-05 PROCEDURE — 99213 PR OFFICE/OUTPT VISIT, EST, LEVL III, 20-29 MIN: ICD-10-PCS | Mod: S$GLB,,, | Performed by: DERMATOLOGY

## 2019-12-05 RX ORDER — BETAMETHASONE DIPROPIONATE 0.5 MG/G
OINTMENT TOPICAL
Qty: 45 G | Refills: 0 | Status: SHIPPED | OUTPATIENT
Start: 2019-12-05 | End: 2021-01-27

## 2019-12-05 RX ORDER — MUPIROCIN 20 MG/G
OINTMENT TOPICAL 3 TIMES DAILY
Qty: 22 G | Refills: 0 | Status: SHIPPED | OUTPATIENT
Start: 2019-12-05 | End: 2021-01-27

## 2019-12-05 NOTE — PROGRESS NOTES
Subjective:       Patient ID:  Clive Guzman is a 64 y.o. male who presents for   Chief Complaint   Patient presents with    Lesion     L thumb, 1.5 months, spreading, tender, slightly itchy, clear drainage, tx with Neosporin and Eucrisa     Pt last seen 5-19-17 for diffuse rash, rx'd Lidex, resolved.    Here today for lesion to L thumb for 1.5 months.  Initially began as small blister-like lesion, spreading, tender, some discharge.  Tx with Neosporin and one application of Eucrisa (used wifes), no improvement.  Denies any new environmental or chemical exposures.    Works in [employer], denies contact with chemicals      Review of Systems   Constitutional: Negative for fever, chills and fatigue.   Skin: Positive for activity-related sunscreen use and wears hat. Negative for daily sunscreen use.   Hematologic/Lymphatic: Does not bruise/bleed easily.        Objective:    Physical Exam   Constitutional: He appears well-developed and well-nourished. No distress.   Neurological: He is alert and oriented to person, place, and time. He is not disoriented.   Psychiatric: He has a normal mood and affect.   Skin:   Areas Examined (abnormalities noted in diagram):   LUE Inspection Performed  Nails and Digits Inspection Performed              Diagram Legend     Erythematous scaling macule/papule c/w actinic keratosis       Vascular papule c/w angioma      Pigmented verrucoid papule/plaque c/w seborrheic keratosis      Yellow umbilicated papule c/w sebaceous hyperplasia      Irregularly shaped tan macule c/w lentigo     1-2 mm smooth white papules consistent with Milia      Movable subcutaneous cyst with punctum c/w epidermal inclusion cyst      Subcutaneous movable cyst c/w pilar cyst      Firm pink to brown papule c/w dermatofibroma      Pedunculated fleshy papule(s) c/w skin tag(s)      Evenly pigmented macule c/w junctional nevus     Mildly variegated pigmented, slightly  irregular-bordered macule c/w mildly atypical nevus      Flesh colored to evenly pigmented papule c/w intradermal nevus       Pink pearly papule/plaque c/w basal cell carcinoma      Erythematous hyperkeratotic cursted plaque c/w SCC      Surgical scar with no sign of skin cancer recurrence      Open and closed comedones      Inflammatory papules and pustules      Verrucoid papule consistent consistent with wart     Erythematous eczematous patches and plaques     Dystrophic onycholytic nail with subungual debris c/w onychomycosis     Umbilicated papule    Erythematous-base heme-crusted tan verrucoid plaque consistent with inflamed seborrheic keratosis     Erythematous Silvery Scaling Plaque c/w Psoriasis     See annotation          Assessment / Plan:        Dyshidrotic eczema  -     betamethasone dipropionate (DIPROLENE) 0.05 % ointment; AAA left thumb BID  Dispense: 45 g; Refill: 0    Impetigo  -     mupirocin (BACTROBAN) 2 % ointment; Apply topically 3 (three) times daily. AAA left thumb BID  Dispense: 22 g; Refill: 0    Favor impetiginized eczema  Do not feel viral at this time  Trial of above  Add aquaphore throughout day  Desk employment, engineering, handles mostly paper    Seborrheic keratoses  These are benign inherited growths without a malignant potential. Reassurance given to patient. No treatment is necessary.              Follow up if symptoms worsen or fail to improve.

## 2019-12-13 DIAGNOSIS — K21.9 GASTROESOPHAGEAL REFLUX DISEASE, ESOPHAGITIS PRESENCE NOT SPECIFIED: ICD-10-CM

## 2019-12-13 RX ORDER — OMEPRAZOLE 40 MG/1
CAPSULE, DELAYED RELEASE ORAL
Qty: 90 CAPSULE | Refills: 3 | Status: SHIPPED | OUTPATIENT
Start: 2019-12-13 | End: 2020-12-07

## 2019-12-23 DIAGNOSIS — G43.909 MIGRAINE WITHOUT STATUS MIGRAINOSUS, NOT INTRACTABLE, UNSPECIFIED MIGRAINE TYPE: ICD-10-CM

## 2019-12-24 RX ORDER — ZOLMITRIPTAN 5 MG/1
TABLET, FILM COATED ORAL
Qty: 9 TABLET | Refills: 0 | Status: SHIPPED | OUTPATIENT
Start: 2019-12-24 | End: 2020-02-10 | Stop reason: SDUPTHER

## 2020-01-15 ENCOUNTER — DOCUMENTATION ONLY (OUTPATIENT)
Dept: ALLERGY | Facility: CLINIC | Age: 65
End: 2020-01-15
Payer: COMMERCIAL

## 2020-01-15 DIAGNOSIS — Z91.038 ALLERGY TO COCKROACHES: ICD-10-CM

## 2020-01-15 PROCEDURE — 95165 PR PROFES SVC,IMMUNOTHER,SINGLE/MULT AGS: ICD-10-PCS | Mod: S$GLB,,, | Performed by: ALLERGY & IMMUNOLOGY

## 2020-01-15 PROCEDURE — 95165 ANTIGEN THERAPY SERVICES: CPT | Mod: S$GLB,,, | Performed by: ALLERGY & IMMUNOLOGY

## 2020-01-15 NOTE — PROGRESS NOTES
Using 70% alcohol clean surface was prepped. Sterile drape then covered clean space.  Hands were scrubbed and placed into sterile gloves. Gown, mask, and hair net worn according to  guidelines allergist exemption.    New start vial 1 and vial 2     80 units

## 2020-01-17 NOTE — TELEPHONE ENCOUNTER
"[facility]  Adult Nutrition  Progress Note    SUMMARY       Recommendations     1. Continue regular diet with Boost Plus TID.  Goals: 1. Pt's intake meals >75% x 7 days.  Nutrition Goal Status: progressing towards goal  Communication of RD Recs: (POC)    Reason for Assessment    Reason For Assessment: length of stay  Diagnosis: cancer diagnosis/related complications(metastatic renal carcinoma)  Relevant Medical History: nephrectomy 2013, GERD  Interdisciplinary Rounds: attended  General Information Comments: Pt with metastic Ca has stable wt x 2 years, 75% intake meals since admission. Unable to see pt as he was with other providers for multiple tries. Pt appears generally healthy, will follow up.  Nutrition Discharge Planning: Pt to follow general healthy diet.    Nutrition Risk Screen    Nutrition Risk Screen: no indicators present    Nutrition/Diet History    Spiritual, Cultural Beliefs, Baptist Practices, Values that Affect Care: no    Anthropometrics    Temp: 97.5 °F (36.4 °C)  Height Method: Stated  Height: 5' 5" (165.1 cm)  Height (inches): 65 in  Weight Method: Bed Scale  Weight: 68.1 kg (150 lb 2.1 oz)  Weight (lb): 150.13 lb  Ideal Body Weight (IBW), Male: 136 lb  % Ideal Body Weight, Male (lb): 110.39 %  BMI (Calculated): 25       Lab/Procedures/Meds    Pertinent Labs Reviewed: reviewed  Pertinent Labs Comments: CO2 20, BUN 26, Glu 157  Pertinent Medications Reviewed: reviewed  Pertinent Medications Comments: dexamethasone, pantoprazole    Physical Findings/Assessment         Estimated/Assessed Needs    Weight Used For Calorie Calculations: 68.1 kg (150 lb 2.1 oz)  Energy Calorie Requirements (kcal): 1716 kcal  Energy Need Method: [place](PAL 1.25)  Protein Requirements: 68-82g/day  Weight Used For Protein Calculations: 68.1 kg (150 lb 2.1 oz)        RDA Method (mL): 1716         Nutrition Prescription Ordered    Current Diet Order: Regular  Oral Nutrition Supplement: Boost " LMOR to call our office at their earliest convenience.    Plus TID    Evaluation of Received Nutrient/Fluid Intake    Comments: LBM 1/16  % Intake of Estimated Energy Needs: 75 - 100 %  % Meal Intake: 75 - 100 %    Nutrition Risk    Level of Risk/Frequency of Follow-up: low     Assessment and Plan    No nutrition diagnosis at this time.    Monitor and Evaluation    Food and Nutrient Intake: energy intake, food and beverage intake  Food and Nutrient Adminstration: diet order  Knowledge/Beliefs/Attitudes: food and nutrition knowledge/skill  Anthropometric Measurements: weight, weight change, body mass index  Biochemical Data, Medical Tests and Procedures: electrolyte and renal panel, gastrointestinal profile, glucose/endocrine profile, inflammatory profile, lipid profile  Nutrition-Focused Physical Findings: overall appearance, extremities, muscles and bones, head and eyes, skin     Malnutrition Assessment     Pt does not meet criteria for malnutrition.    Nutrition Follow-Up    RD Follow-up?: Yes

## 2020-01-27 ENCOUNTER — CLINICAL SUPPORT (OUTPATIENT)
Dept: ALLERGY | Facility: CLINIC | Age: 65
End: 2020-01-27
Payer: COMMERCIAL

## 2020-01-27 VITALS — HEART RATE: 59 BPM | BODY MASS INDEX: 35.31 KG/M2 | HEIGHT: 68 IN | WEIGHT: 233 LBS | OXYGEN SATURATION: 98 %

## 2020-01-27 DIAGNOSIS — J30.1 SEASONAL ALLERGIC RHINITIS DUE TO POLLEN: Primary | ICD-10-CM

## 2020-01-27 PROCEDURE — 95117 PR IMMU2THERAPY, 2+ INJECTIONS: ICD-10-PCS | Mod: S$GLB,,, | Performed by: ALLERGY & IMMUNOLOGY

## 2020-01-27 PROCEDURE — 95117 IMMUNOTHERAPY INJECTIONS: CPT | Mod: S$GLB,,, | Performed by: ALLERGY & IMMUNOLOGY

## 2020-01-27 NOTE — PROGRESS NOTES
Immunotherapy Note: Allergy Shot      Subjective:       Patient ID: Clive Guzman is a 64 y.o. male presents for weekly immunotherapy  Tolerated last injection  No New Medications  Beta Blockers: not on beta blocker    Are you on any beta blockers?: No  Has the vial ?: No    Are you ill today?: No  Asthma exacerbation or symptoms: No  Do you have a fever today?: No    Peak Flow: 450    Expiration Date #1: 04/15/20  Vial Concentration: 1:1,000 v/v (GREEN)  Dose (mL) #1: 0.1 mL  Location: Right upper arm  Given By: EL    Expiration Date #2: 04/15/20  Vial Concentration: 1:1,000 v/v (GREEN)  Dose (mL) #2: 0.1 mL  Location: Left upper arm  Given By : EL                           Objective:     Clive Guzman observed for 30 minutes and discharged in good condition        Discussion:     Pt understands the current recommendation. All questions answered to the best of my ability. Continue current management plan.      Electronically signed by Elba Underwood    Allergy/Imunology  [facility]  [place] [address]  [place] [address]   Office [phone]

## 2020-01-28 ENCOUNTER — CLINICAL SUPPORT (OUTPATIENT)
Dept: ALLERGY | Facility: CLINIC | Age: 65
End: 2020-01-28
Payer: COMMERCIAL

## 2020-01-28 VITALS
HEART RATE: 63 BPM | SYSTOLIC BLOOD PRESSURE: 126 MMHG | BODY MASS INDEX: 35.61 KG/M2 | DIASTOLIC BLOOD PRESSURE: 80 MMHG | HEIGHT: 68 IN | WEIGHT: 235 LBS | OXYGEN SATURATION: 97 %

## 2020-01-28 DIAGNOSIS — J30.1 SEASONAL ALLERGIC RHINITIS DUE TO POLLEN: ICD-10-CM

## 2020-01-28 PROCEDURE — 95180 PR RAPID DESENSITIZATION PROC,EACH HOUR: ICD-10-PCS | Mod: S$GLB,,, | Performed by: ALLERGY & IMMUNOLOGY

## 2020-01-28 PROCEDURE — 95180 RAPID DESENSITIZATION: CPT | Mod: S$GLB,,, | Performed by: ALLERGY & IMMUNOLOGY

## 2020-01-28 NOTE — PROGRESS NOTES
Immunotherapy Note: Allergy Shot      Subjective:       Patient ID: Clive Guzman is a 64 y.o. male presents for weekly immunotherapy  Tolerated last injection  No New Medications  Beta Blockers: Held beta blocker for 24 hours       Are you on any beta blockers?: No  Has the vial ?: No     Are you ill today?: No  Asthma exacerbation or symptoms: No  Do you have a fever today?: No     Peak Flow: 500     Expiration Date #1: 04/15/20  Vial Concentration: 1:1,000 v/v (GREEN)  Dose (mL) #1: 0.2 mL  Location: Right upper arm  Given By: EL     Expiration Date #2: 04/15/20  Vial Concentration: 1:1,000 v/v (GREEN)  Dose (mL) #2: 0.2 mL  Location: Left upper arm  Given By : EL    Peak Flow: 490    Expiration Date #1: 04/15/20  Vial Concentration: 1:1,000 v/v (GREEN)  Dose (mL) #1: 0.35 mL  Location: Right upper arm  Given By: EL     Expiration Date #2: 04/15/20  Vial Concentration: 1:1,000 v/v (GREEN)  Dose (mL) #2: 0.35 mL  Location: Left upper arm  Given By : EL         Peak Flow: 500    Expiration Date #1: 04/15/20  Vial Concentration: 1:1,000 v/v (GREEN)  Dose (mL) #1: 0.5 mL  Location: Right upper arm  Given By: EL     Expiration Date #2: 04/15/20  Vial Concentration: 1:1,000 v/v (GREEN)  Dose (mL) #2: 0.5 mL  Location: Left upper arm  Given By : EL       Peak Flow: 500                              Objective:     Clive Guzman observed for 155 minutes and discharged in good condition        Discussion:     Pt understands the current recommendation. All questions answered to the best of my ability. Continue current management plan.      Electronically signed by Elba Underwood    Allergy/Imunology  [facility]  [place] [address]  [place] [address]   Office [phone]

## 2020-02-04 ENCOUNTER — CLINICAL SUPPORT (OUTPATIENT)
Dept: ALLERGY | Facility: CLINIC | Age: 65
End: 2020-02-04
Payer: COMMERCIAL

## 2020-02-04 VITALS
HEIGHT: 68 IN | SYSTOLIC BLOOD PRESSURE: 124 MMHG | BODY MASS INDEX: 35.46 KG/M2 | WEIGHT: 234 LBS | DIASTOLIC BLOOD PRESSURE: 64 MMHG | OXYGEN SATURATION: 98 % | HEART RATE: 76 BPM

## 2020-02-04 DIAGNOSIS — J30.1 SEASONAL ALLERGIC RHINITIS DUE TO POLLEN: ICD-10-CM

## 2020-02-04 PROCEDURE — 95180 PR RAPID DESENSITIZATION PROC,EACH HOUR: ICD-10-PCS | Mod: S$GLB,,, | Performed by: ALLERGY & IMMUNOLOGY

## 2020-02-04 PROCEDURE — 95180 RAPID DESENSITIZATION: CPT | Mod: S$GLB,,, | Performed by: ALLERGY & IMMUNOLOGY

## 2020-02-04 NOTE — PROGRESS NOTES
Immunotherapy Note: Allergy Shot      Subjective:       Patient ID: Clive Guzman is a 64 y.o. male presents for Cluster Immunotherapy  Tolerated last injection  No New Medications  Beta Blockers: Held beta blocker for 24 hours       Are you on any beta blockers?: No  Has the vial ?: No     Are you ill today?: No  Asthma exacerbation or symptoms: No  Do you have a fever today?: No     Peak Flow: 510     Expiration Date #1: 04/15/20  Vial Concentration: 1:1,00 v/v (BLUE)  Dose (mL) #1: 0.1 mL  Location: Right upper arm  Given By: EL     Expiration Date #2: 04/15/20  Vial Concentration: 1:1,00 v/v (BLUE)  Dose (mL) #2: 0.1 mL  Location: Left upper arm  Given By : EL    Peak Flow: 500    Expiration Date #1: 04/15/20  Vial Concentration: 1:1,00 v/v (BLUE)  Dose (mL) #1: 0.3 mL  Location: Right upper arm  Given By: EL     Expiration Date #2: 04/15/20  Vial Concentration: 1:1,00 v/v (BLUE)  Dose (mL) #2: 0.3 mL  Location: Left upper arm  Given By : EL         Peak Flow: 550    Expiration Date #1: 04/15/20  Vial Concentration: 1:1,00 v/v (BLUE)  Dose (mL) #1: 0.5 mL  Location: Right upper arm  Given By: EL     Expiration Date #2: 04/15/20  Vial Concentration: 1:1,00 v/v (BLUE)  Dose (mL) #2: 0.5 mL  Location: Left upper arm  Given By : EL       Peak Flow: 510          Objective:     Clive Guzman observed for 155 minutes and discharged in good condition        Discussion:     Pt understands the current recommendation. All questions answered to the best of my ability. Continue current management plan.      Electronically signed by Elba Underwood    Allergy/Imunology  [facility]  [place] [address]  [place] [address]   Office [phone]

## 2020-02-10 DIAGNOSIS — G43.909 MIGRAINE WITHOUT STATUS MIGRAINOSUS, NOT INTRACTABLE, UNSPECIFIED MIGRAINE TYPE: ICD-10-CM

## 2020-02-10 RX ORDER — ZOLMITRIPTAN 5 MG/1
TABLET, FILM COATED ORAL
Qty: 9 TABLET | Refills: 0 | Status: SHIPPED | OUTPATIENT
Start: 2020-02-10 | End: 2020-07-09

## 2020-02-11 ENCOUNTER — CLINICAL SUPPORT (OUTPATIENT)
Dept: ALLERGY | Facility: CLINIC | Age: 65
End: 2020-02-11
Payer: COMMERCIAL

## 2020-02-11 ENCOUNTER — PATIENT OUTREACH (OUTPATIENT)
Dept: ADMINISTRATIVE | Facility: OTHER | Age: 65
End: 2020-02-11

## 2020-02-11 VITALS
SYSTOLIC BLOOD PRESSURE: 132 MMHG | BODY MASS INDEX: 36.19 KG/M2 | WEIGHT: 238 LBS | DIASTOLIC BLOOD PRESSURE: 74 MMHG | OXYGEN SATURATION: 98 % | HEART RATE: 63 BPM

## 2020-02-11 DIAGNOSIS — J30.1 SEASONAL ALLERGIC RHINITIS DUE TO POLLEN: ICD-10-CM

## 2020-02-11 PROCEDURE — 95180 RAPID DESENSITIZATION: CPT | Mod: S$GLB,,, | Performed by: ALLERGY & IMMUNOLOGY

## 2020-02-11 PROCEDURE — 95180 PR RAPID DESENSITIZATION PROC,EACH HOUR: ICD-10-PCS | Mod: S$GLB,,, | Performed by: ALLERGY & IMMUNOLOGY

## 2020-02-11 NOTE — PROGRESS NOTES
Immunotherapy Note: Allergy Shot      Subjective:       Patient ID: Clive Guzman is a 64 y.o. male presents for Cluster Immunotherapy  Tolerated last injection  No New Medications  Beta Blockers: Held beta blocker for 24 hours       Are you on any beta blockers?: No  Has the vial ?: No     Are you ill today?: No  Asthma exacerbation or symptoms: No  Do you have a fever today?: No     Peak Flow: 510     Expiration Date #1: 07/15/20  Vial Concentration: 1:10 v/v (YELLOW)  Dose (mL) #1: 0.1 mL  Location: Right upper arm  Given By: EL     Expiration Date #2: 07/15/20  Vial Concentration: 1:10 v/v (YELLOW)  Dose (mL) #2: 0.1 mL  Location: Left upper arm  Given By : EL    Peak Flow: 530    Expiration Date #1: 07/15/20  Vial Concentration: 1:10 v/v (YELLOW)  Dose (mL) #1: 0.25 mL  Location: Right upper arm  Given By: EL     Expiration Date #2: 07/15/20  Vial Concentration: 1:10 v/v (YELLOW)  Dose (mL) #2: 0.25 mL  Location: Left upper arm  Given By : EL      Peak Flow: 530    Expiration Date #1: 07/15/20  Vial Concentration: 1:10 v/v (YELLOW)  Dose (mL) #1: 0.35 mL  Location: Right upper arm  Given By: EL     Expiration Date #2: 07/15/20  Vial Concentration: 1:10 v/v (YELLOW)  Dose (mL) #2: 0.35 mL  Location: Left upper arm  Given By : EL     Peak Flow: 510          Objective:     Clive Guzman observed for 155 minutes and discharged in good condition        Discussion:     Pt understands the current recommendation. All questions answered to the best of my ability. Continue current management plan.      Electronically signed by Kori Garcia    Allergy/Imunology  [facility]  [facility]  [address]  [place] [address]   Office [phone]

## 2020-02-13 ENCOUNTER — OFFICE VISIT (OUTPATIENT)
Dept: OPHTHALMOLOGY | Facility: CLINIC | Age: 65
End: 2020-02-13
Payer: COMMERCIAL

## 2020-02-13 DIAGNOSIS — H52.7 REFRACTIVE ERROR: ICD-10-CM

## 2020-02-13 DIAGNOSIS — E11.9 DIABETES MELLITUS TYPE 2 WITHOUT RETINOPATHY: Primary | ICD-10-CM

## 2020-02-13 DIAGNOSIS — H40.003 OPEN ANGLE GLAUCOMA WITH BORDERLINE INTRAOCULAR PRESSURE, BILATERAL: ICD-10-CM

## 2020-02-13 PROCEDURE — 99999 PR PBB SHADOW E&M-EST. PATIENT-LVL II: CPT | Mod: PBBFAC,,, | Performed by: OPHTHALMOLOGY

## 2020-02-13 PROCEDURE — 99999 PR PBB SHADOW E&M-EST. PATIENT-LVL II: ICD-10-PCS | Mod: PBBFAC,,, | Performed by: OPHTHALMOLOGY

## 2020-02-13 PROCEDURE — 92133 CPTRZD OPH DX IMG PST SGM ON: CPT | Mod: S$GLB,,, | Performed by: OPHTHALMOLOGY

## 2020-02-13 PROCEDURE — 92133 HEIDELBERG RETINA TOMOGRAPHY (HRT) - OU - BOTH EYES: ICD-10-PCS | Mod: S$GLB,,, | Performed by: OPHTHALMOLOGY

## 2020-02-13 PROCEDURE — 92014 COMPRE OPH EXAM EST PT 1/>: CPT | Mod: S$GLB,,, | Performed by: OPHTHALMOLOGY

## 2020-02-13 PROCEDURE — 92014 PR EYE EXAM, EST PATIENT,COMPREHESV: ICD-10-PCS | Mod: S$GLB,,, | Performed by: OPHTHALMOLOGY

## 2020-02-13 RX ORDER — AZELASTINE 1 MG/ML
SPRAY, METERED NASAL
COMMUNITY
Start: 2019-12-28 | End: 2020-05-18

## 2020-02-13 NOTE — PROGRESS NOTES
HPI     3 month IOP check, DFE, HRT today. Denies eye pain. Does not use any eye   gtts currently.     Lab Results       Component                Value               Date                       HGBA1C                   5.9 (H)             11/09/2019                 HGBA1C                   6.0 (H)             05/04/2019                 HGBA1C                   5.9 (H)             06/05/2018            No results found for: LABA1C      Last edited by Mariluz Loyola MD on 2/13/2020 11:22 AM.   (History)        ROS     Positive for: Neurological (denies neuropathy; takes Zomig for migraine   abortive, nadolol for prophylaxis), Endocrine (DM diagnosed 2017),   Allergic/Imm (nasal congestion from allergies from time to time),   Heme/Lymph (ASA)    Negative for: Genitourinary (denies nephropathy), Cardiovascular (denies   HTN), Eyes, Respiratory (denies asthma/SOB)    Last edited by Mariluz Loyola MD on 2/13/2020 11:21 AM.   (History)        Assessment /Plan     For exam results, see Encounter Report.    Diabetes mellitus type 2 without retinopathy    Open angle glaucoma with borderline intraocular pressure, bilateral  -     [facility]ones Retina Tomography (HRT) - OU - Both Eyes    Refractive error          IOP a little high, C:D ratio asymmetry noted, although it appears it was there in 2015 on OCT nerve also, with OU borderline temporal at that time. HRT WNL OD possible progression OS.  CCT thick - 608//620  Gonio - open to SS OD, SS/CB OS  HVF WNL OU  OCT nerve remains borderline T OD, NI  Follow up in about 4 months (around 6/13/2020) for IOP check, OCT optic nerve.    Likely migraine aura, does not exceed 30 minutes    Maintain glucose control. Annual DFE recommended    MRx given prior visit.      Possible HRT progression OS today; OD stable. Clinically appears stable.   Follow up in about 4 months (around 6/13/2020) for IOP check, OCT optic nerve.

## 2020-03-03 ENCOUNTER — CLINICAL SUPPORT (OUTPATIENT)
Dept: ALLERGY | Facility: CLINIC | Age: 65
End: 2020-03-03
Payer: COMMERCIAL

## 2020-03-03 VITALS
WEIGHT: 233 LBS | BODY MASS INDEX: 35.31 KG/M2 | SYSTOLIC BLOOD PRESSURE: 135 MMHG | DIASTOLIC BLOOD PRESSURE: 72 MMHG | HEART RATE: 66 BPM | OXYGEN SATURATION: 98 % | HEIGHT: 68 IN

## 2020-03-03 DIAGNOSIS — J30.1 SEASONAL ALLERGIC RHINITIS DUE TO POLLEN: ICD-10-CM

## 2020-03-03 PROCEDURE — 95180 PR RAPID DESENSITIZATION PROC,EACH HOUR: ICD-10-PCS | Mod: S$GLB,,, | Performed by: ALLERGY & IMMUNOLOGY

## 2020-03-03 PROCEDURE — 95180 RAPID DESENSITIZATION: CPT | Mod: S$GLB,,, | Performed by: ALLERGY & IMMUNOLOGY

## 2020-03-03 NOTE — PROGRESS NOTES
Immunotherapy Note:    Subjective:       Patient ID: Clive Guzman is a 64 y.o. male presents for cluster immunotherapy  Tolerated last injection  No New Medications  Beta Blockers: Held beta blocker for 24 hours        Are you on any beta blockers?: Yes  Has the vial ?: No     Are you ill today?: No  Asthma exacerbation or symptoms: No  Do you have a fever today?: No     Peak Flow: 500     Expiration Date #1: 07/15/20  Vial Concentration: 1:10 v/v (YELLOW)  Dose (mL) #1: 0.35 mL  Location: Right upper arm  Given By: EL     Expiration Date #2: 07/15/20  Vial Concentration: 1:10 v/v (YELLOW)  Dose (mL) #2: 0.35 mL  Location: Left upper arm  Given By : EL     Peak Flow: 500     Expiration Date #1: 07/15/20  Vial Concentration: 1:10 v/v (YELLOW)  Dose (mL) #1: 0.5 mL  Location: Right upper arm  Given By: EL     Expiration Date #2: 07/15/20  Vial Concentration: 1:10 v/v (YELLOW)  Dose (mL) #2: 0.5 mL  Location: Left upper arm  Given By : EL                                           Objective:     Clive Guzman Total Procedure time 155 minutes of face to face time.         Discussion:     Pt understands the current recommendation. All questions answered to the best of my ability. Continue current management plan.          Electronically signed by Kori Garcia    Allergy/Imunology  [facility]  [place] [address]  [place] [address]   Office [phone]

## 2020-03-12 ENCOUNTER — CLINICAL SUPPORT (OUTPATIENT)
Dept: ALLERGY | Facility: CLINIC | Age: 65
End: 2020-03-12
Payer: COMMERCIAL

## 2020-03-12 VITALS
OXYGEN SATURATION: 98 % | WEIGHT: 233 LBS | HEIGHT: 68 IN | SYSTOLIC BLOOD PRESSURE: 153 MMHG | BODY MASS INDEX: 35.31 KG/M2 | HEART RATE: 63 BPM | DIASTOLIC BLOOD PRESSURE: 79 MMHG

## 2020-03-12 DIAGNOSIS — J30.1 SEASONAL ALLERGIC RHINITIS DUE TO POLLEN: ICD-10-CM

## 2020-03-12 PROCEDURE — 95117 IMMUNOTHERAPY INJECTIONS: CPT | Mod: S$GLB,,, | Performed by: ALLERGY & IMMUNOLOGY

## 2020-03-12 PROCEDURE — 95117 PR IMMU2THERAPY, 2+ INJECTIONS: ICD-10-PCS | Mod: S$GLB,,, | Performed by: ALLERGY & IMMUNOLOGY

## 2020-03-12 NOTE — PROGRESS NOTES
Immunotherapy Note: Allergy Shot      Subjective:       Patient ID: Clive Guzman is a 64 y.o. male presents for weekly immunotherapy  Tolerated last injection  No New Medications  Beta Blockers: Held beta blocker for 24 hours    Reaction with last injection?: No  Are you on any beta blockers?: Yes  Has the vial ?: No    Are you ill today?: No  Asthma exacerbation or symptoms: No  Do you have a fever today?: No    Peak Flow: 500    Expiration Date #1: 01/15/20  Vial Concentration: 1:1 v/v (RED)  Dose (mL) #1: 0.1 mL  Location: Right upper arm  Given By: EL    Expiration Date #2: 01/15/20  Vial Concentration: 1:1 v/v (RED)  Dose (mL) #2: 0.1 mL  Location: Left upper arm  Given By : EL                           Objective:     Clive Guzman observed for 30 minutes and discharged in good condition        Discussion:     Pt understands the current recommendation. All questions answered to the best of my ability. Continue current management plan.      Electronically signed by Kori Garcia    Allergy/Imunology  [facility]  [facility]  [address]  [place] [address]   Office [phone]

## 2020-03-26 DIAGNOSIS — E78.5 HYPERLIPIDEMIA, UNSPECIFIED HYPERLIPIDEMIA TYPE: ICD-10-CM

## 2020-03-26 RX ORDER — FENOFIBRATE 160 MG/1
TABLET ORAL
Qty: 90 TABLET | Refills: 3 | Status: SHIPPED | OUTPATIENT
Start: 2020-03-26 | End: 2021-03-05

## 2020-04-09 ENCOUNTER — TELEPHONE (OUTPATIENT)
Dept: OPTOMETRY | Facility: CLINIC | Age: 65
End: 2020-04-09

## 2020-04-09 NOTE — TELEPHONE ENCOUNTER
Called pt to reschedule eye appt due to Dr. Loyola no longer with [facility].  Pt would like to wait to reschedule appt when feels safe to leave home do to covid-19.

## 2020-04-13 RX ORDER — ZOLMITRIPTAN 5 MG/1
SPRAY, METERED NASAL
Qty: 6 EACH | Refills: 5 | Status: SHIPPED | OUTPATIENT
Start: 2020-04-13 | End: 2021-01-27 | Stop reason: ALTCHOICE

## 2020-05-05 ENCOUNTER — PATIENT MESSAGE (OUTPATIENT)
Dept: ADMINISTRATIVE | Facility: HOSPITAL | Age: 65
End: 2020-05-05

## 2020-05-18 RX ORDER — AZELASTINE 1 MG/ML
SPRAY, METERED NASAL
Qty: 90 ML | Refills: 1 | Status: SHIPPED | OUTPATIENT
Start: 2020-05-18

## 2020-05-29 DIAGNOSIS — E11.9 TYPE 2 DIABETES MELLITUS WITHOUT COMPLICATION: ICD-10-CM

## 2020-07-20 ENCOUNTER — TELEPHONE (OUTPATIENT)
Dept: FAMILY MEDICINE | Facility: CLINIC | Age: 65
End: 2020-07-20

## 2020-07-20 DIAGNOSIS — G43.909 MIGRAINE WITHOUT STATUS MIGRAINOSUS, NOT INTRACTABLE, UNSPECIFIED MIGRAINE TYPE: Primary | ICD-10-CM

## 2020-07-20 NOTE — TELEPHONE ENCOUNTER
----- Message from Africa Santacruz sent at 7/20/2020  3:23 PM CDT -----  Type:  Patient Returning Call    Who Called:  Patient  Who Left Message for Patient:  Kevin  Does the patient know what this is regarding?:  yes  Best Call Back Number:    Additional Information:  No response on messenger.

## 2020-07-20 NOTE — TELEPHONE ENCOUNTER
Patient is in [place] and is having 3-4 migranes a week and would like a referral to a neurologist. Patient would like referral to go to [facility]# [phone] please advise.

## 2020-07-20 NOTE — TELEPHONE ENCOUNTER
----- Message from Orville Luis sent at 7/20/2020 11:36 AM CDT -----  Contact: patient  Type: Needs Medical Advice  Who Called:  patient  Symptoms (please be specific):    How long has patient had these symptoms:    Pharmacy name and phone #:    Best Call Back Number: [phone]  Additional Information: requesting a call back regarding referral for neurologist patient is out of town send referral to [facility]# [phone]

## 2020-07-24 DIAGNOSIS — E11.9 TYPE 2 DIABETES MELLITUS WITHOUT COMPLICATION: ICD-10-CM

## 2020-07-31 ENCOUNTER — TELEPHONE (OUTPATIENT)
Dept: FAMILY MEDICINE | Facility: CLINIC | Age: 65
End: 2020-07-31

## 2020-07-31 NOTE — TELEPHONE ENCOUNTER
----- Message from Nickolas Vanegas sent at 7/31/2020 11:25 AM CDT -----  Regarding: pt  Type: Needs Medical Advice    Who Called:  pt    Best Call Back Number: [phone]  Additional Information: pt asked for referral to on 7/22. Can see referral was approved by ins. Pt stated his Drs office has not received referral as of time of this message.  Pt is asking if can have emailed to him is in another [place]. Please call to assist getting to correct Drs office.

## 2020-08-07 ENCOUNTER — TELEPHONE (OUTPATIENT)
Dept: NEUROLOGY | Facility: CLINIC | Age: 65
End: 2020-08-07

## 2020-08-07 NOTE — TELEPHONE ENCOUNTER
Best contact number for patient:  [phone]    Emergency Contact name and number:  Reggie Lucio     [phone]         Referring provider and telephone number:  Moi Chirinos  [phone]    Primary Care Provider Name and if affiliated with [facility]:   Moi Chirinos - no    Reason for Appointment/Dx:  [id number] [facility]     Neurology Location patient would like to be seen: CV    Order received? YES                                                Records Received? YES    Have you ever seen another Neurologist?    NO  Workman's Comp/ Accident/ School  Information      Workman's Comp/Accident/School related?        NO             Appointment date:   09/28/2020  DR Teodora Rico  NP RAD[facility] Teague

## 2020-09-09 ENCOUNTER — CONSULT (OUTPATIENT)
Dept: NEUROLOGY | Facility: CLINIC | Age: 65
End: 2020-09-09
Payer: COMMERCIAL

## 2020-09-09 VITALS
WEIGHT: 241 LBS | HEART RATE: 68 BPM | HEIGHT: 68 IN | BODY MASS INDEX: 36.53 KG/M2 | SYSTOLIC BLOOD PRESSURE: 124 MMHG | TEMPERATURE: 98.6 F | DIASTOLIC BLOOD PRESSURE: 68 MMHG

## 2020-09-09 DIAGNOSIS — G43.009 MIGRAINE WITHOUT AURA AND WITHOUT STATUS MIGRAINOSUS, NOT INTRACTABLE: Primary | ICD-10-CM

## 2020-09-09 PROCEDURE — 99205 OFFICE O/P NEW HI 60 MIN: CPT | Performed by: PSYCHIATRY & NEUROLOGY

## 2020-09-09 RX ORDER — CHOLECALCIFEROL (VITAMIN D3) 125 MCG
2000 CAPSULE ORAL DAILY
COMMUNITY

## 2020-09-09 RX ORDER — AZELASTINE HYDROCHLORIDE 137 UG/1
137 SPRAY, METERED NASAL
COMMUNITY

## 2020-09-09 RX ORDER — OMEPRAZOLE 40 MG/1
40 CAPSULE, DELAYED RELEASE ORAL DAILY
COMMUNITY

## 2020-09-09 RX ORDER — ZOLMITRIPTAN 5 MG/1
5 TABLET, FILM COATED ORAL ONCE AS NEEDED
COMMUNITY
End: 2021-07-20 | Stop reason: SDUPTHER

## 2020-09-09 RX ORDER — NADOLOL 20 MG/1
20 TABLET ORAL DAILY
COMMUNITY

## 2020-09-09 RX ORDER — FENOFIBRATE 160 MG/1
160 TABLET ORAL DAILY
COMMUNITY
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
## 2020-09-09 NOTE — PATIENT INSTRUCTIONS
Please try and obtain MRI brain images on CD from wherever you last got them -you can call the medical records department of that hospital  And also trying get the radiology report  Try and obtain last EKG report as well  And last neurology note     Headache/migraine treatment:   - When you have a moderate to severe headache, you should seek rest, initiate relaxation and apply cold compresses to the head  Abortive medications (for immediate treatment of a headache): It is ok to take ibuprofen, acetaminophen or naproxen (Advil, Tylenol,  Aleve, Excedrin) if they help your headaches you should limit these to No more than 3 times a week to avoid medication overuse/rebound headaches  For your more moderate to severe migraines take this medication early   Zolmitriptan 5mg tabs - take one at the onset of headache  May repeat one time after 1-2 hours if pain has not resolved  (Max 2 a day and 9 a month)     Over the counter preventive supplements for headaches/migraines   (to take every day to help prevent headaches - not to take at the time of headache):  [x] Magnesium 400mg daily (If any diarrhea or upset stomach, decrease dose  as tolerated)    Prescription preventive medications for headaches/migraines   (to take every day to help prevent headaches - not to take at the time of headache):  [x] trial of emgality - 2 shots the first month and one shot every month after   Go on their website to look at videos       *Typically these types of medications take time untill you see the benefit, although some may see improvement in days, often it may take weeks, especially if the medication is being titrated up to a beneficial level  Please contact us if there are any concerns or questions regarding the medication  Lifestyle Recommendations:  [x] SLEEP - Maintain a regular sleep schedule: Adults need at least 7-8 hours of uninterrupted a night   Maintain good sleep hygiene:  Going to bed and waking up at consistent times, avoiding excessive daytime naps, avoiding caffeinated beverages in the evening, avoid excessive stimulation in the evening and generally using bed primarily for sleeping  One hour before bedtime would recommend turning lights down lower, decreasing your activity (may read quietly, listen to music at a low volume)  When you get into bed, should eliminate all technology (no texting, emailing, playing with your phone, iPad or tablet in bed)  [x] HYDRATION - Maintain good hydration  Drink  2L of fluid a day (4 typical small water bottles)  [x] DIET - Maintain good nutrition  In particular don't skip meals and try and eat healthy balanced meals regularly  [x] TRIGGERS - Look for other triggers and avoid them: Limit caffeine to 1-2 cups a day or less  Avoid dietary triggers that you have noticed bring on your headaches (this could include aged cheese, peanuts, MSG, aspartame and nitrates)  [x] EXERCISE - physical exercise as we all know is good for you in many ways, and not only is good for your heart, but also is beneficial for your mental health, cognitive health and  chronic pain/headaches  I would encourage at the least 5 days of physical exercise weekly for at least 30 minutes  Education and Follow-up  [x] Please call with any questions or concerns  Of course if any new concerning symptoms go to the emergency department    [x] Follow up in 3 months with a PA

## 2020-09-11 ENCOUNTER — TELEPHONE (OUTPATIENT)
Dept: NEUROLOGY | Facility: CLINIC | Age: 65
End: 2020-09-11

## 2020-09-11 NOTE — TELEPHONE ENCOUNTER
Received fax stating PA needed for emgality  ID [id number]  BIN [id number]  PCN [id number]  grp [id number]  phone# [phone]    Attempted to submit PA request on [facility]  "Patient authentication failed  The patient's zip code and/or phone number provided do not match our records  Please update the request and resubmit via ePA"    I called patient  He states he is in transition period and still works in [employer] and still has [place] address  Below address will be needed to be used for PA requests until patient officially moves to [facility] [address], [facility] [address]    Went back to [facility] and completed PA w above address  Key: [id number]    Approved through Start Date:09/11/2020; Coverage End Date:09/11/2021;

## 2020-10-05 ENCOUNTER — PATIENT MESSAGE (OUTPATIENT)
Dept: ADMINISTRATIVE | Facility: HOSPITAL | Age: 65
End: 2020-10-05

## 2020-12-09 DIAGNOSIS — G43.909 MIGRAINE WITHOUT STATUS MIGRAINOSUS, NOT INTRACTABLE, UNSPECIFIED MIGRAINE TYPE: ICD-10-CM

## 2020-12-09 DIAGNOSIS — K21.9 GASTROESOPHAGEAL REFLUX DISEASE: ICD-10-CM

## 2020-12-09 DIAGNOSIS — E11.8 TYPE 2 DIABETES MELLITUS WITH COMPLICATION, WITHOUT LONG-TERM CURRENT USE OF INSULIN: ICD-10-CM

## 2020-12-10 ENCOUNTER — PATIENT MESSAGE (OUTPATIENT)
Dept: FAMILY MEDICINE | Facility: CLINIC | Age: 65
End: 2020-12-10

## 2020-12-10 RX ORDER — METFORMIN HYDROCHLORIDE 500 MG/1
500 TABLET ORAL 2 TIMES DAILY WITH MEALS
Qty: 180 TABLET | Refills: 0 | Status: SHIPPED | OUTPATIENT
Start: 2020-12-10 | End: 2021-01-27 | Stop reason: SDUPTHER

## 2020-12-10 RX ORDER — OMEPRAZOLE 40 MG/1
40 CAPSULE, DELAYED RELEASE ORAL EVERY MORNING
Qty: 90 CAPSULE | Refills: 3 | Status: SHIPPED | OUTPATIENT
Start: 2020-12-10 | End: 2021-01-27 | Stop reason: SDUPTHER

## 2020-12-10 RX ORDER — NADOLOL 20 MG/1
20 TABLET ORAL DAILY
Qty: 90 TABLET | Refills: 0 | Status: SHIPPED | OUTPATIENT
Start: 2020-12-10 | End: 2021-01-27 | Stop reason: SDUPTHER

## 2020-12-16 ENCOUNTER — PATIENT MESSAGE (OUTPATIENT)
Dept: PULMONOLOGY | Facility: CLINIC | Age: 65
End: 2020-12-16

## 2020-12-18 ENCOUNTER — HOSPITAL ENCOUNTER (OUTPATIENT)
Dept: RADIOLOGY | Facility: HOSPITAL | Age: 65
Discharge: HOME | End: 2020-12-18
Attending: OTOLARYNGOLOGY
Payer: COMMERCIAL

## 2020-12-18 ENCOUNTER — OFFICE VISIT (OUTPATIENT)
Dept: OTOLARYNGOLOGY | Facility: CLINIC | Age: 65
End: 2020-12-18
Payer: COMMERCIAL

## 2020-12-18 ENCOUNTER — TELEPHONE (OUTPATIENT)
Dept: NEUROLOGY | Facility: CLINIC | Age: 65
End: 2020-12-18

## 2020-12-18 VITALS
OXYGEN SATURATION: 97 % | BODY MASS INDEX: 36.66 KG/M2 | SYSTOLIC BLOOD PRESSURE: 120 MMHG | HEIGHT: 68 IN | WEIGHT: 241.88 LBS | DIASTOLIC BLOOD PRESSURE: 60 MMHG | HEART RATE: 62 BPM

## 2020-12-18 DIAGNOSIS — J34.2 DEVIATED NASAL SEPTUM: Primary | ICD-10-CM

## 2020-12-18 DIAGNOSIS — J34.2 DEVIATED NASAL SEPTUM: ICD-10-CM

## 2020-12-18 PROCEDURE — 3288F FALL RISK ASSESSMENT DOCD: CPT | Mod: CPTII,S$GLB,, | Performed by: OTOLARYNGOLOGY

## 2020-12-18 PROCEDURE — 1101F PR PT FALLS ASSESS DOC 0-1 FALLS W/OUT INJ PAST YR: ICD-10-PCS | Mod: CPTII,S$GLB,, | Performed by: OTOLARYNGOLOGY

## 2020-12-18 PROCEDURE — 3008F PR BODY MASS INDEX (BMI) DOCUMENTED: ICD-10-PCS | Mod: CPTII,S$GLB,, | Performed by: OTOLARYNGOLOGY

## 2020-12-18 PROCEDURE — 1126F PR PAIN SEVERITY QUANTIFIED, NO PAIN PRESENT: ICD-10-PCS | Mod: S$GLB,,, | Performed by: OTOLARYNGOLOGY

## 2020-12-18 PROCEDURE — 70486 CT MAXILLOFACIAL W/O DYE: CPT | Mod: TC,PO

## 2020-12-18 PROCEDURE — 99203 PR OFFICE/OUTPT VISIT, NEW, LEVL III, 30-44 MIN: ICD-10-PCS | Mod: S$GLB,,, | Performed by: OTOLARYNGOLOGY

## 2020-12-18 PROCEDURE — 99203 OFFICE O/P NEW LOW 30 MIN: CPT | Mod: S$GLB,,, | Performed by: OTOLARYNGOLOGY

## 2020-12-18 PROCEDURE — 1126F AMNT PAIN NOTED NONE PRSNT: CPT | Mod: S$GLB,,, | Performed by: OTOLARYNGOLOGY

## 2020-12-18 PROCEDURE — 1101F PT FALLS ASSESS-DOCD LE1/YR: CPT | Mod: CPTII,S$GLB,, | Performed by: OTOLARYNGOLOGY

## 2020-12-18 PROCEDURE — 3008F BODY MASS INDEX DOCD: CPT | Mod: CPTII,S$GLB,, | Performed by: OTOLARYNGOLOGY

## 2020-12-18 PROCEDURE — 3288F PR FALLS RISK ASSESSMENT DOCUMENTED: ICD-10-PCS | Mod: CPTII,S$GLB,, | Performed by: OTOLARYNGOLOGY

## 2020-12-18 RX ORDER — A/SINGAPORE/GP1908/2015 IVR-180 (AN A/MICHIGAN/45/2015 (H1N1)PDM09-LIKE VIRUS, A/HONG KONG/4801/2014, NYMC X-263B (H3N2) (AN A/HONG KONG/4801/2014-LIKE VIRUS), AND B/BRISBANE/60/2008, WILD TYPE (A B/BRISBANE/60/2008-LIKE VIRUS) 15; 15; 15 UG/.5ML; UG/.5ML; UG/.5ML
INJECTION, SUSPENSION INTRAMUSCULAR
COMMUNITY
Start: 2020-10-14 | End: 2021-01-27 | Stop reason: ALTCHOICE

## 2020-12-18 RX ORDER — GALCANEZUMAB 120 MG/ML
INJECTION, SOLUTION SUBCUTANEOUS
COMMUNITY
Start: 2020-11-13

## 2020-12-18 NOTE — PROGRESS NOTES
Subjective:       Patient ID: Clive Guzman is a 65 y.o. male.    Chief Complaint: Establish Care (nose closes when laying down)    HPI     Clive Guzman is a 64 yo M presenting with nasal obstruction especially at night. Has been bothering him for the last 8-9 months. Always right sided, sometimes left sided. On asteline and flonase, which help postnasal drip, but not obstruction. Unable to wear CPAP as a result. Does not seem to be seasonal. No history of nasal trauma.    Review of Systems   Constitutional: Negative.  Negative for activity change, appetite change, fatigue, fever and unexpected weight change.   HENT: Positive for nasal congestion and postnasal drip. Negative for dental problem, ear discharge, ear pain, facial swelling, hearing loss, nosebleeds, rhinorrhea, sinus pressure/congestion, sneezing, sore throat, trouble swallowing and voice change.    Eyes: Positive for photophobia. Negative for pain and itching.   Respiratory: Positive for shortness of breath. Negative for apnea, cough, wheezing and stridor.    Cardiovascular: Negative.  Negative for chest pain and palpitations.   Gastrointestinal: Negative.  Negative for abdominal pain, diarrhea, nausea and vomiting.   Endocrine: Negative.  Negative for cold intolerance and heat intolerance.   Genitourinary: Negative.  Negative for bladder incontinence and dysuria.   Musculoskeletal: Negative.  Negative for arthralgias, myalgias, neck pain and neck stiffness.   Integumentary:  Negative for pallor, rash and mole/lesion. Negative.   Allergic/Immunologic: Negative for food allergies.   Neurological: Negative.  Negative for dizziness, vertigo, seizures, syncope and headaches.   Hematological: Negative.    Psychiatric/Behavioral: Negative.  Negative for behavioral problems and confusion.         Objective:      Physical Exam  Constitutional:       General: He is awake.      Appearance: Normal appearance. He is well-developed.   HENT:      Head:  Normocephalic and atraumatic.      Right Ear: Tympanic membrane, ear canal and external ear normal.      Left Ear: Tympanic membrane, ear canal and external ear normal.      Nose: Septal deviation (rightward) present.      Mouth/Throat:      Pharynx: Uvula midline.   Eyes:      General: Lids are normal. Vision grossly intact.      Conjunctiva/sclera: Conjunctivae normal.   Neck:      Musculoskeletal: Full passive range of motion without pain, normal range of motion and neck supple.      Thyroid: No thyroid mass or thyromegaly.   Pulmonary:      Effort: Pulmonary effort is normal. No tachypnea or respiratory distress.   Skin:     General: Skin is warm and dry.   Neurological:      Mental Status: He is alert and oriented to person, place, and time.   Psychiatric:         Mood and Affect: Mood and affect normal.         Assessment:       1. Deviated nasal septum        Plan:       CT scan of sinuses to further evaluate septum + sinuses  Will call patient with results, may benefit from septoplasty pending results

## 2020-12-20 ENCOUNTER — PATIENT OUTREACH (OUTPATIENT)
Dept: ADMINISTRATIVE | Facility: OTHER | Age: 65
End: 2020-12-20

## 2020-12-21 ENCOUNTER — OFFICE VISIT (OUTPATIENT)
Dept: OPHTHALMOLOGY | Facility: CLINIC | Age: 65
End: 2020-12-21
Payer: COMMERCIAL

## 2020-12-21 DIAGNOSIS — H25.13 NUCLEAR SCLEROTIC CATARACT, BILATERAL: ICD-10-CM

## 2020-12-21 DIAGNOSIS — H40.023 OPEN ANGLE WITH BORDERLINE FINDINGS, HIGH RISK, BILATERAL: Primary | ICD-10-CM

## 2020-12-21 PROCEDURE — 92020 PR SPECIAL EYE EVAL,GONIOSCOPY: ICD-10-PCS | Mod: S$GLB,,, | Performed by: OPHTHALMOLOGY

## 2020-12-21 PROCEDURE — 1126F AMNT PAIN NOTED NONE PRSNT: CPT | Mod: S$GLB,,, | Performed by: OPHTHALMOLOGY

## 2020-12-21 PROCEDURE — 92012 PR EYE EXAM, EST PATIENT,INTERMED: ICD-10-PCS | Mod: S$GLB,,, | Performed by: OPHTHALMOLOGY

## 2020-12-21 PROCEDURE — 92020 GONIOSCOPY: CPT | Mod: S$GLB,,, | Performed by: OPHTHALMOLOGY

## 2020-12-21 PROCEDURE — 99999 PR PBB SHADOW E&M-EST. PATIENT-LVL III: CPT | Mod: PBBFAC,,, | Performed by: OPHTHALMOLOGY

## 2020-12-21 PROCEDURE — 92133 POSTERIOR SEGMENT OCT OPTIC NERVE(OCULAR COHERENCE TOMOGRAPHY) - OU - BOTH EYES: ICD-10-PCS | Mod: S$GLB,,, | Performed by: OPHTHALMOLOGY

## 2020-12-21 PROCEDURE — 1126F PR PAIN SEVERITY QUANTIFIED, NO PAIN PRESENT: ICD-10-PCS | Mod: S$GLB,,, | Performed by: OPHTHALMOLOGY

## 2020-12-21 PROCEDURE — 92012 INTRM OPH EXAM EST PATIENT: CPT | Mod: S$GLB,,, | Performed by: OPHTHALMOLOGY

## 2020-12-21 PROCEDURE — 99999 PR PBB SHADOW E&M-EST. PATIENT-LVL III: ICD-10-PCS | Mod: PBBFAC,,, | Performed by: OPHTHALMOLOGY

## 2020-12-21 PROCEDURE — 92133 CPTRZD OPH DX IMG PST SGM ON: CPT | Mod: S$GLB,,, | Performed by: OPHTHALMOLOGY

## 2020-12-21 NOTE — PROGRESS NOTES
Chart was reviewed for overdue Proactive [facility] Encounters (EVAN)  topics  Updates were requested from care everywhere  Health Maintenance has been updated  [place] immunization registry triggered

## 2020-12-21 NOTE — PROGRESS NOTES
HPI     Overdue, IOP check & OCT Nerve. BG is controlled per pt. DM for 3 years.   Denies eye pain does n ot use any eye gtts currently. Will come back in   Feb for DM & MRX   Lab Results       Component                Value               Date                       HGBA1C                   5.9 (H)             11/09/2019                 HGBA1C                   6.0 (H)             05/04/2019                 HGBA1C                   5.9 (H)             06/05/2018            No results found for: LABA1C    Last edited by Tricia Murray on 12/21/2020  1:52 PM. (History)            Assessment /Plan     For exam results, see Encounter Report.    Open angle with borderline findings, high risk, bilateral    Nuclear sclerotic cataract, bilateral      1. Open angle with borderline findings, high risk, bilateral  +famhx (mother)  Thick pachy    IOP is high normal with some assymmetry OS>OD  ONH asymmetry OS>OD  OCT NFL is normal, but has progressed 2015->2020 OU (could be age related)  HVF have been normal    Okay to monitor off meds for now    F/u 5 months, HVF, dilate    2. Nuclear sclerotic cataract, bilateral  Nuclear sclerotic cataract - not visually significant. Observe.

## 2021-01-04 ENCOUNTER — TELEPHONE (OUTPATIENT)
Dept: SLEEP MEDICINE | Facility: CLINIC | Age: 66
End: 2021-01-04

## 2021-01-04 ENCOUNTER — TELEPHONE (OUTPATIENT)
Dept: NEUROLOGY | Facility: CLINIC | Age: 66
End: 2021-01-04

## 2021-01-04 ENCOUNTER — TELEMEDICINE (OUTPATIENT)
Dept: NEUROLOGY | Facility: CLINIC | Age: 66
End: 2021-01-04
Payer: COMMERCIAL

## 2021-01-04 VITALS — BODY MASS INDEX: 36.37 KG/M2 | WEIGHT: 240 LBS | HEIGHT: 68 IN

## 2021-01-04 DIAGNOSIS — G43.709 CHRONIC MIGRAINE WITHOUT AURA WITHOUT STATUS MIGRAINOSUS, NOT INTRACTABLE: Primary | ICD-10-CM

## 2021-01-04 PROCEDURE — 99213 OFFICE O/P EST LOW 20 MIN: CPT | Performed by: PHYSICIAN ASSISTANT

## 2021-01-04 NOTE — PATIENT INSTRUCTIONS
Please try and obtain MRI brain images on CD from wherever you last got them -you can call the medical records department of that hospital  And also trying get the radiology report  Try and obtain last EKG report as well  And last neurology note     Headache/migraine treatment:   - When you have a moderate to severe headache, you should seek rest, initiate relaxation and apply cold compresses to the head  Abortive medications (for immediate treatment of a headache): It is ok to take ibuprofen, acetaminophen or naproxen (Advil, Tylenol,  Aleve, Excedrin) if they help your headaches you should limit these to No more than 3 times a week to avoid medication overuse/rebound headaches  For your more moderate to severe migraines take this medication early   Zolmitriptan 5mg tabs - take one at the onset of headache  May repeat one time after 1-2 hours if pain has not resolved  (Max 2 a day and 9 a month)     Over the counter preventive supplements for headaches/migraines   (to take every day to help prevent headaches - not to take at the time of headache):  [x] Magnesium 400mg daily (If any diarrhea or upset stomach, decrease dose  as tolerated)    Prescription preventive medications for headaches/migraines   (to take every day to help prevent headaches - not to take at the time of headache):  [x] Emgality one shot every month   Go on their website to look at videos       *Typically these types of medications take time untill you see the benefit, although some may see improvement in days, often it may take weeks, especially if the medication is being titrated up to a beneficial level  Please contact us if there are any concerns or questions regarding the medication  Lifestyle Recommendations:  [x] SLEEP - Maintain a regular sleep schedule: Adults need at least 7-8 hours of uninterrupted a night   Maintain good sleep hygiene:  Going to bed and waking up at consistent times, avoiding excessive daytime naps, avoiding caffeinated beverages in the evening, avoid excessive stimulation in the evening and generally using bed primarily for sleeping  One hour before bedtime would recommend turning lights down lower, decreasing your activity (may read quietly, listen to music at a low volume)  When you get into bed, should eliminate all technology (no texting, emailing, playing with your phone, iPad or tablet in bed)  [x] HYDRATION - Maintain good hydration  Drink  2L of fluid a day (4 typical small water bottles)  [x] DIET - Maintain good nutrition  In particular don't skip meals and try and eat healthy balanced meals regularly  [x] TRIGGERS - Look for other triggers and avoid them: Limit caffeine to 1-2 cups a day or less  Avoid dietary triggers that you have noticed bring on your headaches (this could include aged cheese, peanuts, MSG, aspartame and nitrates)  [x] EXERCISE - physical exercise as we all know is good for you in many ways, and not only is good for your heart, but also is beneficial for your mental health, cognitive health and  chronic pain/headaches  I would encourage at the least 5 days of physical exercise weekly for at least 30 minutes  Education and Follow-up  [x] Please call with any questions or concerns  Of course if any new concerning symptoms go to the emergency department    [x] Follow up in 6 months with a PA
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
## 2021-01-04 NOTE — TELEPHONE ENCOUNTER
Called and spoke with the patient  Patient is scheduled for a follow-up with Breonna Rizo on 7/20/2021 at 830am in the [facility]  AVS and appointment card has been mailed to the patient's home on 1/4/2021

## 2021-01-04 NOTE — TELEPHONE ENCOUNTER
----- Message from Carmenza Donohue PA-C sent at 1/4/2021 10:03 AM EST -----  F/u with me in 6 months  Thanks

## 2021-01-27 ENCOUNTER — OFFICE VISIT (OUTPATIENT)
Dept: FAMILY MEDICINE | Facility: CLINIC | Age: 66
End: 2021-01-27
Payer: COMMERCIAL

## 2021-01-27 ENCOUNTER — LAB VISIT (OUTPATIENT)
Dept: LAB | Facility: HOSPITAL | Age: 66
End: 2021-01-27
Attending: FAMILY MEDICINE
Payer: COMMERCIAL

## 2021-01-27 VITALS
HEART RATE: 63 BPM | DIASTOLIC BLOOD PRESSURE: 78 MMHG | TEMPERATURE: 97 F | SYSTOLIC BLOOD PRESSURE: 120 MMHG | OXYGEN SATURATION: 96 % | RESPIRATION RATE: 16 BRPM | BODY MASS INDEX: 36.89 KG/M2 | HEIGHT: 68 IN | WEIGHT: 243.38 LBS

## 2021-01-27 DIAGNOSIS — G43.909 MIGRAINE WITHOUT STATUS MIGRAINOSUS, NOT INTRACTABLE, UNSPECIFIED MIGRAINE TYPE: ICD-10-CM

## 2021-01-27 DIAGNOSIS — E11.8 TYPE 2 DIABETES MELLITUS WITH COMPLICATION, WITHOUT LONG-TERM CURRENT USE OF INSULIN: ICD-10-CM

## 2021-01-27 DIAGNOSIS — K21.9 GASTROESOPHAGEAL REFLUX DISEASE: ICD-10-CM

## 2021-01-27 LAB
ALBUMIN SERPL BCP-MCNC: 4 G/DL (ref 3.5–5.2)
ALP SERPL-CCNC: 38 U/L (ref 55–135)
ALT SERPL W/O P-5'-P-CCNC: 29 U/L (ref 10–44)
ANION GAP SERPL CALC-SCNC: 8 MMOL/L (ref 8–16)
AST SERPL-CCNC: 19 U/L (ref 10–40)
BILIRUB SERPL-MCNC: 0.5 MG/DL (ref 0.1–1)
BUN SERPL-MCNC: 18 MG/DL (ref 8–23)
CALCIUM SERPL-MCNC: 9.3 MG/DL (ref 8.7–10.5)
CHLORIDE SERPL-SCNC: 103 MMOL/L (ref 95–110)
CHOLEST SERPL-MCNC: 212 MG/DL (ref 120–199)
CHOLEST/HDLC SERPL: 4.2 {RATIO} (ref 2–5)
CO2 SERPL-SCNC: 30 MMOL/L (ref 23–29)
CREAT SERPL-MCNC: 0.9 MG/DL (ref 0.5–1.4)
EST. GFR  (AFRICAN AMERICAN): >60 ML/MIN/1.73 M^2
EST. GFR  (NON AFRICAN AMERICAN): >60 ML/MIN/1.73 M^2
ESTIMATED AVG GLUCOSE: 126 MG/DL (ref 68–131)
GLUCOSE SERPL-MCNC: 118 MG/DL (ref 70–110)
HBA1C MFR BLD HPLC: 6 % (ref 4–5.6)
HDLC SERPL-MCNC: 50 MG/DL (ref 40–75)
HDLC SERPL: 23.6 % (ref 20–50)
LDLC SERPL CALC-MCNC: 134 MG/DL (ref 63–159)
NONHDLC SERPL-MCNC: 162 MG/DL
POTASSIUM SERPL-SCNC: 4.4 MMOL/L (ref 3.5–5.1)
PROT SERPL-MCNC: 7.4 G/DL (ref 6–8.4)
SODIUM SERPL-SCNC: 141 MMOL/L (ref 136–145)
TRIGL SERPL-MCNC: 140 MG/DL (ref 30–150)

## 2021-01-27 PROCEDURE — 3044F PR MOST RECENT HEMOGLOBIN A1C LEVEL <7.0%: ICD-10-PCS | Mod: CPTII,S$GLB,, | Performed by: FAMILY MEDICINE

## 2021-01-27 PROCEDURE — 83036 HEMOGLOBIN GLYCOSYLATED A1C: CPT

## 2021-01-27 PROCEDURE — 3288F FALL RISK ASSESSMENT DOCD: CPT | Mod: CPTII,S$GLB,, | Performed by: FAMILY MEDICINE

## 2021-01-27 PROCEDURE — 1126F PR PAIN SEVERITY QUANTIFIED, NO PAIN PRESENT: ICD-10-PCS | Mod: S$GLB,,, | Performed by: FAMILY MEDICINE

## 2021-01-27 PROCEDURE — 3288F PR FALLS RISK ASSESSMENT DOCUMENTED: ICD-10-PCS | Mod: CPTII,S$GLB,, | Performed by: FAMILY MEDICINE

## 2021-01-27 PROCEDURE — 1101F PR PT FALLS ASSESS DOC 0-1 FALLS W/OUT INJ PAST YR: ICD-10-PCS | Mod: CPTII,S$GLB,, | Performed by: FAMILY MEDICINE

## 2021-01-27 PROCEDURE — 90670 PCV13 VACCINE IM: CPT | Mod: S$GLB,,, | Performed by: FAMILY MEDICINE

## 2021-01-27 PROCEDURE — 1101F PT FALLS ASSESS-DOCD LE1/YR: CPT | Mod: CPTII,S$GLB,, | Performed by: FAMILY MEDICINE

## 2021-01-27 PROCEDURE — 90670 PNEUMOCOCCAL CONJUGATE VACCINE 13-VALENT LESS THAN 5YO & GREATER THAN: ICD-10-PCS | Mod: S$GLB,,, | Performed by: FAMILY MEDICINE

## 2021-01-27 PROCEDURE — 99999 PR PBB SHADOW E&M-EST. PATIENT-LVL V: ICD-10-PCS | Mod: PBBFAC,,, | Performed by: FAMILY MEDICINE

## 2021-01-27 PROCEDURE — 1126F AMNT PAIN NOTED NONE PRSNT: CPT | Mod: S$GLB,,, | Performed by: FAMILY MEDICINE

## 2021-01-27 PROCEDURE — 80061 LIPID PANEL: CPT

## 2021-01-27 PROCEDURE — 99397 PER PM REEVAL EST PAT 65+ YR: CPT | Mod: 25,S$GLB,, | Performed by: FAMILY MEDICINE

## 2021-01-27 PROCEDURE — 90471 IMMUNIZATION ADMIN: CPT | Mod: S$GLB,,, | Performed by: FAMILY MEDICINE

## 2021-01-27 PROCEDURE — 3008F PR BODY MASS INDEX (BMI) DOCUMENTED: ICD-10-PCS | Mod: CPTII,S$GLB,, | Performed by: FAMILY MEDICINE

## 2021-01-27 PROCEDURE — 36415 COLL VENOUS BLD VENIPUNCTURE: CPT | Mod: PO

## 2021-01-27 PROCEDURE — 3008F BODY MASS INDEX DOCD: CPT | Mod: CPTII,S$GLB,, | Performed by: FAMILY MEDICINE

## 2021-01-27 PROCEDURE — 99999 PR PBB SHADOW E&M-EST. PATIENT-LVL V: CPT | Mod: PBBFAC,,, | Performed by: FAMILY MEDICINE

## 2021-01-27 PROCEDURE — 80053 COMPREHEN METABOLIC PANEL: CPT

## 2021-01-27 PROCEDURE — 90471 PNEUMOCOCCAL CONJUGATE VACCINE 13-VALENT LESS THAN 5YO & GREATER THAN: ICD-10-PCS | Mod: S$GLB,,, | Performed by: FAMILY MEDICINE

## 2021-01-27 PROCEDURE — 99397 PR PREVENTIVE VISIT,EST,65 & OVER: ICD-10-PCS | Mod: 25,S$GLB,, | Performed by: FAMILY MEDICINE

## 2021-01-27 PROCEDURE — 3044F HG A1C LEVEL LT 7.0%: CPT | Mod: CPTII,S$GLB,, | Performed by: FAMILY MEDICINE

## 2021-01-27 RX ORDER — SILDENAFIL 50 MG/1
50 TABLET, FILM COATED ORAL DAILY PRN
Qty: 30 TABLET | Refills: 1 | Status: SHIPPED | OUTPATIENT
Start: 2021-01-27 | End: 2021-01-29 | Stop reason: SDUPTHER

## 2021-01-27 RX ORDER — OMEPRAZOLE 40 MG/1
40 CAPSULE, DELAYED RELEASE ORAL EVERY MORNING
Qty: 90 CAPSULE | Refills: 3 | Status: SHIPPED | OUTPATIENT
Start: 2021-01-27 | End: 2022-03-11

## 2021-01-27 RX ORDER — NADOLOL 20 MG/1
20 TABLET ORAL DAILY
Qty: 90 TABLET | Refills: 3 | Status: SHIPPED | OUTPATIENT
Start: 2021-01-27 | End: 2022-03-03

## 2021-01-27 RX ORDER — AMOXICILLIN 500 MG
1200 CAPSULE ORAL
COMMUNITY

## 2021-01-27 RX ORDER — METFORMIN HYDROCHLORIDE 500 MG/1
500 TABLET ORAL 2 TIMES DAILY WITH MEALS
Qty: 180 TABLET | Refills: 3 | Status: SHIPPED | OUTPATIENT
Start: 2021-01-27 | End: 2022-03-03

## 2021-01-29 ENCOUNTER — PATIENT MESSAGE (OUTPATIENT)
Dept: FAMILY MEDICINE | Facility: CLINIC | Age: 66
End: 2021-01-29

## 2021-02-04 DIAGNOSIS — N52.9 ERECTILE DYSFUNCTION, UNSPECIFIED ERECTILE DYSFUNCTION TYPE: Primary | ICD-10-CM

## 2021-02-04 RX ORDER — SILDENAFIL 50 MG/1
50 TABLET, FILM COATED ORAL DAILY PRN
Qty: 30 TABLET | Refills: 1 | Status: SHIPPED | OUTPATIENT
Start: 2021-02-04 | End: 2022-02-04

## 2021-03-22 ENCOUNTER — PATIENT OUTREACH (OUTPATIENT)
Dept: ADMINISTRATIVE | Facility: OTHER | Age: 66
End: 2021-03-22

## 2021-03-23 ENCOUNTER — TELEPHONE (OUTPATIENT)
Dept: SLEEP MEDICINE | Facility: CLINIC | Age: 66
End: 2021-03-23

## 2021-03-24 ENCOUNTER — OFFICE VISIT (OUTPATIENT)
Dept: SLEEP MEDICINE | Facility: CLINIC | Age: 66
End: 2021-03-24
Payer: COMMERCIAL

## 2021-03-24 VITALS
SYSTOLIC BLOOD PRESSURE: 122 MMHG | BODY MASS INDEX: 37.01 KG/M2 | HEIGHT: 68 IN | HEART RATE: 62 BPM | DIASTOLIC BLOOD PRESSURE: 73 MMHG

## 2021-03-24 DIAGNOSIS — G47.33 OSA (OBSTRUCTIVE SLEEP APNEA): Primary | ICD-10-CM

## 2021-03-24 PROCEDURE — 99999 PR PBB SHADOW E&M-EST. PATIENT-LVL IV: ICD-10-PCS | Mod: PBBFAC,,, | Performed by: PSYCHIATRY & NEUROLOGY

## 2021-03-24 PROCEDURE — 3008F BODY MASS INDEX DOCD: CPT | Mod: CPTII,S$GLB,, | Performed by: PSYCHIATRY & NEUROLOGY

## 2021-03-24 PROCEDURE — 99999 PR PBB SHADOW E&M-EST. PATIENT-LVL IV: CPT | Mod: PBBFAC,,, | Performed by: PSYCHIATRY & NEUROLOGY

## 2021-03-24 PROCEDURE — 1126F AMNT PAIN NOTED NONE PRSNT: CPT | Mod: S$GLB,,, | Performed by: PSYCHIATRY & NEUROLOGY

## 2021-03-24 PROCEDURE — 1126F PR PAIN SEVERITY QUANTIFIED, NO PAIN PRESENT: ICD-10-PCS | Mod: S$GLB,,, | Performed by: PSYCHIATRY & NEUROLOGY

## 2021-03-24 PROCEDURE — 99214 OFFICE O/P EST MOD 30 MIN: CPT | Mod: S$GLB,,, | Performed by: PSYCHIATRY & NEUROLOGY

## 2021-03-24 PROCEDURE — 3288F PR FALLS RISK ASSESSMENT DOCUMENTED: ICD-10-PCS | Mod: CPTII,S$GLB,, | Performed by: PSYCHIATRY & NEUROLOGY

## 2021-03-24 PROCEDURE — 1101F PR PT FALLS ASSESS DOC 0-1 FALLS W/OUT INJ PAST YR: ICD-10-PCS | Mod: CPTII,S$GLB,, | Performed by: PSYCHIATRY & NEUROLOGY

## 2021-03-24 PROCEDURE — 3008F PR BODY MASS INDEX (BMI) DOCUMENTED: ICD-10-PCS | Mod: CPTII,S$GLB,, | Performed by: PSYCHIATRY & NEUROLOGY

## 2021-03-24 PROCEDURE — 1101F PT FALLS ASSESS-DOCD LE1/YR: CPT | Mod: CPTII,S$GLB,, | Performed by: PSYCHIATRY & NEUROLOGY

## 2021-03-24 PROCEDURE — 99214 PR OFFICE/OUTPT VISIT, EST, LEVL IV, 30-39 MIN: ICD-10-PCS | Mod: S$GLB,,, | Performed by: PSYCHIATRY & NEUROLOGY

## 2021-03-24 PROCEDURE — 3288F FALL RISK ASSESSMENT DOCD: CPT | Mod: CPTII,S$GLB,, | Performed by: PSYCHIATRY & NEUROLOGY

## 2021-03-29 ENCOUNTER — TELEPHONE (OUTPATIENT)
Dept: NEUROLOGY | Facility: CLINIC | Age: 66
End: 2021-03-29

## 2021-03-29 NOTE — TELEPHONE ENCOUNTER
Called patient and I made him aware of the change in his appointment time for 07/20/21 from 08:30am to 08:15am

## 2021-04-01 ENCOUNTER — TELEPHONE (OUTPATIENT)
Dept: SLEEP MEDICINE | Facility: OTHER | Age: 66
End: 2021-04-01

## 2021-04-14 ENCOUNTER — TELEPHONE (OUTPATIENT)
Dept: SLEEP MEDICINE | Facility: OTHER | Age: 66
End: 2021-04-14

## 2021-05-03 ENCOUNTER — TELEPHONE (OUTPATIENT)
Dept: SLEEP MEDICINE | Facility: OTHER | Age: 66
End: 2021-05-03

## 2021-05-18 ENCOUNTER — TELEPHONE (OUTPATIENT)
Dept: SLEEP MEDICINE | Facility: OTHER | Age: 66
End: 2021-05-18

## 2021-06-14 ENCOUNTER — TELEPHONE (OUTPATIENT)
Dept: SLEEP MEDICINE | Facility: OTHER | Age: 66
End: 2021-06-14

## 2021-07-20 ENCOUNTER — TELEMEDICINE (OUTPATIENT)
Dept: NEUROLOGY | Facility: CLINIC | Age: 66
End: 2021-07-20
Payer: COMMERCIAL

## 2021-07-20 DIAGNOSIS — G43.009 MIGRAINE WITHOUT AURA AND WITHOUT STATUS MIGRAINOSUS, NOT INTRACTABLE: ICD-10-CM

## 2021-07-20 PROCEDURE — 99214 OFFICE O/P EST MOD 30 MIN: CPT | Performed by: PHYSICIAN ASSISTANT

## 2021-07-20 RX ORDER — ZOLMITRIPTAN 5 MG/1
1 SPRAY NASAL AS NEEDED
Qty: 6 ML | Refills: 3 | Status: SHIPPED | OUTPATIENT
Start: 2021-07-20 | End: 2021-07-26 | Stop reason: SDUPTHER

## 2021-07-20 RX ORDER — ZOLMITRIPTAN 5 MG/1
5 TABLET, FILM COATED ORAL ONCE AS NEEDED
Qty: 6 TABLET | Refills: 6 | Status: SHIPPED | OUTPATIENT
Start: 2021-07-20
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
## 2021-07-20 NOTE — PATIENT INSTRUCTIONS
Headache/migraine treatment:   - When you have a moderate to severe headache, you should seek rest, initiate relaxation and apply cold compresses to the head       Abortive medications (for immediate treatment of a headache): It is ok to take ibuprofen, acetaminophen or naproxen (Advil, Tylenol,  Aleve, Excedrin) if they help your headaches you should limit these to No more than 3 times a week to avoid medication overuse/rebound headaches       For your more moderate to severe migraines take this medication early   Zolmitriptan 5mg tabs - take one at the onset of headache  May repeat one time after 1-2 hours if pain has not resolved  (Max 2 a day and 9 a month) May use spray if warranted instead of tablet     Over the counter preventive supplements for headaches/migraines   (to take every day to help prevent headaches - not to take at the time of headache):  [x]? ? Magnesium 400mg daily (If any diarrhea or upset stomach, decrease dose  as tolerated)     Prescription preventive medications for headaches/migraines   (to take every day to help prevent headaches - not to take at the time of headache):  [x]? ? Emgality 1 injection every 30 day        *Typically these types of medications take time untill you see the benefit, although some may see improvement in days, often it may take weeks, especially if the medication is being titrated up to a beneficial level  Please contact us if there are any concerns or questions regarding the medication       Lifestyle Recommendations:  [x]? ? SLEEP - Maintain a regular sleep schedule: Adults need at least 7-8 hours of uninterrupted a night   Maintain good sleep hygiene:  Going to bed and waking up at consistent times, avoiding excessive daytime naps, avoiding caffeinated beverages in the evening, avoid excessive stimulation in the evening and generally using bed primarily for sleeping   One hour before bedtime would recommend turning lights down lower, decreasing your activity (may read quietly, listen to music at a low volume)  When you get into bed, should eliminate all technology (no texting, emailing, playing with your phone, iPad or tablet in bed)  [x]?? HYDRATION - Maintain good hydration   Drink  2L of fluid a day (4 typical small water bottles)  [x]? ? DIET - Maintain good nutrition  In particular don't skip meals and try and eat healthy balanced meals regularly  [x]? ? TRIGGERS - Look for other triggers and avoid them: Limit caffeine to 1-2 cups a day or less  Avoid dietary triggers that you have noticed bring on your headaches (this could include aged cheese, peanuts, MSG, aspartame and nitrates)  [x]? ? EXERCISE - physical exercise as we all know is good for you in many ways, and not only is good for your heart, but also is beneficial for your mental health, cognitive health and  chronic pain/headaches  I would encourage at the least 5 days of physical exercise weekly for at least 30 minutes       Education and Follow-up  [x]? ? Please call with any questions or concerns  Of course if any new concerning symptoms go to the emergency department  [x]? ? Follow up in 6 months

## 2021-07-26 DIAGNOSIS — G43.009 MIGRAINE WITHOUT AURA AND WITHOUT STATUS MIGRAINOSUS, NOT INTRACTABLE: ICD-10-CM

## 2021-07-26 RX ORDER — ZOLMITRIPTAN 5 MG/1
1 SPRAY NASAL AS NEEDED
Qty: 6 ML | Refills: 3 | OUTPATIENT
Start: 2021-07-26

## 2021-07-26 NOTE — TELEPHONE ENCOUNTER
received fax from pharm that zolmitriptan NS is not covered  prefered alternative is sumatriptan succinate, naritriptan, rizatriptan, almotriptan, frovatriptan, zolmitriptan  Started PA on [facility]  [facility]  Key: [id number]  Per PA request, zomig name brand may be covered  I spoke to Dwight and she is ok with brand zomig  Called pharm and his system will not allow him to run for brand unless he has a new script  rx called in to pharmacist for brand  He will call us back if any issues  I entered new script for BERT    Please sign off

## 2021-08-04 ENCOUNTER — PATIENT MESSAGE (OUTPATIENT)
Dept: ADMINISTRATIVE | Facility: HOSPITAL | Age: 66
End: 2021-08-04

## 2021-08-04 DIAGNOSIS — E11.9 TYPE 2 DIABETES MELLITUS WITHOUT COMPLICATION: ICD-10-CM

## 2021-08-18 ENCOUNTER — OFFICE VISIT (OUTPATIENT)
Dept: FAMILY MEDICINE | Facility: CLINIC | Age: 66
End: 2021-08-18
Payer: COMMERCIAL

## 2021-08-18 ENCOUNTER — TELEPHONE (OUTPATIENT)
Dept: SLEEP MEDICINE | Facility: OTHER | Age: 66
End: 2021-08-18

## 2021-08-18 ENCOUNTER — TELEPHONE (OUTPATIENT)
Dept: FAMILY MEDICINE | Facility: CLINIC | Age: 66
End: 2021-08-18

## 2021-08-18 VITALS
HEIGHT: 68 IN | BODY MASS INDEX: 36.29 KG/M2 | HEART RATE: 52 BPM | TEMPERATURE: 98 F | OXYGEN SATURATION: 97 % | WEIGHT: 239.44 LBS | DIASTOLIC BLOOD PRESSURE: 60 MMHG | SYSTOLIC BLOOD PRESSURE: 100 MMHG

## 2021-08-18 DIAGNOSIS — E11.8 TYPE 2 DIABETES MELLITUS WITH COMPLICATION, WITHOUT LONG-TERM CURRENT USE OF INSULIN: ICD-10-CM

## 2021-08-18 PROCEDURE — 1126F AMNT PAIN NOTED NONE PRSNT: CPT | Mod: CPTII,S$GLB,, | Performed by: FAMILY MEDICINE

## 2021-08-18 PROCEDURE — 90471 TDAP VACCINE GREATER THAN OR EQUAL TO 7YO IM: ICD-10-PCS | Mod: S$GLB,,, | Performed by: FAMILY MEDICINE

## 2021-08-18 PROCEDURE — 3044F HG A1C LEVEL LT 7.0%: CPT | Mod: CPTII,S$GLB,, | Performed by: FAMILY MEDICINE

## 2021-08-18 PROCEDURE — 99213 PR OFFICE/OUTPT VISIT, EST, LEVL III, 20-29 MIN: ICD-10-PCS | Mod: 25,S$GLB,, | Performed by: FAMILY MEDICINE

## 2021-08-18 PROCEDURE — 3078F PR MOST RECENT DIASTOLIC BLOOD PRESSURE < 80 MM HG: ICD-10-PCS | Mod: CPTII,S$GLB,, | Performed by: FAMILY MEDICINE

## 2021-08-18 PROCEDURE — 90715 TDAP VACCINE GREATER THAN OR EQUAL TO 7YO IM: ICD-10-PCS | Mod: S$GLB,,, | Performed by: FAMILY MEDICINE

## 2021-08-18 PROCEDURE — 90715 TDAP VACCINE 7 YRS/> IM: CPT | Mod: S$GLB,,, | Performed by: FAMILY MEDICINE

## 2021-08-18 PROCEDURE — 3008F BODY MASS INDEX DOCD: CPT | Mod: CPTII,S$GLB,, | Performed by: FAMILY MEDICINE

## 2021-08-18 PROCEDURE — 3288F PR FALLS RISK ASSESSMENT DOCUMENTED: ICD-10-PCS | Mod: CPTII,S$GLB,, | Performed by: FAMILY MEDICINE

## 2021-08-18 PROCEDURE — 99999 PR PBB SHADOW E&M-EST. PATIENT-LVL III: ICD-10-PCS | Mod: PBBFAC,,, | Performed by: FAMILY MEDICINE

## 2021-08-18 PROCEDURE — 3074F PR MOST RECENT SYSTOLIC BLOOD PRESSURE < 130 MM HG: ICD-10-PCS | Mod: CPTII,S$GLB,, | Performed by: FAMILY MEDICINE

## 2021-08-18 PROCEDURE — 1126F PR PAIN SEVERITY QUANTIFIED, NO PAIN PRESENT: ICD-10-PCS | Mod: CPTII,S$GLB,, | Performed by: FAMILY MEDICINE

## 2021-08-18 PROCEDURE — 3044F PR MOST RECENT HEMOGLOBIN A1C LEVEL <7.0%: ICD-10-PCS | Mod: CPTII,S$GLB,, | Performed by: FAMILY MEDICINE

## 2021-08-18 PROCEDURE — 3074F SYST BP LT 130 MM HG: CPT | Mod: CPTII,S$GLB,, | Performed by: FAMILY MEDICINE

## 2021-08-18 PROCEDURE — 3288F FALL RISK ASSESSMENT DOCD: CPT | Mod: CPTII,S$GLB,, | Performed by: FAMILY MEDICINE

## 2021-08-18 PROCEDURE — 99999 PR PBB SHADOW E&M-EST. PATIENT-LVL III: CPT | Mod: PBBFAC,,, | Performed by: FAMILY MEDICINE

## 2021-08-18 PROCEDURE — 3078F DIAST BP <80 MM HG: CPT | Mod: CPTII,S$GLB,, | Performed by: FAMILY MEDICINE

## 2021-08-18 PROCEDURE — 90471 IMMUNIZATION ADMIN: CPT | Mod: S$GLB,,, | Performed by: FAMILY MEDICINE

## 2021-08-18 PROCEDURE — 1159F PR MEDICATION LIST DOCUMENTED IN MEDICAL RECORD: ICD-10-PCS | Mod: CPTII,S$GLB,, | Performed by: FAMILY MEDICINE

## 2021-08-18 PROCEDURE — 99213 OFFICE O/P EST LOW 20 MIN: CPT | Mod: 25,S$GLB,, | Performed by: FAMILY MEDICINE

## 2021-08-18 PROCEDURE — 1159F MED LIST DOCD IN RCRD: CPT | Mod: CPTII,S$GLB,, | Performed by: FAMILY MEDICINE

## 2021-08-18 PROCEDURE — 1101F PT FALLS ASSESS-DOCD LE1/YR: CPT | Mod: CPTII,S$GLB,, | Performed by: FAMILY MEDICINE

## 2021-08-18 PROCEDURE — 3008F PR BODY MASS INDEX (BMI) DOCUMENTED: ICD-10-PCS | Mod: CPTII,S$GLB,, | Performed by: FAMILY MEDICINE

## 2021-08-18 PROCEDURE — 1101F PR PT FALLS ASSESS DOC 0-1 FALLS W/OUT INJ PAST YR: ICD-10-PCS | Mod: CPTII,S$GLB,, | Performed by: FAMILY MEDICINE

## 2021-08-20 ENCOUNTER — LAB VISIT (OUTPATIENT)
Dept: LAB | Facility: HOSPITAL | Age: 66
End: 2021-08-20
Attending: FAMILY MEDICINE
Payer: COMMERCIAL

## 2021-08-20 DIAGNOSIS — E11.8 TYPE 2 DIABETES MELLITUS WITH COMPLICATION, WITHOUT LONG-TERM CURRENT USE OF INSULIN: ICD-10-CM

## 2021-08-20 LAB
ALBUMIN SERPL BCP-MCNC: 3.7 G/DL (ref 3.5–5.2)
ALP SERPL-CCNC: 31 U/L (ref 55–135)
ALT SERPL W/O P-5'-P-CCNC: 31 U/L (ref 10–44)
ANION GAP SERPL CALC-SCNC: 7 MMOL/L (ref 8–16)
AST SERPL-CCNC: 21 U/L (ref 10–40)
BILIRUB SERPL-MCNC: 0.5 MG/DL (ref 0.1–1)
BUN SERPL-MCNC: 17 MG/DL (ref 8–23)
CALCIUM SERPL-MCNC: 9.4 MG/DL (ref 8.7–10.5)
CHLORIDE SERPL-SCNC: 102 MMOL/L (ref 95–110)
CO2 SERPL-SCNC: 27 MMOL/L (ref 23–29)
CREAT SERPL-MCNC: 0.9 MG/DL (ref 0.5–1.4)
EST. GFR  (AFRICAN AMERICAN): >60 ML/MIN/1.73 M^2
EST. GFR  (NON AFRICAN AMERICAN): >60 ML/MIN/1.73 M^2
ESTIMATED AVG GLUCOSE: 126 MG/DL (ref 68–131)
GLUCOSE SERPL-MCNC: 117 MG/DL (ref 70–110)
HBA1C MFR BLD: 6 % (ref 4–5.6)
POTASSIUM SERPL-SCNC: 4.3 MMOL/L (ref 3.5–5.1)
PROT SERPL-MCNC: 6.9 G/DL (ref 6–8.4)
SODIUM SERPL-SCNC: 136 MMOL/L (ref 136–145)

## 2021-08-20 PROCEDURE — 80053 COMPREHEN METABOLIC PANEL: CPT | Performed by: FAMILY MEDICINE

## 2021-08-20 PROCEDURE — 36415 COLL VENOUS BLD VENIPUNCTURE: CPT | Mod: PO | Performed by: FAMILY MEDICINE

## 2021-08-20 PROCEDURE — 83036 HEMOGLOBIN GLYCOSYLATED A1C: CPT | Performed by: FAMILY MEDICINE

## 2021-08-25 ENCOUNTER — OFFICE VISIT (OUTPATIENT)
Dept: OPHTHALMOLOGY | Facility: CLINIC | Age: 66
End: 2021-08-25
Payer: COMMERCIAL

## 2021-08-25 DIAGNOSIS — H25.13 NUCLEAR SCLEROTIC CATARACT, BILATERAL: ICD-10-CM

## 2021-08-25 DIAGNOSIS — H40.023 OPEN ANGLE WITH BORDERLINE FINDINGS, HIGH RISK, BILATERAL: Primary | ICD-10-CM

## 2021-08-25 DIAGNOSIS — E11.9 DIABETES MELLITUS TYPE 2 WITHOUT RETINOPATHY: ICD-10-CM

## 2021-08-25 PROCEDURE — 1101F PR PT FALLS ASSESS DOC 0-1 FALLS W/OUT INJ PAST YR: ICD-10-PCS | Mod: CPTII,S$GLB,, | Performed by: OPHTHALMOLOGY

## 2021-08-25 PROCEDURE — 99999 PR PBB SHADOW E&M-EST. PATIENT-LVL III: ICD-10-PCS | Mod: PBBFAC,,, | Performed by: OPHTHALMOLOGY

## 2021-08-25 PROCEDURE — 3288F PR FALLS RISK ASSESSMENT DOCUMENTED: ICD-10-PCS | Mod: CPTII,S$GLB,, | Performed by: OPHTHALMOLOGY

## 2021-08-25 PROCEDURE — 3044F HG A1C LEVEL LT 7.0%: CPT | Mod: CPTII,S$GLB,, | Performed by: OPHTHALMOLOGY

## 2021-08-25 PROCEDURE — 1101F PT FALLS ASSESS-DOCD LE1/YR: CPT | Mod: CPTII,S$GLB,, | Performed by: OPHTHALMOLOGY

## 2021-08-25 PROCEDURE — 92014 COMPRE OPH EXAM EST PT 1/>: CPT | Mod: S$GLB,,, | Performed by: OPHTHALMOLOGY

## 2021-08-25 PROCEDURE — 1160F PR REVIEW ALL MEDS BY PRESCRIBER/CLIN PHARMACIST DOCUMENTED: ICD-10-PCS | Mod: CPTII,S$GLB,, | Performed by: OPHTHALMOLOGY

## 2021-08-25 PROCEDURE — 1160F RVW MEDS BY RX/DR IN RCRD: CPT | Mod: CPTII,S$GLB,, | Performed by: OPHTHALMOLOGY

## 2021-08-25 PROCEDURE — 92014 PR EYE EXAM, EST PATIENT,COMPREHESV: ICD-10-PCS | Mod: S$GLB,,, | Performed by: OPHTHALMOLOGY

## 2021-08-25 PROCEDURE — 3288F FALL RISK ASSESSMENT DOCD: CPT | Mod: CPTII,S$GLB,, | Performed by: OPHTHALMOLOGY

## 2021-08-25 PROCEDURE — 1126F AMNT PAIN NOTED NONE PRSNT: CPT | Mod: CPTII,S$GLB,, | Performed by: OPHTHALMOLOGY

## 2021-08-25 PROCEDURE — 1159F MED LIST DOCD IN RCRD: CPT | Mod: CPTII,S$GLB,, | Performed by: OPHTHALMOLOGY

## 2021-08-25 PROCEDURE — 1159F PR MEDICATION LIST DOCUMENTED IN MEDICAL RECORD: ICD-10-PCS | Mod: CPTII,S$GLB,, | Performed by: OPHTHALMOLOGY

## 2021-08-25 PROCEDURE — 3044F PR MOST RECENT HEMOGLOBIN A1C LEVEL <7.0%: ICD-10-PCS | Mod: CPTII,S$GLB,, | Performed by: OPHTHALMOLOGY

## 2021-08-25 PROCEDURE — 1126F PR PAIN SEVERITY QUANTIFIED, NO PAIN PRESENT: ICD-10-PCS | Mod: CPTII,S$GLB,, | Performed by: OPHTHALMOLOGY

## 2021-08-25 PROCEDURE — 2023F PR DILATED RETINAL EXAM W/O EVID OF RETINOPATHY: ICD-10-PCS | Mod: CPTII,S$GLB,, | Performed by: OPHTHALMOLOGY

## 2021-08-25 PROCEDURE — 2023F DILAT RTA XM W/O RTNOPTHY: CPT | Mod: CPTII,S$GLB,, | Performed by: OPHTHALMOLOGY

## 2021-08-25 PROCEDURE — 99999 PR PBB SHADOW E&M-EST. PATIENT-LVL III: CPT | Mod: PBBFAC,,, | Performed by: OPHTHALMOLOGY

## 2021-09-20 ENCOUNTER — CLINICAL SUPPORT (OUTPATIENT)
Dept: OPHTHALMOLOGY | Facility: CLINIC | Age: 66
End: 2021-09-20
Payer: COMMERCIAL

## 2021-09-20 DIAGNOSIS — H40.023 OPEN ANGLE WITH BORDERLINE FINDINGS, HIGH RISK, BILATERAL: ICD-10-CM

## 2021-09-28 ENCOUNTER — TELEPHONE (OUTPATIENT)
Dept: SLEEP MEDICINE | Facility: OTHER | Age: 66
End: 2021-09-28

## 2021-09-28 DIAGNOSIS — G47.19 EXCESSIVE DAYTIME SLEEPINESS: Primary | ICD-10-CM

## 2021-10-01 ENCOUNTER — LAB VISIT (OUTPATIENT)
Dept: PRIMARY CARE CLINIC | Facility: CLINIC | Age: 66
End: 2021-10-01
Payer: COMMERCIAL

## 2021-10-01 DIAGNOSIS — G47.19 EXCESSIVE DAYTIME SLEEPINESS: ICD-10-CM

## 2021-10-01 DIAGNOSIS — G47.33 OSA (OBSTRUCTIVE SLEEP APNEA): ICD-10-CM

## 2021-10-01 PROCEDURE — U0005 INFEC AGEN DETEC AMPLI PROBE: HCPCS | Performed by: PSYCHIATRY & NEUROLOGY

## 2021-10-01 PROCEDURE — U0003 INFECTIOUS AGENT DETECTION BY NUCLEIC ACID (DNA OR RNA); SEVERE ACUTE RESPIRATORY SYNDROME CORONAVIRUS 2 (SARS-COV-2) (CORONAVIRUS DISEASE [COVID-19]), AMPLIFIED PROBE TECHNIQUE, MAKING USE OF HIGH THROUGHPUT TECHNOLOGIES AS DESCRIBED BY CMS-2020-01-R: HCPCS | Performed by: PSYCHIATRY & NEUROLOGY

## 2021-10-02 LAB
SARS-COV-2 RNA RESP QL NAA+PROBE: NOT DETECTED
SARS-COV-2- CYCLE NUMBER: NORMAL

## 2021-10-05 ENCOUNTER — TELEPHONE (OUTPATIENT)
Dept: SLEEP MEDICINE | Facility: OTHER | Age: 66
End: 2021-10-05

## 2021-10-05 ENCOUNTER — HOSPITAL ENCOUNTER (OUTPATIENT)
Dept: SLEEP MEDICINE | Facility: OTHER | Age: 66
Discharge: HOME | End: 2021-10-05
Attending: PSYCHIATRY & NEUROLOGY
Payer: COMMERCIAL

## 2021-10-05 DIAGNOSIS — G47.33 OSA (OBSTRUCTIVE SLEEP APNEA): ICD-10-CM

## 2021-10-05 PROCEDURE — 95811 PR POLYSOMNOGRAPHY W/CPAP: ICD-10-PCS | Mod: 26,,, | Performed by: PSYCHIATRY & NEUROLOGY

## 2021-10-05 PROCEDURE — 95811 POLYSOM 6/>YRS CPAP 4/> PARM: CPT

## 2021-10-05 PROCEDURE — 95811 POLYSOM 6/>YRS CPAP 4/> PARM: CPT | Mod: 26,,, | Performed by: PSYCHIATRY & NEUROLOGY

## 2021-10-18 ENCOUNTER — PATIENT MESSAGE (OUTPATIENT)
Dept: ADMINISTRATIVE | Facility: HOSPITAL | Age: 66
End: 2021-10-18
Payer: COMMERCIAL

## 2021-10-19 ENCOUNTER — TELEPHONE (OUTPATIENT)
Dept: NEUROLOGY | Facility: CLINIC | Age: 66
End: 2021-10-19

## 2021-10-26 ENCOUNTER — TELEPHONE (OUTPATIENT)
Dept: SLEEP MEDICINE | Facility: CLINIC | Age: 66
End: 2021-10-26
Payer: COMMERCIAL

## 2021-10-27 ENCOUNTER — TELEPHONE (OUTPATIENT)
Dept: SLEEP MEDICINE | Facility: CLINIC | Age: 66
End: 2021-10-27
Payer: COMMERCIAL

## 2021-11-02 ENCOUNTER — TELEPHONE (OUTPATIENT)
Dept: NEUROLOGY | Facility: CLINIC | Age: 66
End: 2021-11-02

## 2021-11-12 ENCOUNTER — TELEPHONE (OUTPATIENT)
Dept: SLEEP MEDICINE | Facility: CLINIC | Age: 66
End: 2021-11-12
Payer: COMMERCIAL

## 2021-12-06 ENCOUNTER — PATIENT MESSAGE (OUTPATIENT)
Dept: SLEEP MEDICINE | Facility: CLINIC | Age: 66
End: 2021-12-06
Payer: COMMERCIAL

## 2021-12-07 ENCOUNTER — TELEPHONE (OUTPATIENT)
Dept: SLEEP MEDICINE | Facility: CLINIC | Age: 66
End: 2021-12-07
Payer: COMMERCIAL

## 2022-01-21 ENCOUNTER — TELEPHONE (OUTPATIENT)
Dept: NEUROLOGY | Facility: CLINIC | Age: 67
End: 2022-01-21

## 2022-01-21 NOTE — TELEPHONE ENCOUNTER
Called patient and I attempted to get his chart ready prior to his virtual video visit, but he is currently in the state [facility] and wont be back until July  I rescheduled his appointment to 07/26/22 at 09:45am in the  with Miky Nova

## 2022-02-02 DIAGNOSIS — E11.9 TYPE 2 DIABETES MELLITUS WITHOUT COMPLICATION: ICD-10-CM

## 2022-02-23 ENCOUNTER — PATIENT MESSAGE (OUTPATIENT)
Dept: SLEEP MEDICINE | Facility: CLINIC | Age: 67
End: 2022-02-23
Payer: COMMERCIAL

## 2022-03-02 DIAGNOSIS — E11.8 TYPE 2 DIABETES MELLITUS WITH COMPLICATION, WITHOUT LONG-TERM CURRENT USE OF INSULIN: ICD-10-CM

## 2022-03-02 DIAGNOSIS — G43.909 MIGRAINE WITHOUT STATUS MIGRAINOSUS, NOT INTRACTABLE, UNSPECIFIED MIGRAINE TYPE: ICD-10-CM

## 2022-03-03 RX ORDER — NADOLOL 20 MG/1
TABLET ORAL
Qty: 90 TABLET | Refills: 3 | Status: SHIPPED | OUTPATIENT
Start: 2022-03-03

## 2022-03-03 RX ORDER — METFORMIN HYDROCHLORIDE 500 MG/1
TABLET ORAL
Qty: 180 TABLET | Refills: 3 | Status: SHIPPED | OUTPATIENT
Start: 2022-03-03 | End: 2023-02-27

## 2022-03-04 DIAGNOSIS — K21.9 GASTROESOPHAGEAL REFLUX DISEASE: ICD-10-CM

## 2022-03-04 NOTE — TELEPHONE ENCOUNTER
Care Due:                  Date            Visit Type   Department     Provider  --------------------------------------------------------------------------------                                EP -                              PRIMARY      [facility]  Last Visit: 08-      CARE ([facility])   RACHID Amezquita                              EP -                              PRIMARY      [facility]  Next Visit: 03-      CARE ([facility])   RACHID Amezquita                                                            Last  Test          Frequency    Reason                     Performed    Due Date  --------------------------------------------------------------------------------    HBA1C.......  6 months...  metFORMIN................  08- 02-    Powered by [employer]. Reference number: [id number].   3/04/2022 9:38:27 AM CST

## 2022-03-11 RX ORDER — OMEPRAZOLE 40 MG/1
40 CAPSULE, DELAYED RELEASE ORAL EVERY MORNING
Qty: 90 CAPSULE | Refills: 1 | Status: SHIPPED | OUTPATIENT
Start: 2022-03-11 | End: 2022-08-31

## 2022-03-11 NOTE — TELEPHONE ENCOUNTER
Provider Staff:     Action is required for this patient.   Please see care gap opportunities below in Care Due Message.     Thanks!  [facility]     Appointments      Date Provider   Last Visit   8/18/2021 Simone Amezquita MD   Next Visit   3/30/2022 Simone Amezquita MD     Note composed:5:52 PM 03/11/2022

## 2022-03-11 NOTE — TELEPHONE ENCOUNTER
Refill Authorization Note   Clive Guzman  is requesting a refill authorization.  Brief Assessment and Rationale for Refill:  Approve    -Medication-Related Problems Identified: Requires labs  Medication Therapy Plan:  Labs needed (a1c)    Medication Reconciliation Completed: No   Comments:   --->Care Gap information included below if applicable.   Orders Placed This Encounter    omeprazole (PRILOSEC) 40 MG capsule      Requested Prescriptions   Signed Prescriptions Disp Refills    omeprazole (PRILOSEC) 40 MG capsule 90 capsule 1     Sig: Take 1 capsule (40 mg total) by mouth every morning.       Gastroenterology: Proton Pump Inhibitors Passed - 3/4/2022  9:38 AM        Passed - Patient is at least 18 years old        Passed - Osteoporosis is not on problem list        Passed - Plavix is not on active medication list        Passed - An appropriate indication is on the problem list        Passed - Valid encounter within last 15 months     Recent Visits  Date Type Provider Dept   08/18/21 Office Visit Simone Amezquita MD [facility]   01/27/21 Office Visit Simone Amezquita MD [facility]   Showing recent visits within past 720 days and meeting all other requirements  Future Appointments  No visits were found meeting these conditions.  Showing future appointments within next 150 days and meeting all other requirements      Future Appointments              In 2 weeks Simone Amezquita MD [facility] [place]                    Appointments  past 12m or future 3m with PCP    Date Provider   Last Visit   8/18/2021 Simone Amezquita MD   Next Visit   3/30/2022 Simone Amezquita MD   ED visits in past 90 days: 0     Note composed:5:52 PM 03/11/2022

## 2022-03-18 ENCOUNTER — PATIENT MESSAGE (OUTPATIENT)
Dept: OTOLARYNGOLOGY | Facility: CLINIC | Age: 67
End: 2022-03-18
Payer: COMMERCIAL

## 2022-03-30 DIAGNOSIS — E11.9 TYPE 2 DIABETES MELLITUS WITHOUT COMPLICATION: ICD-10-CM

## 2022-04-04 ENCOUNTER — PATIENT MESSAGE (OUTPATIENT)
Dept: ADMINISTRATIVE | Facility: HOSPITAL | Age: 67
End: 2022-04-04
Payer: COMMERCIAL

## 2022-05-10 LAB
CHOLEST SERPL-MSCNC: 194 MG/DL (ref 0–200)
HBA1C MFR BLD: 6.2 % (ref 4–6)
HDLC SERPL-MCNC: 48 MG/DL (ref 35–70)
LDLC SERPL CALC-MCNC: 127 MG/DL (ref 0–160)
TRIGL SERPL-MCNC: 90 MG/DL (ref 40–160)

## 2022-05-21 ENCOUNTER — PATIENT MESSAGE (OUTPATIENT)
Dept: ADMINISTRATIVE | Facility: HOSPITAL | Age: 67
End: 2022-05-21
Payer: COMMERCIAL

## 2022-05-31 ENCOUNTER — PATIENT MESSAGE (OUTPATIENT)
Dept: ADMINISTRATIVE | Facility: HOSPITAL | Age: 67
End: 2022-05-31
Payer: COMMERCIAL

## 2022-07-26 ENCOUNTER — TELEPHONE (OUTPATIENT)
Dept: NEUROLOGY | Facility: CLINIC | Age: 67
End: 2022-07-26

## 2022-07-26 DIAGNOSIS — G43.009 MIGRAINE WITHOUT AURA AND WITHOUT STATUS MIGRAINOSUS, NOT INTRACTABLE: ICD-10-CM

## 2022-07-26 NOTE — TELEPHONE ENCOUNTER
Called patient and I tried to get his chart ready for his virtual video visit, but he notified me that he is currently in [place] and was unable to travel due to surgery  I rescheduled his appointment to 09/26/22       Adarsh Salas,   Patient is asking if you can send in a refill for his Emgality to [facility] , [facility]

## 2022-09-04 DIAGNOSIS — G43.009 MIGRAINE WITHOUT AURA AND WITHOUT STATUS MIGRAINOSUS, NOT INTRACTABLE: ICD-10-CM

## 2022-09-06 RX ORDER — ZOLMITRIPTAN 5 MG/1
SPRAY, METERED NASAL
Qty: 6 EACH | Refills: 3 | Status: SHIPPED | OUTPATIENT
Start: 2022-09-06

## 2022-09-06 RX ORDER — ZOLMITRIPTAN 5 MG/1
TABLET, FILM COATED ORAL
Qty: 6 TABLET | Refills: 6 | Status: SHIPPED | OUTPATIENT
Start: 2022-09-06

## 2022-09-23 DIAGNOSIS — G47.33 OSA (OBSTRUCTIVE SLEEP APNEA): Primary | ICD-10-CM

## 2022-09-26 ENCOUNTER — TELEPHONE (OUTPATIENT)
Dept: SLEEP MEDICINE | Facility: CLINIC | Age: 67
End: 2022-09-26
Payer: COMMERCIAL

## 2022-09-26 ENCOUNTER — TELEMEDICINE (OUTPATIENT)
Dept: NEUROLOGY | Facility: CLINIC | Age: 67
End: 2022-09-26
Payer: COMMERCIAL

## 2022-09-26 DIAGNOSIS — G43.009 MIGRAINE WITHOUT AURA AND WITHOUT STATUS MIGRAINOSUS, NOT INTRACTABLE: ICD-10-CM

## 2022-09-26 DIAGNOSIS — G43.709 CHRONIC MIGRAINE WITHOUT AURA WITHOUT STATUS MIGRAINOSUS, NOT INTRACTABLE: Primary | ICD-10-CM

## 2022-09-26 PROCEDURE — 99215 OFFICE O/P EST HI 40 MIN: CPT | Performed by: PHYSICIAN ASSISTANT

## 2022-09-26 RX ORDER — ATOGEPANT 60 MG/1
60 TABLET ORAL DAILY
Qty: 30 TABLET | Refills: 11 | Status: SHIPPED | OUTPATIENT
Start: 2022-09-26
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
## 2022-09-26 NOTE — TELEPHONE ENCOUNTER
The patient has just recently moved to [place]. He will try to get him a BIPAP machine there. I send him the orders Dr. Vann put in.
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
## 2022-09-26 NOTE — PATIENT INSTRUCTIONS
Headache/migraine treatment:   - When you have a moderate to severe headache, you should seek rest, initiate relaxation and apply cold compresses to the head  Abortive medications (for immediate treatment of a headache): It is ok to take ibuprofen, acetaminophen or naproxen (Advil, Tylenol,  Aleve, Excedrin) if they help your headaches you should limit these to No more than 3 times a week to avoid medication overuse/rebound headaches  For your more moderate to severe migraines take this medication early   Zolmitriptan 5mg tabs - take one at the onset of headache  May repeat one time after 1-2 hours if pain has not resolved  (Max 2 a day and 9 a month) May use spray if warranted instead of tablet     Over the counter preventive supplements for headaches/migraines   (to take every day to help prevent headaches - not to take at the time of headache):  [x] Magnesium 400mg daily (If any diarrhea or upset stomach, decrease dose  as tolerated)     Prescription preventive medications for headaches/migraines   (to take every day to help prevent headaches - not to take at the time of headache):  [x] HOLD the Emgality 1 injection every 30 day  [x] Start Quilpta 60 mg daily as preventative  Please let me know if you cannot obtain the medication OR if you are worsening and we can try a different injectable or oral preventative    *Typically these types of medications take time untill you see the benefit, although some may see improvement in days, often it may take weeks, especially if the medication is being titrated up to a beneficial level  Please contact us if there are any concerns or questions regarding the medication  Lifestyle Recommendations:  [x] SLEEP - Maintain a regular sleep schedule: Adults need at least 7-8 hours of uninterrupted a night   Maintain good sleep hygiene:  Going to bed and waking up at consistent times, avoiding excessive daytime naps, avoiding caffeinated beverages in the evening, avoid excessive stimulation in the evening and generally using bed primarily for sleeping  One hour before bedtime would recommend turning lights down lower, decreasing your activity (may read quietly, listen to music at a low volume)  When you get into bed, should eliminate all technology (no texting, emailing, playing with your phone, iPad or tablet in bed)  [x] HYDRATION - Maintain good hydration  Drink  2L of fluid a day (4 typical small water bottles)  [x] DIET - Maintain good nutrition  In particular don't skip meals and try and eat healthy balanced meals regularly  [x] TRIGGERS - Look for other triggers and avoid them: Limit caffeine to 1-2 cups a day or less  Avoid dietary triggers that you have noticed bring on your headaches (this could include aged cheese, peanuts, MSG, aspartame and nitrates)  [x] EXERCISE - physical exercise as we all know is good for you in many ways, and not only is good for your heart, but also is beneficial for your mental health, cognitive health and  chronic pain/headaches  I would encourage at the least 5 days of physical exercise weekly for at least 30 minutes  Education and Follow-up  [x] Please call with any questions or concerns  Of course if any new concerning symptoms go to the emergency department    [x] Follow up in 1 year

## 2022-09-26 NOTE — TELEPHONE ENCOUNTER
----- Message from Dorothy Vann MD sent at 2022  4:07 PM CDT -----  Regarding: RE: DME call back  Randall Floyd,    Did you mean that he needed the prescription for BPAP machine or BPAP supplies - could you clarify?  I see that BPAP was ordered for him about a year ago - has he received it then? If yes, he is due for BPAP follow up visit - please offer to schedule.    I have reordered his supplies for him, just in case.      Thank you!  ----- Message -----  From: Mariel Fong MA  Sent: 2022   1:10 PM CDT  To: Dorothy Vann MD  Subject: FW: DME call back                                 Patient just needs a new script for his BIPAP his last one /   ----- Message -----  From: Denice Flores  Sent: 2022  10:33 AM CDT  To: Edwar De La Cruz Staff  Subject: DME call back                                    [facility]     What is the request in detail: Patient is requesting a call back. She states pt is only just now trying to fill his bi pap script, but the script has . She would like a new one sent to fax. [phone]. She states pt demographics and insurance ID need to be included with script.   Please advise.    Can the clinic reply by [facility]? No    Best call back number: [phone]    Additional Information: N/A

## 2022-09-27 ENCOUNTER — TELEPHONE (OUTPATIENT)
Dept: NEUROLOGY | Facility: CLINIC | Age: 67
End: 2022-09-27

## 2022-09-29 ENCOUNTER — PATIENT OUTREACH (OUTPATIENT)
Dept: ADMINISTRATIVE | Facility: HOSPITAL | Age: 67
End: 2022-09-29
Payer: COMMERCIAL

## 2022-09-29 NOTE — PROGRESS NOTES
Uncontrolled A1C >8 [facility] REPORT         [facility] reviewed    Updated HM with lab report  A1C , Lipid      Hemoglobin A1C   Date Value Ref Range Status   08/20/2021 6.0 (H) 4.0 - 5.6 % Final     Comment:     ADA Screening Guidelines:  5.7-6.4%  Consistent with prediabetes  >or=6.5%  Consistent with diabetes    High levels of fetal hemoglobin interfere with the HbA1C  assay. Heterozygous hemoglobin variants (HbS, HgC, etc)do  not significantly interfere with this assay.   However, presence of multiple variants may affect accuracy.     01/27/2021 6.0 (H) 4.0 - 5.6 % Final     Comment:     ADA Screening Guidelines:  5.7-6.4%  Consistent with prediabetes  >or=6.5%  Consistent with diabetes    High levels of fetal hemoglobin interfere with the HbA1C  assay. Heterozygous hemoglobin variants (HbS, HgC, etc)do  not significantly interfere with this assay.   However, presence of multiple variants may affect accuracy.     11/09/2019 5.9 (H) 4.0 - 5.6 % Final     Comment:     ADA Screening Guidelines:  5.7-6.4%  Consistent with prediabetes  >or=6.5%  Consistent with diabetes  High levels of fetal hemoglobin interfere with the HbA1C  assay. Heterozygous hemoglobin variants (HbS, HgC, etc)do  not significantly interfere with this assay.   However, presence of multiple variants may affect accuracy.

## 2022-09-30 DIAGNOSIS — G43.009 MIGRAINE WITHOUT AURA AND WITHOUT STATUS MIGRAINOSUS, NOT INTRACTABLE: Primary | ICD-10-CM

## 2022-09-30 RX ORDER — RIMEGEPANT SULFATE 75 MG/75MG
75 TABLET, ORALLY DISINTEGRATING ORAL EVERY OTHER DAY
Qty: 16 TABLET | Refills: 11 | Status: SHIPPED | OUTPATIENT
Start: 2022-09-30

## 2022-09-30 NOTE — TELEPHONE ENCOUNTER
[facility] [place] initiated on CMM  (Key: [id number])  CaseId:[id number];Status:Approved; Review Type:Prior Auth; Coverage Start Date:09/30/2022; Coverage End Date:09/30/2023;     Called [phone] and left a detailed message (ok per communication consent) on pt's answering machine of the below and above  Cb if any questions or concerns

## 2022-11-30 DIAGNOSIS — G43.009 MIGRAINE WITHOUT AURA AND WITHOUT STATUS MIGRAINOSUS, NOT INTRACTABLE: ICD-10-CM

## 2022-11-30 RX ORDER — RIMEGEPANT SULFATE 75 MG/75MG
75 TABLET, ORALLY DISINTEGRATING ORAL EVERY OTHER DAY
Qty: 16 TABLET | Refills: 0 | Status: CANCELLED | OUTPATIENT
Start: 2022-11-30

## 2023-03-08 ENCOUNTER — PATIENT MESSAGE (OUTPATIENT)
Dept: NEUROLOGY | Facility: CLINIC | Age: 68
End: 2023-03-08

## 2023-03-21 ENCOUNTER — TELEPHONE (OUTPATIENT)
Dept: NEUROLOGY | Facility: CLINIC | Age: 68
End: 2023-03-21

## 2023-03-21 NOTE — TELEPHONE ENCOUNTER
The Social Work Team received a fax in the [facility] that stated that [employer] was unable to process application due to the following missing information:  - household income  - proof of income  - Medicare Part D info and card copy  - Medicare provider address

## 2023-03-21 NOTE — TELEPHONE ENCOUNTER
MSW phoned pt and lvm requesting a call back  Pt informed that he submitted all patient portion of appt along with copies of insurance cards and IDs via mail  MSW informed pt that she will call aydin tomorrow to ensure that they have all the paperwork and will assist through out the process  pt informed that he has a [facility] card no longer [employer] as he recently retired due to going through prostate cancer  Pt has a permanent address in [place] but has a second residence in [place]  Pt informed that his main address is [place] and medication will have to be shipped to [facility]   MSW will continue to follow

## 2023-03-22 NOTE — TELEPHONE ENCOUNTER
MSW phoned [employer] at [phone] and spoke with Kaitlin Botello who informed that juni is being reviewed and they will have an answer in 2-3 business      MSW phoned pt and informed that   Juni is being reviewed and they will have an answer in 2-3 business days

## 2023-03-27 NOTE — TELEPHONE ENCOUNTER
MSW phoned [employer] at [phone] and spoke with May Class who informed that patricia is being reviewed and they will have an answer in 2-3 business       Order Supervisor override, before order is placed  [place] Class sent second request

## 2023-03-30 NOTE — TELEPHONE ENCOUNTER
MSW phoned [employer] at [phone] and spoke with [place] who informed that Olu Rosenbaumsera was approved on 3/27/2023   To arrive Saturday April 1st to patient's home  Pt will get a 3m supply and will be able to request refill by calling [facility]  Next June 5th has to call to request new refill     MSW lvm to pt informing same

## 2023-05-26 DIAGNOSIS — E11.8 TYPE 2 DIABETES MELLITUS WITH COMPLICATION, WITHOUT LONG-TERM CURRENT USE OF INSULIN: ICD-10-CM

## 2023-05-26 RX ORDER — METFORMIN HYDROCHLORIDE 500 MG/1
TABLET ORAL
Qty: 180 TABLET | Refills: 0 | OUTPATIENT
Start: 2023-05-26

## 2023-05-26 NOTE — TELEPHONE ENCOUNTER
Care Due:                  Date            Visit Type   Department     Provider  --------------------------------------------------------------------------------                                EP -                              PRIMARY      [facility]  Last Visit: 08-      CARE ([facility])   MEDICINE       Simone Amezquita  Next Visit: None Scheduled  None         None Found                                                            Last  Test          Frequency    Reason                     Performed    Due Date  --------------------------------------------------------------------------------    Office Visit  12 months..  metFORMIN, omeprazole....  08- 08-    Cr..........  12 months..  metFORMIN................  08-   08-    HBA1C.......  6 months...  metFORMIN................  05-   11-    [facility] Embedded Care Due Messages. Reference number: [id number].   5/26/2023 5:51:44 AM CDT

## 2023-09-23 ENCOUNTER — TELEPHONE (OUTPATIENT)
Dept: NEUROLOGY | Facility: CLINIC | Age: 68
End: 2023-09-23

## 2023-09-23 NOTE — TELEPHONE ENCOUNTER
[facility]   [id number]    ID [id number]    Per enc 22-PA will  23    PA initiated on CM.      Awaiting determination

## 2023-09-28 NOTE — TELEPHONE ENCOUNTER
Per [facility] September 23  Approved. This drug has been approved under the Member's Medicare Part D benefit. Approved quantity: 16 units per 30 day(s).

## 2023-09-30 DIAGNOSIS — G43.009 MIGRAINE WITHOUT AURA AND WITHOUT STATUS MIGRAINOSUS, NOT INTRACTABLE: ICD-10-CM

## 2023-10-02 RX ORDER — RIMEGEPANT SULFATE 75 MG/75MG
TABLET, ORALLY DISINTEGRATING ORAL
Qty: 16 TABLET | Refills: 3 | Status: SHIPPED | OUTPATIENT
Start: 2023-10-02

## 2023-10-04 NOTE — TELEPHONE ENCOUNTER
Hello Good Day,    Called pt no answer, LMOM to call us back and schedule a follow up appointment in order to get any further refills.     Thank you,     [facility]

## 2023-10-13 NOTE — TELEPHONE ENCOUNTER
Pt is now scheduled for 2/7 @ 1:45 with Tarik Wang. Last 2 appts have been virtual but pt is asking if appt can be virtual again. Please let me know. Thank you.

## 2023-11-03 ENCOUNTER — TELEPHONE (OUTPATIENT)
Dept: NEUROLOGY | Facility: CLINIC | Age: 68
End: 2023-11-03

## 2023-11-03 NOTE — TELEPHONE ENCOUNTER
received vm from 11/2 at 10:17am-To hi, this is Shakila Huff. i'm a patient of Dr. Kiran Liz. my YOB: 1955. And I was requesting financial assistance for purchasing qulipta from "[employer] the current . And they said they had sent paperwork over to be filled out by dr Kiran Liz and sent back in. And I was wondering what the status of that was, if there's anything I can do to help it along. If you have please contact me [phone]. Thank you.  ---------------------------------------------------------  I do not see that we received any faxes from [facility].     Can you assist with patient assistance

## 2023-11-03 NOTE — TELEPHONE ENCOUNTER
MSW received a callback from patient ([phone]). He stated that he already submitted his section (confirmation # [id number]), and that he received notice that our office did not submit the provider section. MSW explained that our office did not receive same, but that this writer was able to get the Qulipta PAP application from the "[employer]. MSW advised that this writer will initiate same and will have PA-C complete and sign same. MSW advised that this writer will then submit the PAP to Sohan Castañeda for processing. MSW will update patient as able.

## 2023-11-03 NOTE — TELEPHONE ENCOUNTER
MSW attempted to reach patient at [phone] regarding his Qulipta PAP application (likely re-enrollment for 2024). No answer. MSW left a message requesting callback. MSW unable to locate any paperwork sent from [employer], but this writer was able to locate provider section of Qulipta PAP application online and will initiate same.

## 2023-11-07 NOTE — TELEPHONE ENCOUNTER
MSW received completed provider section back from LILIANA. MSW faxed provider section and med/allergy list to Sohan Castañeda at [phone] (had to send to priority fax line as [phone] fax was busy). Successful fax notice received. MSW will follow-up with [employer] in about 2 days to confirm receipt of fax and check on application processing times. MSW will update patient as able. MSW phoned patient to make him aware of above at [phone].

## 2023-11-09 NOTE — TELEPHONE ENCOUNTER
MSW phoned [employer] at [phone] and spoke with Rochelle. She confirmed that they received the provider section of the application and advised that application processing is taking 5-7 business days. Rochelle stated that patient will receive a call/letter with determination and our office will receive a fax. MSW phoned patient at [phone] to make him aware of above. Patient stated that he did receive a text message last night from [facility] stating that they needed more information from him such as a copy of his insurance card and his financials. Patient stated that he plans to call them to advise that nothing has changed since last year in hopes they can use the info submitted previously for the 5189 application. MSW will await determination.

## 2023-11-20 NOTE — TELEPHONE ENCOUNTER
MSW phoned [employer] at [phone] and spoke with [facility]. She stated that patient is approved from 1/1/24-12/31/24. [facility] stated that patient has opted for calls/texts/emails, so he will be receiving notification of approval shortly. MSW attempted to reach patient at [phone] to make him aware of approval. No answer. MSW left a detailed message with above information. MSW advised that patient will need to reapply if he needs continued assistance in 2025, and to reach out to our office if he requires assistance. The Social Work Team will be available to patient as needed.

## 2023-12-27 DIAGNOSIS — G43.009 MIGRAINE WITHOUT AURA AND WITHOUT STATUS MIGRAINOSUS, NOT INTRACTABLE: ICD-10-CM

## 2023-12-27 RX ORDER — ZOLMITRIPTAN 5 MG/1
TABLET, FILM COATED ORAL
Qty: 6 TABLET | Refills: 6 | Status: SHIPPED | OUTPATIENT
Start: 2023-12-27

## 2024-01-22 ENCOUNTER — PATIENT MESSAGE (OUTPATIENT)
Dept: FAMILY MEDICINE | Facility: CLINIC | Age: 69
End: 2024-01-22
Payer: COMMERCIAL

## 2024-02-07 ENCOUNTER — TELEMEDICINE (OUTPATIENT)
Dept: NEUROLOGY | Facility: CLINIC | Age: 69
End: 2024-02-07
Payer: MEDICARE

## 2024-02-07 DIAGNOSIS — G43.009 MIGRAINE WITHOUT AURA AND WITHOUT STATUS MIGRAINOSUS, NOT INTRACTABLE: Primary | ICD-10-CM

## 2024-02-07 DIAGNOSIS — G43.709 CHRONIC MIGRAINE WITHOUT AURA WITHOUT STATUS MIGRAINOSUS, NOT INTRACTABLE: ICD-10-CM

## 2024-02-07 PROCEDURE — 99214 OFFICE O/P EST MOD 30 MIN: CPT | Performed by: PHYSICIAN ASSISTANT

## 2024-02-07 RX ORDER — ABIRATERONE ACETATE 250 MG/1
1 TABLET ORAL DAILY
COMMUNITY
Start: 2024-01-20

## 2024-02-07 RX ORDER — TADALAFIL 5 MG/1
5 TABLET ORAL DAILY
COMMUNITY

## 2024-02-07 RX ORDER — OXYBUTYNIN CHLORIDE 5 MG/1
5 TABLET ORAL DAILY
COMMUNITY

## 2024-02-07 RX ORDER — GABAPENTIN 100 MG/1
100 CAPSULE ORAL 3 TIMES DAILY
COMMUNITY

## 2024-02-07 RX ORDER — PREDNISONE 5 MG/1
5 TABLET ORAL 2 TIMES DAILY
COMMUNITY
Start: 2023-06-05
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
## 2024-09-13 ENCOUNTER — OFFICE VISIT (OUTPATIENT)
Dept: URGENT CARE | Age: 69
End: 2024-09-13
Payer: MEDICARE

## 2024-09-13 VITALS
RESPIRATION RATE: 18 BRPM | HEIGHT: 68 IN | SYSTOLIC BLOOD PRESSURE: 136 MMHG | HEART RATE: 81 BPM | WEIGHT: 211.6 LBS | OXYGEN SATURATION: 98 % | BODY MASS INDEX: 32.07 KG/M2 | TEMPERATURE: 98.2 F | DIASTOLIC BLOOD PRESSURE: 65 MMHG

## 2024-09-13 DIAGNOSIS — S39.012A STRAIN OF LUMBAR REGION, INITIAL ENCOUNTER: Primary | ICD-10-CM

## 2024-09-13 PROCEDURE — G0463 HOSPITAL OUTPT CLINIC VISIT: HCPCS | Performed by: EMERGENCY MEDICINE

## 2024-09-13 PROCEDURE — 99213 OFFICE O/P EST LOW 20 MIN: CPT | Performed by: EMERGENCY MEDICINE

## 2024-09-13 RX ORDER — ACETAMINOPHEN 500 MG
1000 TABLET ORAL EVERY 6 HOURS PRN
Qty: 120 TABLET | Refills: 1 | Status: SHIPPED | OUTPATIENT
Start: 2024-09-13 | End: 2024-10-13

## 2024-09-13 RX ORDER — DICYCLOMINE HYDROCHLORIDE 10 MG/1
10 CAPSULE ORAL
COMMUNITY
Start: 2024-09-09

## 2024-09-13 RX ORDER — METHOCARBAMOL 500 MG/1
500 TABLET, FILM COATED ORAL 3 TIMES DAILY PRN
Qty: 21 TABLET | Refills: 0 | Status: SHIPPED | OUTPATIENT
Start: 2024-09-13 | End: 2024-09-20

## 2024-09-13 RX ORDER — NITROFURANTOIN MACROCRYSTALS 50 MG/1
50 CAPSULE ORAL DAILY
COMMUNITY
Start: 2024-08-27

## 2024-09-13 NOTE — PROGRESS NOTES
"  [facility]        NAME: Ramesh Cline is a 69 y.o. male  : 1955    MRN: [id number]  DATE: 2024  TIME: 9:56 AM    Assessment and Plan   Strain of lumbar region, initial encounter [S39.012A]  1. Strain of lumbar region, initial encounter  methocarbamol (ROBAXIN) 500 mg tablet    acetaminophen (TYLENOL) 500 mg tablet        No red flag signs or symptoms identified on exam, no abdominal bruits distention or tenderness noted.  Patient otherwise neurologically intact.  Advised patient's to utilize Robaxin judiciously given possible sedation.  Advised patient to not drive or operate heavy machinery while taking this medication.  Advised patient if he develops persistent or worsening symptoms to seek care in ED, otherwise follow-up closely with his PCP as directed.  All questions answered at bedside who patient was amenable to plan and voiced understanding.    Patient Instructions       Follow up with PCP in 3-5 days.  Proceed to  ER if symptoms worsen.    If tests have been performed at [facility], our office will contact you with results if changes need to be made to the care plan discussed with you at the visit.  You can review your full results on [facility] MyChart.    Chief Complaint     Chief Complaint   Patient presents with    Back Pain     Patient reports about 3 days ago he lifted an item that was around 50lbs and turned, pulled a muscle in his lower back, having muscle spasms.          History of Present Illness       69-year-old male with history of prostate cancer status post TURP and scar tissue formation with indwelling suprapubic catheter, migraine headaches, DM 2, hyperlipidemia,  presents with chief complaint of left-sided low back pain after picking up an approximately 50 pound motor 2 days ago and \"twisting\".  Patient states that his back \"locked up\" at time of injury and has been gradually improving since then.  He states he still has difficulty twisting and changing " position secondary to low back pain.  He denies any fevers, unintended weight loss, night sweats, prior IV drug use, additionally denies any bowel or bladder symptoms, suprapubic catheter was last changed on 9/5/2024 and he denies any drainage or bleeding from his ostomy site.  Denies hematuria.  Additionally denies any leg weakness or numbness or saddle anesthesia.  He denies abdominal chest pain or shortness of breath.    Back Pain  This is a new problem. The current episode started in the past 7 days. The problem occurs constantly. The problem has been waxing and waning since onset. The pain is present in the lumbar spine. The pain does not radiate. The pain is at a severity of 2/10. The pain is mild. The pain is The same all the time. The symptoms are aggravated by bending, lying down, sitting, twisting and standing. Pertinent negatives include no abdominal pain, bladder incontinence, bowel incontinence, chest pain, dysuria, fever, headaches, leg pain, numbness, paresis, paresthesias, pelvic pain, perianal numbness, tingling, weakness or weight loss.       Review of Systems   Review of Systems   Constitutional:  Negative for activity change, appetite change, chills, diaphoresis, fatigue, fever, unexpected weight change and weight loss.   HENT:  Negative for ear pain and sore throat.    Eyes:  Negative for pain and visual disturbance.   Respiratory:  Negative for cough and shortness of breath.    Cardiovascular:  Negative for chest pain, palpitations and leg swelling.   Gastrointestinal:  Negative for abdominal distention, abdominal pain, bowel incontinence and vomiting.   Genitourinary:  Negative for bladder incontinence, dysuria, hematuria and pelvic pain.   Musculoskeletal:  Positive for back pain and gait problem. Negative for arthralgias, joint swelling, myalgias, neck pain and neck stiffness.   Skin:  Negative for color change and rash.   Neurological:  Negative for dizziness, tingling, tremors, seizures,  syncope, facial asymmetry, speech difficulty, weakness, light-headedness, numbness, headaches and paresthesias.   All other systems reviewed and are negative.        Current Medications       Current Outpatient Medications:     acetaminophen (TYLENOL) 500 mg tablet, Take 2 tablets (1,000 mg total) by mouth every 6 (six) hours as needed for mild pain or moderate pain, Disp: 120 tablet, Rfl: 1    dicyclomine (BENTYL) 10 mg capsule, Take 10 mg by mouth 3 (three) times a day before meals, Disp: , Rfl:     methocarbamol (ROBAXIN) 500 mg tablet, Take 1 tablet (500 mg total) by mouth 3 (three) times a day as needed for muscle spasms for up to 7 days, Disp: 21 tablet, Rfl: 0    nitrofurantoin (MACRODANTIN) 50 mg capsule, Take 50 mg by mouth daily, Disp: , Rfl:     abiraterone (ZYTIGA) 250 mg tablet, Take 1 tablet by mouth daily, Disp: , Rfl:     Atogepant (Qulipta) 60 MG TABS, Take 60 mg by mouth in the morning, Disp: 30 tablet, Rfl: 11    Azelastine HCl 137 MCG/SPRAY SOLN, 137 mcg into each nostril daily at bedtime as needed , Disp: , Rfl:     Cholecalciferol (Vitamin D3) 50 MCG (2000 UT) TABS, Take 2,000 Units by mouth daily , Disp: , Rfl:     fenofibrate (TRIGLIDE) 160 MG tablet, Take 160 mg by mouth daily, Disp: , Rfl:     fluticasone (VERAMYST) 27.5 MCG/SPRAY nasal spray, 2 sprays into each nostril as needed , Disp: , Rfl:     gabapentin (NEURONTIN) 100 mg capsule, Take 100 mg by mouth 3 (three) times a day, Disp: , Rfl:     Magnesium Hydroxide (MAGNESIA PO), Take 400 mg by mouth daily , Disp: , Rfl:     metFORMIN (GLUCOPHAGE) 500 mg tablet, Take 500 mg by mouth 2 (two) times a day with meals, Disp: , Rfl:     Nurtec 75 MG TBDP, DISSOLVE 1 TABLET ON THE TONGUE EVERY OTHER DAY (Patient not taking: Reported on 2/7/2024), Disp: 16 tablet, Rfl: 3    omeprazole (PriLOSEC) 40 MG capsule, Take 40 mg by mouth daily, Disp: , Rfl:     oxybutynin (DITROPAN) 5 mg tablet, Take 5 mg by mouth in the morning, Disp: , Rfl:      "predniSONE 5 mg tablet, Take 5 mg by mouth 2 (two) times a day, Disp: , Rfl:     tadalafil (CIALIS) 5 MG tablet, Take 5 mg by mouth daily, Disp: , Rfl:     ZOLMitriptan (ZOMIG) 5 MG tablet, TAKE 1 TABLET(5 MG) BY MOUTH 1 TIME AS NEEDED FOR MIGRAINE, Disp: 6 tablet, Rfl: 6    Zomig 5 MG nasal solution, 1 SPRAY TO EACH NOSTRIL AS NEEDED FOR MIGRAIN, Disp: 6 each, Rfl: 3    Current Allergies     Allergies as of 09/13/2024 - Reviewed 09/13/2024   Allergen Reaction Noted    Atorvastatin  02/05/2010            The following portions of the patient's history were reviewed and updated as appropriate: allergies, current medications, past family history, past medical history, past social history, past surgical history and problem list.     Past Medical History:   Diagnosis Date    Bell's palsy     Diabetes (HCC)     Diabetes mellitus (HCC) 2018    Controlled with Medformin    High cholesterol     Migraine     Prostate cancer (HCC)     Sleep apnea        Past Surgical History:   Procedure Laterality Date    EAR SURGERY Left        Family History   Problem Relation Age of Onset    Pneumonia Mother     Kidney cancer Mother     Dementia Mother     Stroke Father     Heart disease Father     Leukemia Brother     Diabetes Paternal Grandmother     Dementia Paternal Grandmother     Diabetes Paternal Grandfather     Stroke Paternal Grandfather          Medications have been verified.        Objective   /65   Pulse 81   Temp 98.2 °F (36.8 °C) (Tympanic)   Resp 18   Ht 5' 8\" (1.727 m)   Wt 96 kg (211 lb 9.6 oz)   SpO2 98%   BMI 32.17 kg/m²   No LMP for male patient.       Physical Exam     Physical Exam  Vitals and nursing note reviewed.   Constitutional:       General: He is not in acute distress.     Appearance: Normal appearance. He is normal weight. He is not ill-appearing, toxic-appearing or diaphoretic.   HENT:      Head: Normocephalic and atraumatic.      Right Ear: External ear normal.      Left Ear: External ear " normal.      Nose: Nose normal.   Eyes:      Extraocular Movements: Extraocular movements intact.      Conjunctiva/sclera: Conjunctivae normal.      Pupils: Pupils are equal, round, and reactive to light.   Cardiovascular:      Rate and Rhythm: Normal rate and regular rhythm.      Pulses: Normal pulses.      Heart sounds: Normal heart sounds.   Pulmonary:      Effort: Pulmonary effort is normal. No respiratory distress.      Breath sounds: Normal breath sounds. No stridor. No wheezing, rhonchi or rales.   Chest:      Chest wall: No tenderness.   Abdominal:      General: Abdomen is flat. There is no distension.      Palpations: There is no mass.      Tenderness: There is no abdominal tenderness. There is no right CVA tenderness, left CVA tenderness, guarding or rebound.      Hernia: No hernia is present.       Musculoskeletal:         General: Tenderness present. No swelling, deformity or signs of injury.      Cervical back: Normal range of motion and neck supple.      Right lower leg: No edema.      Left lower leg: No edema.   Skin:     General: Skin is warm and dry.      Capillary Refill: Capillary refill takes less than 2 seconds.   Neurological:      General: No focal deficit present.      Mental Status: He is alert and oriented to person, place, and time.      Cranial Nerves: No cranial nerve deficit.      Sensory: No sensory deficit.      Motor: No weakness.      Coordination: Coordination normal.      Gait: Gait normal.      Deep Tendon Reflexes: Reflexes normal.      Reflex Scores:       Patellar reflexes are 2+ on the right side and 2+ on the left side.       Achilles reflexes are 2+ on the right side and 2+ on the left side.     Comments: Muscle strength 5/5 in bilateral hip flexion, extension, knee flexion, extension as well as dorsiflexion plantarflexion    No saddle anesthesia or focal sensory deficits appreciated in a dermatomal distribution in lower extremities   Psychiatric:         Mood and Affect:  Mood normal.         Behavior: Behavior normal.

## 2024-12-05 ENCOUNTER — TELEPHONE (OUTPATIENT)
Age: 69
End: 2024-12-05

## 2024-12-05 NOTE — TELEPHONE ENCOUNTER
Patient called to check status of qulipta patient assistance form that he said he mailed to the office around 10/21/2024;    Per chart review, I did not see that his form was received;  I recommend that he complete his portion and attach form to his my chart;  he said he will do same    This is for Year 2025    :  please assist when received, thank you.

## 2024-12-06 NOTE — TELEPHONE ENCOUNTER
MSW reviewed chart - MSW unable to tell if prescription coverage if from commercial insurance plan or Medicare Part D.     MSW attempted to reach patient at [phone] to confirm insurance/discuss options to assist with Qulipta affordability. No answer. MSW left a message requesting callback. Awaiting same.

## 2024-12-10 NOTE — TELEPHONE ENCOUNTER
LATE ENTRY FROM 12/6/24:    Pt called back. Stated that he only has Medicare Part D.     MSW advised that our office never received his application that he mailed in back in October. Patient had copy of same, so he emailed application and copy of rx card to this writer.    MSW will initiate the provider section of the Qulipta PAP patricia and will send same to PA-C for completion.

## 2024-12-11 NOTE — TELEPHONE ENCOUNTER
Patient unreachable,  Letter was send from Dr. Gaspar's office on 7/22/21   Provider section of Qulipta PAP application sent to [employer], to print and give to LILIANA Jones.     Awaiting completion of same.

## 2024-12-13 NOTE — TELEPHONE ENCOUNTER
MSW received the completed provider portion of the Qulipta PAP application.    MSW faxed the completed application, insurance card copy, and med/allergy list to [employer] this date at [phone]. Successful fax notice received.

## 2025-02-04 ENCOUNTER — TELEMEDICINE (OUTPATIENT)
Dept: NEUROLOGY | Facility: CLINIC | Age: 70
End: 2025-02-04
Payer: MEDICARE

## 2025-02-04 DIAGNOSIS — G43.009 MIGRAINE WITHOUT AURA AND WITHOUT STATUS MIGRAINOSUS, NOT INTRACTABLE: Primary | ICD-10-CM

## 2025-02-04 DIAGNOSIS — G43.709 CHRONIC MIGRAINE WITHOUT AURA WITHOUT STATUS MIGRAINOSUS, NOT INTRACTABLE: ICD-10-CM

## 2025-02-04 PROCEDURE — 99214 OFFICE O/P EST MOD 30 MIN: CPT | Performed by: PHYSICIAN ASSISTANT
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
## 2025-02-04 NOTE — PATIENT INSTRUCTIONS
Preventative:  Continue medications from other providers  Continue Aimovig 140 mg every month     Abortive:  At onset of migraine take Zomig.  May repeat in 2 hours if needed.  Limit of 3 a week or 9 a month  Limit over-the-counter's to less than 3 doses a week    Please reach out immediately if you start to notice an increase in your migraines.  Once your taxes are filed try to reach out to our  again to see if you qualify for Qulipta
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
## 2025-02-04 NOTE — ASSESSMENT & PLAN NOTE
Preventative:  Continue medications from other providers  Continue Aimovig 140 mg every month    Abortive:  At onset of migraine take Zomig.  May repeat in 2 hours if needed.  Limit of 3 a week or 9 a month  Limit over-the-counter's to less than 3 doses a week